# Patient Record
Sex: FEMALE | Race: WHITE | NOT HISPANIC OR LATINO | Employment: FULL TIME | URBAN - METROPOLITAN AREA
[De-identification: names, ages, dates, MRNs, and addresses within clinical notes are randomized per-mention and may not be internally consistent; named-entity substitution may affect disease eponyms.]

---

## 2019-06-29 ENCOUNTER — OFFICE VISIT (OUTPATIENT)
Dept: URGENT CARE | Facility: CLINIC | Age: 39
End: 2019-06-29
Payer: COMMERCIAL

## 2019-06-29 VITALS
WEIGHT: 129 LBS | SYSTOLIC BLOOD PRESSURE: 106 MMHG | TEMPERATURE: 98 F | OXYGEN SATURATION: 99 % | BODY MASS INDEX: 20.25 KG/M2 | RESPIRATION RATE: 17 BRPM | DIASTOLIC BLOOD PRESSURE: 66 MMHG | HEIGHT: 67 IN | HEART RATE: 100 BPM

## 2019-06-29 DIAGNOSIS — H10.31 ACUTE BACTERIAL CONJUNCTIVITIS OF RIGHT EYE: Primary | ICD-10-CM

## 2019-06-29 DIAGNOSIS — L03.211 CELLULITIS OF FACE: ICD-10-CM

## 2019-06-29 PROCEDURE — 99214 OFFICE O/P EST MOD 30 MIN: CPT | Mod: S$GLB,,, | Performed by: PHYSICIAN ASSISTANT

## 2019-06-29 PROCEDURE — 3008F BODY MASS INDEX DOCD: CPT | Mod: CPTII,S$GLB,, | Performed by: PHYSICIAN ASSISTANT

## 2019-06-29 PROCEDURE — 99214 PR OFFICE/OUTPT VISIT, EST, LEVL IV, 30-39 MIN: ICD-10-PCS | Mod: S$GLB,,, | Performed by: PHYSICIAN ASSISTANT

## 2019-06-29 PROCEDURE — 3008F PR BODY MASS INDEX (BMI) DOCUMENTED: ICD-10-PCS | Mod: CPTII,S$GLB,, | Performed by: PHYSICIAN ASSISTANT

## 2019-06-29 RX ORDER — OFLOXACIN 3 MG/ML
1 SOLUTION/ DROPS OPHTHALMIC 4 TIMES DAILY
Qty: 5 ML | Refills: 0 | Status: SHIPPED | OUTPATIENT
Start: 2019-06-29 | End: 2020-08-27 | Stop reason: CLARIF

## 2019-06-29 RX ORDER — CEPHALEXIN 500 MG/1
500 CAPSULE ORAL EVERY 8 HOURS
Qty: 21 CAPSULE | Refills: 0 | Status: SHIPPED | OUTPATIENT
Start: 2019-06-29 | End: 2019-07-06

## 2019-06-29 NOTE — PROGRESS NOTES
"Subjective:       Patient ID: Alton Bach is a 38 y.o. female.    Vitals:  height is 5' 7" (1.702 m) and weight is 58.5 kg (129 lb). Her temperature is 97.6 °F (36.4 °C). Her blood pressure is 106/66 and her pulse is 100. Her respiration is 17 and oxygen saturation is 99%.     Chief Complaint: Eye Problem    Patient complaining of eye irritation and swelling that began yesterday. States that she has been rubbing her eyes constantly and when she wakes up in the morning especially, it is hard to open and close her eye secondary to pain.    Eye Problem    The right eye is affected. This is a new problem. The current episode started yesterday. The problem occurs constantly. The problem has been gradually worsening. The injury mechanism is unknown. The patient is experiencing no pain. There is no known exposure to pink eye. She does not wear contacts. Associated symptoms include an eye discharge and itching. Pertinent negatives include no blurred vision, double vision, eye redness, fever, nausea, photophobia or vomiting. She has tried nothing for the symptoms. The treatment provided no relief.       Constitution: Negative for chills and fever.   HENT: Negative for congestion and sinus pain.    Eyes: Positive for eye discharge, eye itching, eye pain and eyelid swelling. Negative for eye trauma, foreign body in eye, eye redness, photophobia, vision loss, double vision and blurred vision.   Gastrointestinal: Negative for nausea and vomiting.   Genitourinary: Negative for history of kidney stones.   Skin: Negative for rash.   Allergic/Immunologic: Positive for itching. Negative for seasonal allergies.   Neurological: Negative for headaches.       Objective:      Physical Exam   Constitutional: She is oriented to person, place, and time. She appears well-developed and well-nourished.   HENT:   Head: Normocephalic and atraumatic.       Right Ear: External ear normal.   Left Ear: External ear normal.   Nose: Nose normal. "   Mouth/Throat: Oropharynx is clear and moist.   Eyes: Pupils are equal, round, and reactive to light. Conjunctivae, EOM and lids are normal. Right eye exhibits discharge.       Neck: Trachea normal, full passive range of motion without pain and phonation normal. Neck supple.   Musculoskeletal: Normal range of motion.   Neurological: She is alert and oriented to person, place, and time.   Skin: Skin is warm, dry and intact.   Psychiatric: She has a normal mood and affect. Her speech is normal and behavior is normal. Judgment and thought content normal. Cognition and memory are normal.   Nursing note and vitals reviewed.      Assessment:       1. Acute bacterial conjunctivitis of right eye    2. Cellulitis of face        Plan:         Acute bacterial conjunctivitis of right eye  -     ofloxacin (OCUFLOX) 0.3 % ophthalmic solution; Place 1 drop into the right eye 4 (four) times daily.  Dispense: 5 mL; Refill: 0    Cellulitis of face  -     cephALEXin (KEFLEX) 500 MG capsule; Take 1 capsule (500 mg total) by mouth every 8 (eight) hours. for 7 days  Dispense: 21 capsule; Refill: 0      Patient Instructions   General Discharge Instructions   If you were prescribed a narcotic or controlled medication, do not drive or operate heavy equipment or machinery while taking these medications.  If you were prescribed antibiotics, please take them to completion.  You must understand that you've received an Urgent Care treatment only and that you may be released before all your medical problems are known or treated. You, the patient, will arrange for follow up care as instructed.  Follow up with your PCP or specialty clinic as directed in the next 1-2 weeks if not improved or as needed.  You can call [phone] to schedule an appointment with the appropriate provider.  If your condition worsens we recommend that you receive another evaluation at the emergency room immediately or contact your primary medical clinics after hours  call service to discuss your concerns.  Please return here or go to the Emergency Department for any concerns or worsening of condition.      Conjunctivitis, Bacterial    You have an infection in the membranes covering the white part of the eye. This part of the eye is called the conjunctiva. The infection is called conjunctivitis. The most common symptoms of conjunctivitis include a thick, pus-like discharge from the eye, swollen eyelids, redness, eyelids sticking together upon awakening, and a gritty or scratchy feeling in the eye. Your infection was caused by bacteria. It may be treated with medicine. With treatment, the infection takes about 7 to 10 days to resolve.  Home care  · Use prescribed antibiotic eye drops or ointment as directed to treat the infection.  · Apply a warm compress (towel soaked in warm water) to the affected eye 3 to 4 times a day. Do this just before applying medicine to the eye.  · Use a warm, wet cloth to wipe away crusting of the eyelids in the morning. This is caused by mucus drainage during the night. You may also use saline irrigating solution or artificial tears to rinse away mucus in the eye. Do not put a patch over the eye.  · Wash your hands before and after touching the infected eye. This is to prevent spreading the infection to the other eye, and to other people. Do not share your towels or washcloths with others.  · You may use acetaminophen or ibuprofen to control pain, unless another medicine was prescribed. (Note: If you have chronic liver or kidney disease or have ever had a stomach ulcer or gastrointestinal bleeding, talk with your doctor before using these medicines.)  · Do not wear contact lenses until your eyes have healed and all symptoms are gone.  Follow-up care  Follow up with your healthcare provider, or as advised.  When to seek medical advice  Call your healthcare provider right away if any of these occur:  · Worsening vision  · Increasing pain in the  eye  · Increasing swelling or redness of the eyelid  · Redness spreading around the eye  Date Last Reviewed: 6/14/2015  © 4178-1942 [employer]. [address] [place] [address], [place] [address]. All rights reserved. This information is not intended as a substitute for professional medical care. Always follow your healthcare professional's instructions.        Facial Cellulitis  Cellulitis is an infection of the deep layers of skin. A break in the skin, such as a cut or scratch, can let bacteria under the skin. It may also occur from an infected oil gland (pimple) or hair follicle. If the bacteria get to deep layers of the skin, it can be serious. If not treated, cellulitis can get into the bloodstream and lymph nodes. The infection can then spread throughout the body. This causes serious illness.  Cellulitis causes the affected skin to become red, swollen, warm, and sore. The reddened areas have a visible border. You may have a fever, chills, and pain.  Cellulitis is treated with antibiotics taken for 7 to 10 days. Symptoms should get better 1 to 2 days after treatment is started. Make sure to take all the antibiotics for the full number of days until they are gone. Keep taking the medication even if your symptoms go away.  Home care  Follow these tips:  · Take all of the antibiotic medicine exactly as directed until it is gone. Dont miss any doses, especially during the first 7 days. Dont stop taking it when your symptoms get better.  · Use a cool compress (face cloth soaked in cool water) on your face to help reduce swelling and pain.  · You may use acetaminophen or ibuprofen to reduce pain. Dont use these if you have chronic liver or kidney disease, or ever had a stomach ulcer or gastrointestinal bleeding. Talk with your healthcare provider first.  Follow-up care  Follow up with your healthcare provider, or as advised. If your infection does not go away on the first antibiotic, your healthcare provider  will prescribe a different one.  When to seek medical advice  Call your healthcare provider right away if any of these occur:  · Fever higher of 100.4º F (38.0º C) or higher after 2 days on antibiotics  · Red areas that spread  · Swelling or pain that gets worse  · Fluid leaking from the skin (pus)  · An eyelid that swells shut or leaks fluid (pus)  · Headache or neck pain that gets worse  · Unusual drowsiness or confusion  · Convulsions (seizure)  · Change in eyesight     Date Last Reviewed: 9/1/2016  © 5304-2018 [employer]. [address] [place] [address]. All rights reserved. This information is not intended as a substitute for professional medical care. Always follow your healthcare professional's instructions.

## 2019-06-29 NOTE — PATIENT INSTRUCTIONS
General Discharge Instructions   If you were prescribed a narcotic or controlled medication, do not drive or operate heavy equipment or machinery while taking these medications.  If you were prescribed antibiotics, please take them to completion.  You must understand that you've received an Urgent Care treatment only and that you may be released before all your medical problems are known or treated. You, the patient, will arrange for follow up care as instructed.  Follow up with your PCP or specialty clinic as directed in the next 1-2 weeks if not improved or as needed.  You can call [phone] to schedule an appointment with the appropriate provider.  If your condition worsens we recommend that you receive another evaluation at the emergency room immediately or contact your primary medical clinics after hours call service to discuss your concerns.  Please return here or go to the Emergency Department for any concerns or worsening of condition.      Conjunctivitis, Bacterial    You have an infection in the membranes covering the white part of the eye. This part of the eye is called the conjunctiva. The infection is called conjunctivitis. The most common symptoms of conjunctivitis include a thick, pus-like discharge from the eye, swollen eyelids, redness, eyelids sticking together upon awakening, and a gritty or scratchy feeling in the eye. Your infection was caused by bacteria. It may be treated with medicine. With treatment, the infection takes about 7 to 10 days to resolve.  Home care  · Use prescribed antibiotic eye drops or ointment as directed to treat the infection.  · Apply a warm compress (towel soaked in warm water) to the affected eye 3 to 4 times a day. Do this just before applying medicine to the eye.  · Use a warm, wet cloth to wipe away crusting of the eyelids in the morning. This is caused by mucus drainage during the night. You may also use saline irrigating solution or artificial tears to rinse  away mucus in the eye. Do not put a patch over the eye.  · Wash your hands before and after touching the infected eye. This is to prevent spreading the infection to the other eye, and to other people. Do not share your towels or washcloths with others.  · You may use acetaminophen or ibuprofen to control pain, unless another medicine was prescribed. (Note: If you have chronic liver or kidney disease or have ever had a stomach ulcer or gastrointestinal bleeding, talk with your doctor before using these medicines.)  · Do not wear contact lenses until your eyes have healed and all symptoms are gone.  Follow-up care  Follow up with your healthcare provider, or as advised.  When to seek medical advice  Call your healthcare provider right away if any of these occur:  · Worsening vision  · Increasing pain in the eye  · Increasing swelling or redness of the eyelid  · Redness spreading around the eye  Date Last Reviewed: 6/14/2015  © 5790-7555 [employer]. [address] [place] [address]. All rights reserved. This information is not intended as a substitute for professional medical care. Always follow your healthcare professional's instructions.        Facial Cellulitis  Cellulitis is an infection of the deep layers of skin. A break in the skin, such as a cut or scratch, can let bacteria under the skin. It may also occur from an infected oil gland (pimple) or hair follicle. If the bacteria get to deep layers of the skin, it can be serious. If not treated, cellulitis can get into the bloodstream and lymph nodes. The infection can then spread throughout the body. This causes serious illness.  Cellulitis causes the affected skin to become red, swollen, warm, and sore. The reddened areas have a visible border. You may have a fever, chills, and pain.  Cellulitis is treated with antibiotics taken for 7 to 10 days. Symptoms should get better 1 to 2 days after treatment is started. Make sure to take all the  antibiotics for the full number of days until they are gone. Keep taking the medication even if your symptoms go away.  Home care  Follow these tips:  · Take all of the antibiotic medicine exactly as directed until it is gone. Dont miss any doses, especially during the first 7 days. Dont stop taking it when your symptoms get better.  · Use a cool compress (face cloth soaked in cool water) on your face to help reduce swelling and pain.  · You may use acetaminophen or ibuprofen to reduce pain. Dont use these if you have chronic liver or kidney disease, or ever had a stomach ulcer or gastrointestinal bleeding. Talk with your healthcare provider first.  Follow-up care  Follow up with your healthcare provider, or as advised. If your infection does not go away on the first antibiotic, your healthcare provider will prescribe a different one.  When to seek medical advice  Call your healthcare provider right away if any of these occur:  · Fever higher of 100.4º F (38.0º C) or higher after 2 days on antibiotics  · Red areas that spread  · Swelling or pain that gets worse  · Fluid leaking from the skin (pus)  · An eyelid that swells shut or leaks fluid (pus)  · Headache or neck pain that gets worse  · Unusual drowsiness or confusion  · Convulsions (seizure)  · Change in eyesight     Date Last Reviewed: 9/1/2016  © 7422-7590 [employer]. [address] [place] [address], [place] [address]. All rights reserved. This information is not intended as a substitute for professional medical care. Always follow your healthcare professional's instructions.

## 2020-05-14 ENCOUNTER — HOSPITAL ENCOUNTER (EMERGENCY)
Facility: HOSPITAL | Age: 40
Discharge: HOME | End: 2020-05-14
Attending: INTERNAL MEDICINE
Payer: COMMERCIAL

## 2020-05-14 VITALS
HEIGHT: 67 IN | TEMPERATURE: 98 F | DIASTOLIC BLOOD PRESSURE: 69 MMHG | WEIGHT: 135 LBS | SYSTOLIC BLOOD PRESSURE: 142 MMHG | HEART RATE: 74 BPM | RESPIRATION RATE: 18 BRPM | OXYGEN SATURATION: 98 % | BODY MASS INDEX: 21.19 KG/M2

## 2020-05-14 DIAGNOSIS — M79.604 LOWER EXTREMITY PAIN, RIGHT: ICD-10-CM

## 2020-05-14 DIAGNOSIS — S76.211A GROIN STRAIN, RIGHT, INITIAL ENCOUNTER: Primary | ICD-10-CM

## 2020-05-14 PROBLEM — S76.219A GROIN STRAIN: Status: ACTIVE | Noted: 2020-05-14

## 2020-05-14 LAB
B-HCG UR QL: NEGATIVE
CTP QC/QA: YES

## 2020-05-14 PROCEDURE — 99284 EMERGENCY DEPT VISIT MOD MDM: CPT | Mod: 25,ER

## 2020-05-14 PROCEDURE — 81025 URINE PREGNANCY TEST: CPT | Mod: ER | Performed by: INTERNAL MEDICINE

## 2020-05-14 RX ORDER — IBUPROFEN 600 MG/1
600 TABLET ORAL 3 TIMES DAILY
Qty: 30 TABLET | Refills: 0 | Status: SHIPPED | OUTPATIENT
Start: 2020-05-14 | End: 2023-08-04

## 2020-05-15 NOTE — ED PROVIDER NOTES
Encounter Date: 5/14/2020    SCRIBE #1 NOTE: I, Vannessa Lozano, am scribing for, and in the presence of,  Dr. Ambrose. I have scribed the following portions of the note - Other sections scribed: HPI, ROS, PE.       History     Chief Complaint   Patient presents with    Leg Pain     PT C/O UNCOMFORTABLE FEELING TO RIGHT CALF AND KNEE FOR 2 -3 WEEKS WITH FEELING NOW RADIATING TO RIGHT GROIN     Alton Bach is a 39 y.o. female who presents to the ED complaining of pain in her right calf that radiates to the right side of her groin for the last 2 weeks. Pt denies fever, sore throat, shortness of breath, dysuria, behavioral problems, weakness, rash, chest pain, headaches, diarrhea, back pain, nausea and vomiting.     The history is provided by the patient. No  was used.     Review of patient's allergies indicates:   Allergen Reactions    Coconut     [place]      History reviewed. No pertinent past medical history.  Past Surgical History:   Procedure Laterality Date    breast augmentation      CHOLECYSTECTOMY       Family History   Problem Relation Age of Onset    Hypertension Mother     Heart disease Father      Social History     Tobacco Use    Smoking status: Current Every Day Smoker     Packs/day: 0.50    Smokeless tobacco: Never Used   Substance Use Topics    Alcohol use: No    Drug use: No     Review of Systems   Constitutional: Negative for fever.   HENT: Negative for sore throat.    Respiratory: Negative for shortness of breath.    Cardiovascular: Negative for chest pain.   Gastrointestinal: Negative for nausea and vomiting.   Genitourinary: Negative for dysuria.   Musculoskeletal: Positive for arthralgias and myalgias. Negative for back pain.   Skin: Negative for rash.   Neurological: Negative for weakness.   Hematological: Negative for adenopathy.   Psychiatric/Behavioral: Negative for behavioral problems.   All other systems reviewed and are negative.      Physical  Exam     Initial Vitals [05/14/20 2102]   BP Pulse Resp Temp SpO2   (!) 157/103 98 18 98.5 °F (36.9 °C) 98 %      MAP       --         Physical Exam    Nursing note and vitals reviewed.  Constitutional: She appears well-developed and well-nourished.   HENT:   Head: Normocephalic and atraumatic.   Eyes: Conjunctivae are normal.   Neck: Normal range of motion. Neck supple.   Cardiovascular: Normal rate, regular rhythm and normal heart sounds. Exam reveals no gallop and no friction rub.    No murmur heard.  Pulmonary/Chest: Breath sounds normal. No respiratory distress. She has no wheezes. She has no rhonchi. She has no rales.   Abdominal: Soft. There is no tenderness.   Musculoskeletal: Normal range of motion. She exhibits no edema or tenderness.   Right groin/upper thigh pain upon internal and external rotation right foot without tenderness to palpation gross   Neurological: She is alert and oriented to person, place, and time. She has normal strength and normal reflexes. GCS score is 15. GCS eye subscore is 4. GCS verbal subscore is 5. GCS motor subscore is 6.   Skin: Skin is warm and dry. Capillary refill takes less than 2 seconds.   Psychiatric: She has a normal mood and affect. Thought content normal.         ED Course   Procedures  Labs Reviewed   POCT URINE PREGNANCY          Imaging Results          US Lower Extremity Veins Right (Final result)  Result time 05/14/20 22:27:09    Final result by Flor Dahl MD (05/14/20 22:27:09)                 Impression:      No evidence of right deep venous thrombosis.      Electronically signed by: Flor Dahl  Date:    05/14/2020  Time:    22:27             Narrative:    EXAMINATION:  ULTRASOUND LOWER EXTREMITY VEINS RIGHT    CLINICAL HISTORY:  Pain in right leg    TECHNIQUE:  Grayscale imaging of the right lower extremity to evaluate the deep and superficial venous system with color Doppler interrogation and Spectral Doppler wave form analysis was  performed.    COMPARISON:  None.    FINDINGS:  There is normal color Doppler interrogation, compression and distal augmentation of the right common femoral, femoral, greater saphenous, popliteal, posterior tibial, anterior tibial, and peroneal veins.                                 Medical Decision Making:   History:   Old Medical Records: I decided to obtain old medical records.  Initial Assessment:    39 y.o. female who presents to the ED complaining of pain in her right calf that radiates to the right side of her groin for the last 2 weeks.  Clinical Tests:   Lab Tests: Ordered and Reviewed  Radiological Study: Ordered and Reviewed  ED Management:  Ultrasound right lower extremity no evidence of DVT.  Patient received a prescription for ibuprofen.  Instructions for groin strain were given.  She was advised to follow up with her primary care physician within the next 3 days for re-evaluation/return to the emergency department if condition worsens.            Scribe Attestation:   Scribe #1: I performed the above scribed service and the documentation accurately describes the services I performed. I attest to the accuracy of the note.    This document was produced by a scribe under my direction and in my presence. I agree with the content of the note and have made any necessary edits.     Dr. Ambrose    05/15/2020 4:00 AM                        Clinical Impression:     1. Groin strain, right, initial encounter    2. Lower extremity pain, right            Disposition:   Disposition: Discharged  Condition: Stable                        Fazal Ambrose MD  05/15/20 0401       Fazal Ambrose MD  05/15/20 0402

## 2020-07-23 ENCOUNTER — OFFICE VISIT (OUTPATIENT)
Dept: SURGERY | Facility: CLINIC | Age: 40
End: 2020-07-23
Payer: COMMERCIAL

## 2020-07-23 VITALS
HEIGHT: 67 IN | BODY MASS INDEX: 21.18 KG/M2 | DIASTOLIC BLOOD PRESSURE: 80 MMHG | SYSTOLIC BLOOD PRESSURE: 121 MMHG | WEIGHT: 134.94 LBS | HEART RATE: 92 BPM

## 2020-07-23 DIAGNOSIS — Z12.31 SCREENING MAMMOGRAM, ENCOUNTER FOR: Primary | ICD-10-CM

## 2020-07-23 DIAGNOSIS — N63.0 BREAST MASS: ICD-10-CM

## 2020-07-23 PROCEDURE — 99203 PR OFFICE/OUTPT VISIT, NEW, LEVL III, 30-44 MIN: ICD-10-PCS | Mod: S$GLB,,, | Performed by: SURGERY

## 2020-07-23 PROCEDURE — 99999 PR PBB SHADOW E&M-EST. PATIENT-LVL III: ICD-10-PCS | Mod: PBBFAC,,, | Performed by: SURGERY

## 2020-07-23 PROCEDURE — 3008F PR BODY MASS INDEX (BMI) DOCUMENTED: ICD-10-PCS | Mod: CPTII,S$GLB,, | Performed by: SURGERY

## 2020-07-23 PROCEDURE — 99999 PR PBB SHADOW E&M-EST. PATIENT-LVL III: CPT | Mod: PBBFAC,,, | Performed by: SURGERY

## 2020-07-23 PROCEDURE — 99203 OFFICE O/P NEW LOW 30 MIN: CPT | Mod: S$GLB,,, | Performed by: SURGERY

## 2020-07-23 PROCEDURE — 3008F BODY MASS INDEX DOCD: CPT | Mod: CPTII,S$GLB,, | Performed by: SURGERY

## 2020-07-23 RX ORDER — MULTIVITAMIN
1 TABLET ORAL DAILY
COMMUNITY
End: 2022-06-27 | Stop reason: CLARIF

## 2020-07-23 NOTE — PROGRESS NOTES
History and Physical  [facility]  Department of Surgery    CHIEF COMPLAINT: bilateral breast mass    REFERRING:  Aaareferral Self  No address on file  Primary Doctor No      Subjective:      Alton Bach is a 39 y.o. premenopausal female referred for evaluation of a breast mass. Change was noted a while ago.  Patient does routinely do self breast exams.  Patient has noted a change on breast exam.  Patient denies nipple discharge. Patient reports to previous breast biopsy. Patient denies a personal history of breast cancer.    Reports masses in both breasts, however she thinks she felt a new one in the upper right breast. History of biopsy that was negative in the past.  Last mammogram was 5 years ago.  Does have hisotry of implants.    GYN History:  Age of menarche was 12. Age of menopause was n/a. Patient denies hormonal therapy. Patient is . Age of first live birth was 18.  Patient did not breast feed.    FAMILY history:  none    History reviewed. No pertinent past medical history.  Past Surgical History:   Procedure Laterality Date    breast augmentation      BREAST BIOPSY Right     CHOLECYSTECTOMY       Current Outpatient Medications on File Prior to Visit   Medication Sig Dispense Refill    fluticasone (FLONASE) 50 mcg/actuation nasal spray 1-2 sprays by Each Nare route once daily. 1 Bottle 0    ibuprofen (ADVIL,MOTRIN) 600 MG tablet Take 1 tablet (600 mg total) by mouth 3 (three) times daily. 30 tablet 0    loratadine (CLARITIN) 10 mg tablet Take 1 tablet (10 mg total) by mouth once daily. 30 tablet 0    multivitamin (ONE DAILY MULTIVITAMIN) per tablet Take 1 tablet by mouth once daily.      ofloxacin (OCUFLOX) 0.3 % ophthalmic solution Place 1 drop into the right eye 4 (four) times daily. 5 mL 0     No current facility-administered medications on file prior to visit.      Social History     Socioeconomic History    Marital status: Single     Spouse name: Not on file    Number  "of children: Not on file    Years of education: Not on file    Highest education level: Not on file   Occupational History    Not on file   Social Needs    Financial resource strain: Not on file    Food insecurity     Worry: Not on file     Inability: Not on file    Transportation needs     Medical: Not on file     Non-medical: Not on file   Tobacco Use    Smoking status: Current Every Day Smoker     Packs/day: 0.50    Smokeless tobacco: Never Used   Substance and Sexual Activity    Alcohol use: No    Drug use: No    Sexual activity: Not on file   Lifestyle    Physical activity     Days per week: Not on file     Minutes per session: Not on file    Stress: Not on file   Relationships    Social connections     Talks on phone: Not on file     Gets together: Not on file     Attends Sikh service: Not on file     Active member of club or organization: Not on file     Attends meetings of clubs or organizations: Not on file     Relationship status: Not on file   Other Topics Concern    Not on file   Social History Narrative    Not on file     Family History   Problem Relation Age of Onset    Hypertension Mother     Heart disease Father        Review of Systems  Pertinent items are noted in HPI.       Objective:        /80 (BP Location: Left arm, Patient Position: Sitting, BP Method: Medium (Automatic))   Pulse 92   Ht 5' 7" (1.702 m)   Wt 61.2 kg (134 lb 14.7 oz)   LMP 07/13/2020 (Exact Date)   BMI 21.13 kg/m²   General appearance: alert, appears stated age and cooperative  Head: Normocephalic, without obvious abnormality, atraumatic  Neck: no adenopathy and supple, symmetrical, trachea midline  Lungs: normal effort, nonlabored breathing  Breasts: No nipple retraction or dimpling, No nipple discharge or bleeding, No axillary or supraclavicular adenopathy, positive findings: thickenin the right UOQ.   Extremities: extremities normal, atraumatic, no cyanosis or edema  Skin: normal, no edema " and no lesions noted  Lymph nodes: Cervical, supraclavicular, and axillary nodes normal.  Neurologic: Grossly normal    Radiology review: due      Assessment:      Alton Bach is a 39 y.o. premenopausal female with bilateral breast masses, overdue for imaging.     Plan:   We discussed the options for management of this.    We discussed further evaluation with imaging including an ultrasound and mammogram.  Pending results will determine next step.  May need image guided biopsy.

## 2020-07-27 ENCOUNTER — HOSPITAL ENCOUNTER (OUTPATIENT)
Dept: RADIOLOGY | Facility: HOSPITAL | Age: 40
Discharge: HOME | End: 2020-07-27
Attending: SURGERY
Payer: COMMERCIAL

## 2020-07-27 DIAGNOSIS — Z12.31 SCREENING MAMMOGRAM, ENCOUNTER FOR: ICD-10-CM

## 2020-07-27 PROCEDURE — 77063 MAMMO DIGITAL SCREENING BILAT WITH TOMOSYNTHESIS_CAD: ICD-10-PCS | Mod: 26,,, | Performed by: RADIOLOGY

## 2020-07-27 PROCEDURE — 77067 SCR MAMMO BI INCL CAD: CPT | Mod: TC

## 2020-07-27 PROCEDURE — 77067 MAMMO DIGITAL SCREENING BILAT WITH TOMOSYNTHESIS_CAD: ICD-10-PCS | Mod: 26,,, | Performed by: RADIOLOGY

## 2020-07-27 PROCEDURE — 77067 SCR MAMMO BI INCL CAD: CPT | Mod: 26,,, | Performed by: RADIOLOGY

## 2020-07-27 PROCEDURE — 77063 BREAST TOMOSYNTHESIS BI: CPT | Mod: 26,,, | Performed by: RADIOLOGY

## 2020-07-28 ENCOUNTER — TELEPHONE (OUTPATIENT)
Dept: RADIOLOGY | Facility: HOSPITAL | Age: 40
End: 2020-07-28

## 2020-07-28 NOTE — TELEPHONE ENCOUNTER
Spoke with patient and explained mammogram findings.Patient expressed understanding of results. Patient scheduled abnormal mammogram follow up appointment at [facility] [place] on 8/6/2020.

## 2020-08-06 ENCOUNTER — TELEPHONE (OUTPATIENT)
Dept: RADIOLOGY | Facility: HOSPITAL | Age: 40
End: 2020-08-06

## 2020-08-06 ENCOUNTER — HOSPITAL ENCOUNTER (OUTPATIENT)
Dept: RADIOLOGY | Facility: HOSPITAL | Age: 40
Discharge: HOME | End: 2020-08-06
Attending: SURGERY
Payer: COMMERCIAL

## 2020-08-06 DIAGNOSIS — R92.8 ABNORMAL MAMMOGRAM: ICD-10-CM

## 2020-08-06 PROCEDURE — 76642 ULTRASOUND BREAST LIMITED: CPT | Mod: 26,LT,, | Performed by: RADIOLOGY

## 2020-08-06 PROCEDURE — 77066 MAMMO DIGITAL DIAGNOSTIC BILAT WITH TOMOSYNTHESIS_CAD: ICD-10-PCS | Mod: 26,,, | Performed by: RADIOLOGY

## 2020-08-06 PROCEDURE — 77062 BREAST TOMOSYNTHESIS BI: CPT | Mod: 26,,, | Performed by: RADIOLOGY

## 2020-08-06 PROCEDURE — 76642 ULTRASOUND BREAST LIMITED: CPT | Mod: TC,50,PO

## 2020-08-06 PROCEDURE — 77066 DX MAMMO INCL CAD BI: CPT | Mod: TC,PO

## 2020-08-06 PROCEDURE — 76642 ULTRASOUND BREAST LIMITED: CPT | Mod: 26,RT,, | Performed by: RADIOLOGY

## 2020-08-06 PROCEDURE — 76642 US BREAST BILATERAL LIMITED: ICD-10-PCS | Mod: 26,RT,, | Performed by: RADIOLOGY

## 2020-08-06 PROCEDURE — 77066 DX MAMMO INCL CAD BI: CPT | Mod: 26,,, | Performed by: RADIOLOGY

## 2020-08-06 PROCEDURE — 77062 MAMMO DIGITAL DIAGNOSTIC BILAT WITH TOMOSYNTHESIS_CAD: ICD-10-PCS | Mod: 26,,, | Performed by: RADIOLOGY

## 2020-08-06 NOTE — TELEPHONE ENCOUNTER
Spoke with patient. Reviewed breast biopsy procedure and reviewed instructions for breast biopsy. Patient expressed understanding and all questions were answered. Provided patient with my phone number to call for any further concerns or questions.   Patient scheduled breast biopsy at the [facility] on 8/21/2020.

## 2020-08-20 ENCOUNTER — RESEARCH ENCOUNTER (OUTPATIENT)
Dept: RESEARCH | Facility: HOSPITAL | Age: 40
End: 2020-08-20

## 2020-08-21 ENCOUNTER — HOSPITAL ENCOUNTER (OUTPATIENT)
Dept: RADIOLOGY | Facility: HOSPITAL | Age: 40
Discharge: HOME | End: 2020-08-21
Attending: SURGERY
Payer: COMMERCIAL

## 2020-08-21 DIAGNOSIS — N63.0 BREAST MASS: Primary | ICD-10-CM

## 2020-08-21 DIAGNOSIS — R92.8 ABNORMAL MAMMOGRAM: ICD-10-CM

## 2020-08-21 PROCEDURE — 77066 MAMMO DIGITAL DIAGNOSTIC BILAT WITH CAD: ICD-10-PCS | Mod: 26,,, | Performed by: RADIOLOGY

## 2020-08-21 PROCEDURE — 25000003 PHARM REV CODE 250: Performed by: SURGERY

## 2020-08-21 PROCEDURE — 27201068 MAMMO DIGITAL DIAGNOSTIC BILAT WITH CAD

## 2020-08-21 PROCEDURE — 27201068 US BREAST BIOPSY WITH IMAGING 1ST SITE RIGHT

## 2020-08-21 PROCEDURE — 88341 IMHCHEM/IMCYTCHM EA ADD ANTB: CPT | Mod: 59 | Performed by: PATHOLOGY

## 2020-08-21 PROCEDURE — 88360 TUMOR IMMUNOHISTOCHEM/MANUAL: CPT | Performed by: PATHOLOGY

## 2020-08-21 PROCEDURE — 88342 IMHCHEM/IMCYTCHM 1ST ANTB: CPT | Mod: 26,,, | Performed by: PATHOLOGY

## 2020-08-21 PROCEDURE — 19083 BX BREAST 1ST LESION US IMAG: CPT | Mod: TC,LT

## 2020-08-21 PROCEDURE — 19083 BX BREAST 1ST LESION US IMAG: CPT | Mod: LT,,, | Performed by: RADIOLOGY

## 2020-08-21 PROCEDURE — 88305 TISSUE EXAM BY PATHOLOGIST: CPT | Mod: 26,,, | Performed by: PATHOLOGY

## 2020-08-21 PROCEDURE — 77066 DX MAMMO INCL CAD BI: CPT | Mod: 26,,, | Performed by: RADIOLOGY

## 2020-08-21 PROCEDURE — 77066 DX MAMMO INCL CAD BI: CPT | Mod: TC

## 2020-08-21 PROCEDURE — 19083 US BREAST BIOPSY WITH IMAGING 1ST SITE LEFT: ICD-10-PCS | Mod: LT,,, | Performed by: RADIOLOGY

## 2020-08-21 PROCEDURE — 88305 TISSUE EXAM BY PATHOLOGIST: CPT | Performed by: PATHOLOGY

## 2020-08-21 PROCEDURE — 88341 IMHCHEM/IMCYTCHM EA ADD ANTB: CPT | Mod: 26,,, | Performed by: PATHOLOGY

## 2020-08-21 PROCEDURE — 88341 PR IHC OR ICC EACH ADD'L SINGLE ANTIBODY  STAINPR: ICD-10-PCS | Mod: 26,,, | Performed by: PATHOLOGY

## 2020-08-21 PROCEDURE — 19084 BX BREAST ADD LESION US IMAG: CPT | Mod: RT,,, | Performed by: RADIOLOGY

## 2020-08-21 PROCEDURE — 88342 IMHCHEM/IMCYTCHM 1ST ANTB: CPT | Performed by: PATHOLOGY

## 2020-08-21 PROCEDURE — 19084 US BREAST BIOPSY WITH IMAGING EA ADDITIONAL: ICD-10-PCS | Mod: RT,,, | Performed by: RADIOLOGY

## 2020-08-21 PROCEDURE — 88342 CHG IMMUNOCYTOCHEMISTRY: ICD-10-PCS | Mod: 26,,, | Performed by: PATHOLOGY

## 2020-08-21 PROCEDURE — 19083 BX BREAST 1ST LESION US IMAG: CPT | Mod: RT,,, | Performed by: RADIOLOGY

## 2020-08-21 PROCEDURE — 19083 US BREAST BIOPSY WITH IMAGING 1ST SITE RIGHT: ICD-10-PCS | Mod: RT,,, | Performed by: RADIOLOGY

## 2020-08-21 PROCEDURE — 27201068 US BREAST BIOPSY WITH IMAGING EA ADDITIONAL

## 2020-08-21 PROCEDURE — 88305 TISSUE EXAM BY PATHOLOGIST: ICD-10-PCS | Mod: 26,,, | Performed by: PATHOLOGY

## 2020-08-21 RX ORDER — LIDOCAINE HYDROCHLORIDE 10 MG/ML
6 INJECTION INFILTRATION; PERINEURAL ONCE
Status: COMPLETED | OUTPATIENT
Start: 2020-08-21 | End: 2020-08-21

## 2020-08-21 RX ORDER — LIDOCAINE HYDROCHLORIDE AND EPINEPHRINE 20; 10 MG/ML; UG/ML
10 INJECTION, SOLUTION INFILTRATION; PERINEURAL ONCE
Status: COMPLETED | OUTPATIENT
Start: 2020-08-21 | End: 2020-08-21

## 2020-08-21 RX ORDER — LIDOCAINE HYDROCHLORIDE AND EPINEPHRINE 20; 10 MG/ML; UG/ML
20 INJECTION, SOLUTION INFILTRATION; PERINEURAL ONCE
Status: COMPLETED | OUTPATIENT
Start: 2020-08-21 | End: 2020-08-21

## 2020-08-21 RX ORDER — LIDOCAINE HYDROCHLORIDE 10 MG/ML
3 INJECTION INFILTRATION; PERINEURAL ONCE
Status: COMPLETED | OUTPATIENT
Start: 2020-08-21 | End: 2020-08-21

## 2020-08-21 RX ADMIN — LIDOCAINE HYDROCHLORIDE,EPINEPHRINE BITARTRATE 10 ML: 20; .01 INJECTION, SOLUTION INFILTRATION; PERINEURAL at 10:08

## 2020-08-21 RX ADMIN — LIDOCAINE HYDROCHLORIDE 6 ML: 10 INJECTION, SOLUTION INFILTRATION; PERINEURAL at 06:08

## 2020-08-21 RX ADMIN — LIDOCAINE HYDROCHLORIDE 3 ML: 10 INJECTION, SOLUTION EPIDURAL; INFILTRATION; INTRACAUDAL; PERINEURAL at 08:08

## 2020-08-21 RX ADMIN — LIDOCAINE HYDROCHLORIDE,EPINEPHRINE BITARTRATE 20 ML: 20; .01 INJECTION, SOLUTION INFILTRATION; PERINEURAL at 09:08

## 2020-08-21 NOTE — PROGRESS NOTES
Pt was contacted by phone regarding participation in IRB protocol #2018.314, [facility] Breast Biopsy study. Pt was agreeable.      The Informed Consent Form (ICF) was reviewed with pt. The discussion included:   - participation is voluntary  - pt can change her mind about participating  - pt was informed that participation in this study would not preclude her from participating in any other research if offered  - specimens may be used by [facility] researchers or community researchers  - specimens collected include only those discussed with the patient at the time of consent and are indicated on the ICF   - specimens may be used for DNA, RNA or protein studies investigating biomarkers for diagnostic, prognostic or treatment purposes  - breast biopsy will be collected from [facility] after their approval  - all specimens released to researchers will be stripped of identifiers  - no personal medical information will be released to any parties outside of this research study  - there will be no other physical risks outside of those involved in standard of care procedure      Permission was obtained by telephone and was provided adequate time to ask questions regarding the scope and purpose of the study.  The above statements were read by the person obtaining permission to the person granting permission and witnessed by Zhanna Mathew, who also confirmed the patient consent to the study. The witness information was documented on the verbal consent form as well.  This Verbal Informed Consent process was conducted prior to initiation of any study procedures

## 2020-08-25 ENCOUNTER — TELEPHONE (OUTPATIENT)
Dept: SURGERY | Facility: CLINIC | Age: 40
End: 2020-08-25

## 2020-08-25 NOTE — NURSING
Oncology Navigation   Intake  Date of Diagnosis: 08/21/20  Cancer Type: Breast  MD Assigned: Dr. Anahi Huitron  Internal / External Referral: Internal  Initial Nurse Navigator Contact: 08/25/20  Diagnosis to Initial Contact Timeline (days): 4 days  Contact Method: Phone  Date Worked: 08/25/20  First Appointment Available: 08/27/20  Appointment Date: 08/27/20  Schedule to Appointment Timeline (days): 2  First Available Date vs. Scheduled Date (days): 0     Treatment                  Acuity      Follow Up  Follow up in about 3 days (around 8/28/2020) for after initial visit.

## 2020-08-25 NOTE — TELEPHONE ENCOUNTER
Called Patient with results of breast biopsy from 8/21/2020. Explained that the biopsy showed DCIS. Discussed what this means and that the next step is to meet with her breast surgeon. An appt was made for 8/27/2020 with Dr. Huitron. Reviewed location of [facility]. Patient verbalized understanding.

## 2020-08-27 ENCOUNTER — LAB VISIT (OUTPATIENT)
Dept: LAB | Facility: OTHER | Age: 40
End: 2020-08-27
Attending: SURGERY
Payer: COMMERCIAL

## 2020-08-27 ENCOUNTER — OFFICE VISIT (OUTPATIENT)
Dept: SURGERY | Facility: CLINIC | Age: 40
End: 2020-08-27
Attending: SURGERY
Payer: COMMERCIAL

## 2020-08-27 VITALS
DIASTOLIC BLOOD PRESSURE: 84 MMHG | WEIGHT: 134.94 LBS | SYSTOLIC BLOOD PRESSURE: 121 MMHG | BODY MASS INDEX: 21.18 KG/M2 | HEIGHT: 67 IN | HEART RATE: 90 BPM

## 2020-08-27 DIAGNOSIS — Z01.818 PREOP TESTING: ICD-10-CM

## 2020-08-27 DIAGNOSIS — D05.12 DUCTAL CARCINOMA IN SITU (DCIS) OF LEFT BREAST: ICD-10-CM

## 2020-08-27 DIAGNOSIS — D05.12 DUCTAL CARCINOMA IN SITU (DCIS) OF LEFT BREAST: Primary | ICD-10-CM

## 2020-08-27 LAB
ALBUMIN SERPL BCP-MCNC: 4 G/DL (ref 3.5–5.2)
ALP SERPL-CCNC: 55 U/L (ref 55–135)
ALT SERPL W/O P-5'-P-CCNC: 14 U/L (ref 10–44)
ANION GAP SERPL CALC-SCNC: 12 MMOL/L (ref 8–16)
AST SERPL-CCNC: 15 U/L (ref 10–40)
BASOPHILS # BLD AUTO: 0.04 K/UL (ref 0–0.2)
BASOPHILS NFR BLD: 0.5 % (ref 0–1.9)
BILIRUB SERPL-MCNC: 0.4 MG/DL (ref 0.1–1)
BUN SERPL-MCNC: 9 MG/DL (ref 6–20)
CALCIUM SERPL-MCNC: 9.3 MG/DL (ref 8.7–10.5)
CHLORIDE SERPL-SCNC: 104 MMOL/L (ref 95–110)
CO2 SERPL-SCNC: 23 MMOL/L (ref 23–29)
CREAT SERPL-MCNC: 0.7 MG/DL (ref 0.5–1.4)
DIFFERENTIAL METHOD: NORMAL
EOSINOPHIL # BLD AUTO: 0.1 K/UL (ref 0–0.5)
EOSINOPHIL NFR BLD: 0.8 % (ref 0–8)
ERYTHROCYTE [DISTWIDTH] IN BLOOD BY AUTOMATED COUNT: 12.1 % (ref 11.5–14.5)
EST. GFR  (AFRICAN AMERICAN): >60 ML/MIN/1.73 M^2
EST. GFR  (NON AFRICAN AMERICAN): >60 ML/MIN/1.73 M^2
FINAL PATHOLOGIC DIAGNOSIS: NORMAL
GLUCOSE SERPL-MCNC: 83 MG/DL (ref 70–110)
GROSS: NORMAL
HCT VFR BLD AUTO: 43.5 % (ref 37–48.5)
HGB BLD-MCNC: 14.2 G/DL (ref 12–16)
IMM GRANULOCYTES # BLD AUTO: 0.03 K/UL (ref 0–0.04)
IMM GRANULOCYTES NFR BLD AUTO: 0.4 % (ref 0–0.5)
LYMPHOCYTES # BLD AUTO: 2.2 K/UL (ref 1–4.8)
LYMPHOCYTES NFR BLD: 25.7 % (ref 18–48)
Lab: NORMAL
MCH RBC QN AUTO: 30.9 PG (ref 27–31)
MCHC RBC AUTO-ENTMCNC: 32.6 G/DL (ref 32–36)
MCV RBC AUTO: 95 FL (ref 82–98)
MONOCYTES # BLD AUTO: 0.6 K/UL (ref 0.3–1)
MONOCYTES NFR BLD: 6.7 % (ref 4–15)
NEUTROPHILS # BLD AUTO: 5.6 K/UL (ref 1.8–7.7)
NEUTROPHILS NFR BLD: 65.9 % (ref 38–73)
NRBC BLD-RTO: 0 /100 WBC
PLATELET # BLD AUTO: 271 K/UL (ref 150–350)
PMV BLD AUTO: 10.8 FL (ref 9.2–12.9)
POTASSIUM SERPL-SCNC: 4.2 MMOL/L (ref 3.5–5.1)
PROT SERPL-MCNC: 7.2 G/DL (ref 6–8.4)
RBC # BLD AUTO: 4.6 M/UL (ref 4–5.4)
SODIUM SERPL-SCNC: 139 MMOL/L (ref 136–145)
SUPPLEMENTAL DIAGNOSIS: NORMAL
WBC # BLD AUTO: 8.53 K/UL (ref 3.9–12.7)

## 2020-08-27 PROCEDURE — 3008F BODY MASS INDEX DOCD: CPT | Mod: CPTII,S$GLB,, | Performed by: SURGERY

## 2020-08-27 PROCEDURE — 85025 COMPLETE CBC W/AUTO DIFF WBC: CPT

## 2020-08-27 PROCEDURE — 80053 COMPREHEN METABOLIC PANEL: CPT

## 2020-08-27 PROCEDURE — 3008F PR BODY MASS INDEX (BMI) DOCUMENTED: ICD-10-PCS | Mod: CPTII,S$GLB,, | Performed by: SURGERY

## 2020-08-27 PROCEDURE — 36415 COLL VENOUS BLD VENIPUNCTURE: CPT

## 2020-08-27 PROCEDURE — 99215 OFFICE O/P EST HI 40 MIN: CPT | Mod: S$GLB,,, | Performed by: SURGERY

## 2020-08-27 PROCEDURE — 99215 PR OFFICE/OUTPT VISIT, EST, LEVL V, 40-54 MIN: ICD-10-PCS | Mod: S$GLB,,, | Performed by: SURGERY

## 2020-08-27 NOTE — PROGRESS NOTES
History and Physical  [facility]  Department of Surgery    CHIEF COMPLAINT: left DCIS, right complex sclerosing lesion    REFERRING:  No referring provider defined for this encounter.  Primary Doctor No      Subjective:      Altno Bach is a 39 y.o. premenopausal female referred for evaluation of a breast mass. Change was noted a while ago.  Patient does routinely do self breast exams.  Patient has noted a change on breast exam.  Patient denies nipple discharge. Patient reports to previous breast biopsy. Patient denies a personal history of breast cancer.    Reports masses in both breasts, however she thinks she felt a new one in the upper right breast. History of biopsy that was negative in the past.  Last mammogram was 5 years ago.  Does have hisotry of implants.    INTERVAL HISTORY: here to discuss biopsy results.      RIGHT breast:  Complex sclerosing lesion  LEFT breast: solid DCIS, grade 2. Receptors pending.      GYN History:  Age of menarche was 12. Age of menopause was n/a. Patient denies hormonal therapy. Patient is . Age of first live birth was 18.  Patient did not breast feed.    FAMILY history:  none    History reviewed. No pertinent past medical history.  Past Surgical History:   Procedure Laterality Date    breast augmentation      BREAST BIOPSY Right     CHOLECYSTECTOMY       Current Outpatient Medications on File Prior to Visit   Medication Sig Dispense Refill    ibuprofen (ADVIL,MOTRIN) 600 MG tablet Take 1 tablet (600 mg total) by mouth 3 (three) times daily. 30 tablet 0    multivitamin (ONE DAILY MULTIVITAMIN) per tablet Take 1 tablet by mouth once daily.      [DISCONTINUED] fluticasone (FLONASE) 50 mcg/actuation nasal spray 1-2 sprays by Each Nare route once daily. (Patient not taking: Reported on 2020) 1 Bottle 0    [DISCONTINUED] loratadine (CLARITIN) 10 mg tablet Take 1 tablet (10 mg total) by mouth once daily. (Patient not taking: Reported on 2020) 30  "tablet 0    [DISCONTINUED] ofloxacin (OCUFLOX) 0.3 % ophthalmic solution Place 1 drop into the right eye 4 (four) times daily. (Patient not taking: Reported on 8/27/2020) 5 mL 0     No current facility-administered medications on file prior to visit.      Social History     Socioeconomic History    Marital status: Single     Spouse name: Not on file    Number of children: Not on file    Years of education: Not on file    Highest education level: Not on file   Occupational History    Not on file   Social Needs    Financial resource strain: Not on file    Food insecurity     Worry: Not on file     Inability: Not on file    Transportation needs     Medical: Not on file     Non-medical: Not on file   Tobacco Use    Smoking status: Current Every Day Smoker     Packs/day: 0.50    Smokeless tobacco: Never Used   Substance and Sexual Activity    Alcohol use: No    Drug use: No    Sexual activity: Not on file   Lifestyle    Physical activity     Days per week: Not on file     Minutes per session: Not on file    Stress: Not on file   Relationships    Social connections     Talks on phone: Not on file     Gets together: Not on file     Attends Rastafarian service: Not on file     Active member of club or organization: Not on file     Attends meetings of clubs or organizations: Not on file     Relationship status: Not on file   Other Topics Concern    Not on file   Social History Narrative    Not on file     Family History   Problem Relation Age of Onset    Hypertension Mother     Heart disease Father        Review of Systems  Pertinent items are noted in HPI.       Objective:        /84 (BP Location: Right arm, Patient Position: Sitting, BP Method: Medium (Automatic))   Pulse 90   Ht 5' 7" (1.702 m)   Wt 61.2 kg (134 lb 14.7 oz)   BMI 21.13 kg/m²   General appearance: alert, appears stated age and cooperative  Head: Normocephalic, without obvious abnormality, atraumatic  Neck: no adenopathy and " supple, symmetrical, trachea midline  Lungs: normal effort, nonlabored breathing  Breasts: No nipple retraction or dimpling, No nipple discharge or bleeding, No axillary or supraclavicular adenopathy, positive findings: thickenin the right UOQ.   Extremities: extremities normal, atraumatic, no cyanosis or edema  Skin: normal, no edema and no lesions noted  Lymph nodes: Cervical, supraclavicular, and axillary nodes normal.  Neurologic: Grossly normal    IMAGING: reviewed    Assessment:      Alton Bach is a 39 y.o. premenopausal female with bilateral breast masses, overdue for imaging.       Plan:    Options for management were discussed with the patient and her family. We reviewed the existing data noting the equivalency of breast conserving surgery with radiation therapy and mastectomy. We also reviewed the guidelines of the National Comprehensive Cancer Network for DCIS.  We discussed the need for lumpectomy margins to be negative for carcinoma and the necessity for postoperative radiation therapy after breast conservation in most cases. In the setting of mastectomy, delayed or immediate reconstruction options are available and were discussed.  We discussed the chance of upstaging to an invasive carcinoma at the time of surgery, potentially requiring more surgery (such as SLNB) if this occurs.    The need for SLNB depends on tumor biology as well as size and location of the DCIS.  If in the upper outer quadrant, it is difficult to map to the axillary nodes so SLNB is usually performed. The possibility of a failed or false negative sentinel lymph node biopsy and the potential need for complete lymphadenectomy for a failed or positive sentinel lymph node biopsy were fully discussed.    In the setting of lumpectomy, radiation therapy would be recommended majority of the time.  The duration and treatment side effects were discussed with the patient.  This will coordinated with the radiation oncologist pending  final pathology.    We also discussed the role of systemic therapy in the treatment of DCIS with endocrine therapy if the DCIS is ER and/or VT positive.  We discussed that this is based on tumor biology and rosamaria status and will be determined based on final pathology.  We discussed that if the DCIS is hormone positive, endocrine therapy may be recommended and its use can reduce the risk of recurrence. Side effects of treatment were briefly discussed. We also discussed that chemotherapy is not recommended in the setting of DCIS.     Patient was educated on DCIS, receptors, wire localization lumpectomy, mastectomy, sentinel lymph node mapping and biopsy, axillary lymph node dissection, reconstruction, breast prosthesis with post-mastectomy bra and radiation therapy. Patient was given patient information binder including [facility] breast cancer treatment brochure.  All her questions were answered.    Total time spent with the patient: 60 minutes.  45 minutes of face to face consultation and 15 minutes of chart review and coordination of care.      MRI  Genetics  Lump with implant removal/lift vs bilateral mastectomy with implant recon. Will need RIGHT excisional biopsy for CSL as well if proceed with LEFT lumpectomy for DCIS.  Discussed smoking cessation. Unable to do nipple sparing if still smoking.    Has textured implants so should likely be removed regardless.

## 2020-08-31 ENCOUNTER — TELEPHONE (OUTPATIENT)
Dept: PLASTIC SURGERY | Facility: CLINIC | Age: 40
End: 2020-08-31

## 2020-08-31 NOTE — TELEPHONE ENCOUNTER
I attempted to contact pt to schedule consult.  Pt was not available at the time of the call.  I left a detailed VM asking pt to call PLS office at her convenience.

## 2020-09-02 ENCOUNTER — HOSPITAL ENCOUNTER (OUTPATIENT)
Dept: RADIOLOGY | Facility: HOSPITAL | Age: 40
Discharge: HOME | End: 2020-09-02
Attending: SURGERY
Payer: COMMERCIAL

## 2020-09-02 DIAGNOSIS — D05.12 DUCTAL CARCINOMA IN SITU (DCIS) OF LEFT BREAST: ICD-10-CM

## 2020-09-02 PROCEDURE — A9577 INJ MULTIHANCE: HCPCS | Performed by: SURGERY

## 2020-09-02 PROCEDURE — 77049 MRI BREAST C-+ W/CAD BI: CPT | Mod: 26,,, | Performed by: RADIOLOGY

## 2020-09-02 PROCEDURE — 77049 MRI BREAST W/WO CONTRAST, W/CAD, BILATERAL: ICD-10-PCS | Mod: 26,,, | Performed by: RADIOLOGY

## 2020-09-02 PROCEDURE — 77049 MRI BREAST C-+ W/CAD BI: CPT | Mod: TC

## 2020-09-02 PROCEDURE — 25500020 PHARM REV CODE 255: Performed by: SURGERY

## 2020-09-02 RX ADMIN — GADOBENATE DIMEGLUMINE 13 ML: 529 INJECTION, SOLUTION INTRAVENOUS at 08:09

## 2020-09-09 ENCOUNTER — OFFICE VISIT (OUTPATIENT)
Dept: PLASTIC SURGERY | Facility: CLINIC | Age: 40
End: 2020-09-09
Payer: COMMERCIAL

## 2020-09-09 VITALS
HEART RATE: 82 BPM | BODY MASS INDEX: 21.18 KG/M2 | SYSTOLIC BLOOD PRESSURE: 151 MMHG | DIASTOLIC BLOOD PRESSURE: 63 MMHG | WEIGHT: 134.94 LBS | HEIGHT: 67 IN

## 2020-09-09 DIAGNOSIS — D05.92 CARCINOMA IN SITU OF LEFT BREAST, UNSPECIFIED TYPE: Primary | ICD-10-CM

## 2020-09-09 DIAGNOSIS — D05.12 DUCTAL CARCINOMA IN SITU (DCIS) OF LEFT BREAST: ICD-10-CM

## 2020-09-09 PROCEDURE — 3008F PR BODY MASS INDEX (BMI) DOCUMENTED: ICD-10-PCS | Mod: CPTII,S$GLB,, | Performed by: SURGERY

## 2020-09-09 PROCEDURE — 99999 PR PBB SHADOW E&M-EST. PATIENT-LVL III: ICD-10-PCS | Mod: PBBFAC,,, | Performed by: SURGERY

## 2020-09-09 PROCEDURE — 99203 OFFICE O/P NEW LOW 30 MIN: CPT | Mod: S$GLB,,, | Performed by: SURGERY

## 2020-09-09 PROCEDURE — 99999 PR PBB SHADOW E&M-EST. PATIENT-LVL III: CPT | Mod: PBBFAC,,, | Performed by: SURGERY

## 2020-09-09 PROCEDURE — 99203 PR OFFICE/OUTPT VISIT, NEW, LEVL III, 30-44 MIN: ICD-10-PCS | Mod: S$GLB,,, | Performed by: SURGERY

## 2020-09-09 PROCEDURE — 3008F BODY MASS INDEX DOCD: CPT | Mod: CPTII,S$GLB,, | Performed by: SURGERY

## 2020-09-09 NOTE — PROGRESS NOTES
Subjective:      Alton Bach is a 39 y.o. year old female who presents to the Plastic Surgery Clinic on 09/09/2020 for consultation regarding breast reconstruction following mastectomy. 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 Patient saw Dr. Huitron, and her options for surgical management were discussed in detail at that visit. She states that she has elected to proceed with bilateral mastectomy with explant of her previous implants, and is here today to discuss reconstructive options. She states that she had textured implants placed in 2006 with Dr. Uriarte. Denies fever, chills, nausea, vomiting, or other systemic signs of infection.    Vitals:    09/09/20 1531   BP: (!) 151/63   Pulse: 82        Review of patient's allergies indicates:   Allergen Reactions    Coconut     Strawberry        Current Outpatient Medications on File Prior to Visit   Medication Sig Dispense Refill    ibuprofen (ADVIL,MOTRIN) 600 MG tablet Take 1 tablet (600 mg total) by mouth 3 (three) times daily. 30 tablet 0    multivitamin (ONE DAILY MULTIVITAMIN) per tablet Take 1 tablet by mouth once daily.       No current facility-administered medications on file prior to visit.        Patient Active Problem List   Diagnosis    Groin strain       Past Surgical History:   Procedure Laterality Date    breast augmentation      BREAST BIOPSY Right     CHOLECYSTECTOMY         Social History     Socioeconomic History    Marital status: Single     Spouse name: Not on file    Number of children: Not on file    Years of education: Not on file    Highest education level: Not on file   Occupational History    Not on file   Social Needs    Financial resource strain: Not on file    Food insecurity     Worry: Not on file     Inability: Not on file    Transportation needs     Medical: Not on file     Non-medical: Not on file   Tobacco Use     Smoking status: Current Every Day Smoker     Packs/day: 0.50    Smokeless tobacco: Never Used   Substance and Sexual Activity    Alcohol use: No    Drug use: No    Sexual activity: Not on file   Lifestyle    Physical activity     Days per week: Not on file     Minutes per session: Not on file    Stress: Not on file   Relationships    Social connections     Talks on phone: Not on file     Gets together: Not on file     Attends Latter-day service: Not on file     Active member of club or organization: Not on file     Attends meetings of clubs or organizations: Not on file     Relationship status: Not on file   Other Topics Concern    Not on file   Social History Narrative    Not on file           Review of Systems: negative except as otherwise stated    Objective:     Physical Exam:  Vitals:    09/09/20 1531   BP: (!) 151/63   Pulse: 82       WD WN NAD  VSS  Normal resp effort  Bilateral implants in place        Assessment:       Alton Bach is a 39 year old female with a history of recently diagnosed DCIS of the left breast presenting today to discuss reconstructive options after planned bilateral mastectomy    Plan:   - Patient was seen and evaluated by myself and Dr. Haresh Porter    - Per patient, Dr. Huitron is planning for bilateral nipple sparing mastectomy. Discussed with patient options for reconstruction including delayed reconstruction, tissue expanders, and direct implant. Patient expressed understanding and will be scheduled for surgery  - Risks, benefits and alternatives to surgery were discussed. Will submit for insurance authorization.  - Office staff to coordinate surgery date once insurance authorization obtained      All questions were answered. The patient was advised to call the clinic with any questions or concerns prior to their next visit.

## 2020-09-09 NOTE — LETTER
[facility]  [address]  [facility] [address]  Phone: [phone]  Fax: [phone] September 16, 2020      Anahi Huitron MD  [address]  [facility] [place] [address]    Patient: Alton Bach   MR Number: [id number]   YOB: 1980   Date of Visit: 9/9/2020     Dear Dr. Anahi Huitron:    Thank you for referring Alton Bach to me for evaluation. Below you will find relevant portions of my assessment and plan of care.    Ms. Bach is a 39-year-old female with a history of recently diagnosed DCIS of the left breast presenting today to discuss reconstructive options after planned bilateral mastectomy.    Per the patient, Dr. Huitron is planning for bilateral nipple sparing mastectomy. Discussed with patient options for reconstruction including delayed reconstruction, tissue expanders, and direct implant. Patient expressed understanding and will be scheduled for surgery. Risks, benefits and alternatives to surgery were discussed. We will submit for insurance authorization. Office staff to coordinate surgery date once insurance authorization is obtained.    If you have questions, please do not hesitate to call me. I look forward to following Alton Bach along with you.    Sincerely,    Haresh Porter MD  Section of Plastic Surgery  Department of Surgery  [facility]     CRB/hcr

## 2020-09-14 LAB — INTEGRATED BRAC ANALYSIS: NORMAL

## 2020-09-24 RX ORDER — SODIUM CHLORIDE 9 MG/ML
INJECTION, SOLUTION INTRAVENOUS CONTINUOUS
Status: CANCELLED | OUTPATIENT
Start: 2020-09-24

## 2020-10-09 ENCOUNTER — ANESTHESIA EVENT (OUTPATIENT)
Dept: SURGERY | Facility: HOSPITAL | Age: 40
End: 2020-10-09
Payer: COMMERCIAL

## 2020-10-09 ENCOUNTER — TELEPHONE (OUTPATIENT)
Dept: SURGERY | Facility: CLINIC | Age: 40
End: 2020-10-09

## 2020-10-09 ENCOUNTER — LAB VISIT (OUTPATIENT)
Dept: SURGERY | Facility: CLINIC | Age: 40
End: 2020-10-09
Payer: COMMERCIAL

## 2020-10-09 DIAGNOSIS — Z01.818 PREOP TESTING: ICD-10-CM

## 2020-10-09 LAB — SARS-COV-2 RNA RESP QL NAA+PROBE: NOT DETECTED

## 2020-10-09 PROCEDURE — U0003 INFECTIOUS AGENT DETECTION BY NUCLEIC ACID (DNA OR RNA); SEVERE ACUTE RESPIRATORY SYNDROME CORONAVIRUS 2 (SARS-COV-2) (CORONAVIRUS DISEASE [COVID-19]), AMPLIFIED PROBE TECHNIQUE, MAKING USE OF HIGH THROUGHPUT TECHNOLOGIES AS DESCRIBED BY CMS-2020-01-R: HCPCS

## 2020-10-09 NOTE — PRE-PROCEDURE INSTRUCTIONS
PREOP INSTRUCTIONS:No solid food ,milk or milk products for 8 hours prior to procedure.Clear liquids are allowed up to 2 hours before procedure.Clear liquids are:water,apple juice,gatorade & powerade.Patient instructed to follow the surgeon's instructions if they differ from these.Shower instructions as well as directions to the Surgery Center were given.Patient encouraged to wear loose fitting,comfortable clothing.Medication instructions for pm prior to and am of procedure reviewed.Instructed patient to avoid taking vitamins,supplements,aspirin and ibuprofen the morning of surgery. Patient stated an understanding.Patient informed of the current visitor policy and advised patient that one visitor may accompany each patient into the hospital and wait (socially distanced) until a member of the medical team provides an update at the conclusion of the procedure.When they enter the hospital both patient and visitor will have their temperature checked.All visitors are asked to arrive with a mask and to keep their mask on throughout the visit.Each inpatient is allowed 1 visitor at a time per day between the hours of 8am and 6pm.This visitor must remain in the patient's room and not gather in common areas such as waiting rooms or cafeterias.The visitor must be 18 years or older and arrive with a mask and keep the mask on throughout the visit.    Covid test 10/9/2020-Result pending    Patient denies any side effects or issues with anesthesia or sedation.    Patient does not know arrival time.Explained that this information comes from the surgeon's office and if they haven't heard from them by  3 pm to call the office.Patient stated an understanding.

## 2020-10-09 NOTE — TELEPHONE ENCOUNTER
Spoke to patient to give surgery arrival time of 0500 on Monday.  Covid test results still pending.    Gave patient the following results from her genetic test.  Your [employer] Genetic Test  results are NEGATIVE for any genetic mutations.  Although they are NEGATIVE for any genetic mutations that would increase your risk for cancer, you can still call the [employer] genetic counselor for further information. The number is provided in the packet with the results that I have put in the mail for you.

## 2020-10-11 NOTE — ANESTHESIA PREPROCEDURE EVALUATION
[facility]  Anesthesia Pre-Operative Evaluation         Patient Name: Alton Bach  YOB: 1980  MRN: [id number]    SUBJECTIVE:     Pre-operative evaluation for Procedure(s) (LRB):  MASTECTOMY-Bilateral Consent morning of surgery; Frozen Section, Noeprobe (Bilateral)  BIOPSY, LYMPH NODE, SENTINEL-Bilateral (Bilateral)  REPLACEMENT, IMPLANT, BREAST-Bilateral (Bilateral)     10/11/2020    Alton Bach is a 39 y.o. female w/ a significant PMHx of tobacco abuse and ductal carcinoma in-situ of left breast who presents for the above procedure.      LDA: None documented.    Prev airway: None documented.     Previous surgeries include cholecystectomy, breast biopsy, and breast augmentation.  No records of these procedures in our epic system.      Drips: None documented.    Patient Active Problem List   Diagnosis    Groin strain       Review of patient's allergies indicates:   Allergen Reactions    Coconut Swelling and Rash    Strawberry Swelling and Rash       Current Inpatient Medications:      No current facility-administered medications on file prior to encounter.      Current Outpatient Medications on File Prior to Encounter   Medication Sig Dispense Refill    multivitamin (ONE DAILY MULTIVITAMIN) per tablet Take 1 tablet by mouth once daily.      ibuprofen (ADVIL,MOTRIN) 600 MG tablet Take 1 tablet (600 mg total) by mouth 3 (three) times daily. 30 tablet 0       Past Surgical History:   Procedure Laterality Date    breast augmentation      BREAST BIOPSY Right     CHOLECYSTECTOMY         Social History     Socioeconomic History    Marital status: Single     Spouse name: Not on file    Number of children: Not on file    Years of education: Not on file    Highest education level: Not on file   Occupational History    Not on file   Social Needs    Financial resource strain: Not on file    Food insecurity     Worry: Not on file     Inability: Not on file     Transportation needs     Medical: Not on file     Non-medical: Not on file   Tobacco Use    Smoking status: Current Every Day Smoker     Packs/day: 0.50    Smokeless tobacco: Never Used   Substance and Sexual Activity    Alcohol use: No    Drug use: No    Sexual activity: Not on file   Lifestyle    Physical activity     Days per week: Not on file     Minutes per session: Not on file    Stress: Not on file   Relationships    Social connections     Talks on phone: Not on file     Gets together: Not on file     Attends Hoahaoism service: Not on file     Active member of club or organization: Not on file     Attends meetings of clubs or organizations: Not on file     Relationship status: Not on file   Other Topics Concern    Not on file   Social History Narrative    Not on file       OBJECTIVE:     Vital Signs Range (Last 24H):         CBC:   No results for input(s): WBC, RBC, HGB, HCT, PLT, MCV, MCH, MCHC in the last 72 hours.    CMP: No results for input(s): NA, K, CL, CO2, BUN, CREATININE, GLU, MG, PHOS, CALCIUM, ALBUMIN, PROT, ALKPHOS, ALT, AST, BILITOT in the last 72 hours.    INR:  No results for input(s): PT, INR, PROTIME, APTT in the last 72 hours.    Diagnostic Studies: No relevant studies.    EKG: No recent studies available.    2D ECHO:  No results found for this or any previous visit.      ASSESSMENT/PLAN:                                                                                                                    10/11/2020  Alton Bach is a 39 y.o., female.    Anesthesia Evaluation    I have reviewed the Patient Summary Reports.    I have reviewed the Nursing Notes. I have reviewed the NPO Status.   I have reviewed the Medications.     Review of Systems  Anesthesia Hx:  No problems with previous Anesthesia  History of prior surgery of interest to airway management or planning: Denies Family Hx of Anesthesia complications.   Denies Personal Hx of Anesthesia complications.    Social:  Smoker    Hematology/Oncology:  Hematology Normal      Current/Recent Cancer. Breast left   EENT/Dental:EENT/Dental Normal   Cardiovascular:  Cardiovascular Normal     Pulmonary:  Pulmonary Normal    Renal/:  Renal/ Normal     Hepatic/GI:  Hepatic/GI Normal    Musculoskeletal:  Musculoskeletal Normal    Neurological:  Neurology Normal    Endocrine:  Endocrine Normal    Dermatological:  Skin Normal    Psych:  Psychiatric Normal           Physical Exam  General:  Well nourished    Airway/Jaw/Neck:  Airway Findings: Mouth Opening: Small, but > 3cm Tongue: Normal  General Airway Assessment: Adult  Mallampati: II  TM Distance: 4 - 6 cm  Jaw/Neck Findings:  Neck ROM: Normal ROM      Dental:  Dental Findings: In tact   Chest/Lungs:  Chest/Lungs Findings: Clear to auscultation, Normal Respiratory Rate     Heart/Vascular:  Heart Findings: Rate: Normal  Rhythm: Regular Rhythm  Sounds: Normal     Abdomen:  Abdomen Findings: Normal    Musculoskeletal:  Musculoskeletal Findings: Normal   Skin:  Skin Findings: Normal    Mental Status:  Mental Status Findings:  Cooperative, Alert and Oriented         Anesthesia Plan  Type of Anesthesia, risks & benefits discussed:  Anesthesia Type:  general, regional  Patient's Preference: paravertebral blocks b/l  Intra-op Monitoring Plan: standard ASA monitors  Intra-op Monitoring Plan Comments:   Post Op Pain Control Plan: multimodal analgesia, IV/PO Opioids PRN and per primary service following discharge from PACU  Post Op Pain Control Plan Comments:   Induction:   IV  Beta Blocker:  Patient is not currently on a Beta-Blocker (No further documentation required).       Informed Consent: Patient understands risks and agrees with Anesthesia plan.  Questions answered. Anesthesia consent signed with patient.  ASA Score: 3     Day of Surgery Review of History & Physical: I have interviewed and examined the patient. I have reviewed the patient's H&P dated:  There are no significant  changes.  H&P update referred to the surgeon.         Ready For Surgery From Anesthesia Perspective.

## 2020-10-12 ENCOUNTER — ANESTHESIA (OUTPATIENT)
Dept: SURGERY | Facility: HOSPITAL | Age: 40
End: 2020-10-12
Payer: COMMERCIAL

## 2020-10-12 ENCOUNTER — HOSPITAL ENCOUNTER (OUTPATIENT)
Dept: RADIOLOGY | Facility: HOSPITAL | Age: 40
Discharge: HOME | End: 2020-10-12
Attending: SURGERY | Admitting: SURGERY
Payer: COMMERCIAL

## 2020-10-12 ENCOUNTER — HOSPITAL ENCOUNTER (OUTPATIENT)
Facility: HOSPITAL | Age: 40
Discharge: HOME | End: 2020-10-12
Attending: SURGERY | Admitting: SURGERY
Payer: COMMERCIAL

## 2020-10-12 VITALS
SYSTOLIC BLOOD PRESSURE: 131 MMHG | DIASTOLIC BLOOD PRESSURE: 75 MMHG | HEIGHT: 67 IN | OXYGEN SATURATION: 100 % | TEMPERATURE: 98 F | BODY MASS INDEX: 21.19 KG/M2 | RESPIRATION RATE: 18 BRPM | WEIGHT: 135 LBS | HEART RATE: 69 BPM

## 2020-10-12 DIAGNOSIS — D05.12 DUCTAL CARCINOMA IN SITU (DCIS) OF LEFT BREAST: ICD-10-CM

## 2020-10-12 DIAGNOSIS — D05.12 DUCTAL CARCINOMA IN SITU (DCIS) OF LEFT BREAST: Primary | ICD-10-CM

## 2020-10-12 LAB
B-HCG UR QL: NEGATIVE
CTP QC/QA: YES

## 2020-10-12 PROCEDURE — 71000015 HC POSTOP RECOV 1ST HR: Performed by: SURGERY

## 2020-10-12 PROCEDURE — D9220A PRA ANESTHESIA: ICD-10-PCS | Mod: ,,, | Performed by: ANESTHESIOLOGY

## 2020-10-12 PROCEDURE — 37000008 HC ANESTHESIA 1ST 15 MINUTES: Performed by: SURGERY

## 2020-10-12 PROCEDURE — D9220A PRA ANESTHESIA: Mod: ,,, | Performed by: ANESTHESIOLOGY

## 2020-10-12 PROCEDURE — 88342 CHG IMMUNOCYTOCHEMISTRY: ICD-10-PCS | Mod: 26,,, | Performed by: PATHOLOGY

## 2020-10-12 PROCEDURE — 25000003 PHARM REV CODE 250: Performed by: SURGERY

## 2020-10-12 PROCEDURE — 19340 INSJ BREAST IMPLT SM D MAST: CPT | Mod: 50,,, | Performed by: SURGERY

## 2020-10-12 PROCEDURE — 88333 PR  INTRAOPERATIVE CYTO PATH CONSULT, INITIAL SITE: ICD-10-PCS | Mod: 26,,, | Performed by: PATHOLOGY

## 2020-10-12 PROCEDURE — 88307 PR  SURG PATH,LEVEL V: ICD-10-PCS | Mod: 26,,, | Performed by: PATHOLOGY

## 2020-10-12 PROCEDURE — 88300 SURGICAL PATH GROSS: CPT | Mod: 26,59,, | Performed by: PATHOLOGY

## 2020-10-12 PROCEDURE — 19303 MAST SIMPLE COMPLETE: CPT | Mod: 50,,, | Performed by: SURGERY

## 2020-10-12 PROCEDURE — 88333 PATH CONSLTJ SURG CYTO XM 1: CPT | Performed by: PATHOLOGY

## 2020-10-12 PROCEDURE — 88307 TISSUE EXAM BY PATHOLOGIST: CPT | Mod: 59 | Performed by: PATHOLOGY

## 2020-10-12 PROCEDURE — 37000009 HC ANESTHESIA EA ADD 15 MINS: Performed by: SURGERY

## 2020-10-12 PROCEDURE — 36000707: Performed by: SURGERY

## 2020-10-12 PROCEDURE — 88300 PR  SURG PATH,GROSS,LEVEL I: ICD-10-PCS | Mod: 26,59,, | Performed by: PATHOLOGY

## 2020-10-12 PROCEDURE — 64461 PVB THORACIC SINGLE INJ SITE: CPT | Performed by: STUDENT IN AN ORGANIZED HEALTH CARE EDUCATION/TRAINING PROGRAM

## 2020-10-12 PROCEDURE — 19340 PR INSERT BREAST PROS IMMED AFTER EXCIS: ICD-10-PCS | Mod: 50,,, | Performed by: SURGERY

## 2020-10-12 PROCEDURE — 38525 PR BIOPSY/REM LYMPH NODES, AXILLARY: ICD-10-PCS | Mod: 50,51,, | Performed by: SURGERY

## 2020-10-12 PROCEDURE — 63600175 PHARM REV CODE 636 W HCPCS: Performed by: STUDENT IN AN ORGANIZED HEALTH CARE EDUCATION/TRAINING PROGRAM

## 2020-10-12 PROCEDURE — 25000003 PHARM REV CODE 250: Performed by: STUDENT IN AN ORGANIZED HEALTH CARE EDUCATION/TRAINING PROGRAM

## 2020-10-12 PROCEDURE — 38792 PR IDENTIFY SENTINEL 2DE: ICD-10-PCS | Mod: 50,,, | Performed by: SURGERY

## 2020-10-12 PROCEDURE — 38792 RA TRACER ID OF SENTINL NODE: CPT | Mod: 50,,, | Performed by: SURGERY

## 2020-10-12 PROCEDURE — 71000039 HC RECOVERY, EACH ADD'L HOUR: Performed by: SURGERY

## 2020-10-12 PROCEDURE — 71000016 HC POSTOP RECOV ADDL HR: Performed by: SURGERY

## 2020-10-12 PROCEDURE — 64461 PVB THORACIC SINGLE INJ SITE: CPT | Mod: 59,LT,, | Performed by: STUDENT IN AN ORGANIZED HEALTH CARE EDUCATION/TRAINING PROGRAM

## 2020-10-12 PROCEDURE — 88341 IMHCHEM/IMCYTCHM EA ADD ANTB: CPT | Mod: 26,,, | Performed by: PATHOLOGY

## 2020-10-12 PROCEDURE — 64461 PVB THORACIC SINGLE INJ SITE: CPT | Mod: 59,RT,, | Performed by: STUDENT IN AN ORGANIZED HEALTH CARE EDUCATION/TRAINING PROGRAM

## 2020-10-12 PROCEDURE — 88341 IMHCHEM/IMCYTCHM EA ADD ANTB: CPT | Performed by: PATHOLOGY

## 2020-10-12 PROCEDURE — 19303 PR MASTECTOMY, SIMPLE, COMPLETE: ICD-10-PCS | Mod: 50,,, | Performed by: SURGERY

## 2020-10-12 PROCEDURE — 88333 PATH CONSLTJ SURG CYTO XM 1: CPT | Mod: 26,,, | Performed by: PATHOLOGY

## 2020-10-12 PROCEDURE — A9520 TC99 TILMANOCEPT DIAG 0.5MCI: HCPCS

## 2020-10-12 PROCEDURE — 15777 ACELLULAR DERM MATRIX IMPLT: CPT | Mod: 50,,, | Performed by: SURGERY

## 2020-10-12 PROCEDURE — 81025 URINE PREGNANCY TEST: CPT | Performed by: SURGERY

## 2020-10-12 PROCEDURE — 63600175 PHARM REV CODE 636 W HCPCS: Performed by: SURGERY

## 2020-10-12 PROCEDURE — 64461 PARAVERTEBRAL SINGLE INJECTION BLOCK(S): ICD-10-PCS | Mod: 59,RT,, | Performed by: STUDENT IN AN ORGANIZED HEALTH CARE EDUCATION/TRAINING PROGRAM

## 2020-10-12 PROCEDURE — 88307 TISSUE EXAM BY PATHOLOGIST: CPT | Mod: 26,,, | Performed by: PATHOLOGY

## 2020-10-12 PROCEDURE — C1789 PROSTHESIS, BREAST, IMP: HCPCS | Performed by: SURGERY

## 2020-10-12 PROCEDURE — 88341 PR IHC OR ICC EACH ADD'L SINGLE ANTIBODY  STAINPR: ICD-10-PCS | Mod: 26,,, | Performed by: PATHOLOGY

## 2020-10-12 PROCEDURE — 38525 BIOPSY/REMOVAL LYMPH NODES: CPT | Mod: 50,51,, | Performed by: SURGERY

## 2020-10-12 PROCEDURE — 88342 IMHCHEM/IMCYTCHM 1ST ANTB: CPT | Mod: 26,,, | Performed by: PATHOLOGY

## 2020-10-12 PROCEDURE — 88300 SURGICAL PATH GROSS: CPT | Mod: 59 | Performed by: PATHOLOGY

## 2020-10-12 PROCEDURE — C1729 CATH, DRAINAGE: HCPCS | Performed by: SURGERY

## 2020-10-12 PROCEDURE — 36000706: Performed by: SURGERY

## 2020-10-12 PROCEDURE — 15777 PR ACELLULAR DERM MATRIX IMPLT: ICD-10-PCS | Mod: 50,,, | Performed by: SURGERY

## 2020-10-12 PROCEDURE — 71000033 HC RECOVERY, INTIAL HOUR: Performed by: SURGERY

## 2020-10-12 PROCEDURE — 94761 N-INVAS EAR/PLS OXIMETRY MLT: CPT

## 2020-10-12 PROCEDURE — 88342 IMHCHEM/IMCYTCHM 1ST ANTB: CPT | Mod: 59 | Performed by: PATHOLOGY

## 2020-10-12 DEVICE — IMPLANTABLE DEVICE: Type: IMPLANTABLE DEVICE | Site: BREAST | Status: FUNCTIONAL

## 2020-10-12 RX ORDER — ROCURONIUM BROMIDE 10 MG/ML
INJECTION, SOLUTION INTRAVENOUS
Status: DISCONTINUED | OUTPATIENT
Start: 2020-10-12 | End: 2020-10-12

## 2020-10-12 RX ORDER — FENTANYL CITRATE 50 UG/ML
25 INJECTION, SOLUTION INTRAMUSCULAR; INTRAVENOUS EVERY 5 MIN PRN
Status: DISCONTINUED | OUTPATIENT
Start: 2020-10-12 | End: 2020-10-12 | Stop reason: HOSPADM

## 2020-10-12 RX ORDER — PHENYLEPHRINE HYDROCHLORIDE 10 MG/ML
INJECTION INTRAVENOUS
Status: DISCONTINUED | OUTPATIENT
Start: 2020-10-12 | End: 2020-10-12

## 2020-10-12 RX ORDER — CEFAZOLIN SODIUM 1 G/3ML
2 INJECTION, POWDER, FOR SOLUTION INTRAMUSCULAR; INTRAVENOUS
Status: DISCONTINUED | OUTPATIENT
Start: 2020-10-12 | End: 2020-10-12 | Stop reason: HOSPADM

## 2020-10-12 RX ORDER — PROPOFOL 10 MG/ML
VIAL (ML) INTRAVENOUS
Status: DISCONTINUED | OUTPATIENT
Start: 2020-10-12 | End: 2020-10-12

## 2020-10-12 RX ORDER — CIPROFLOXACIN 2 MG/ML
INJECTION, SOLUTION INTRAVENOUS
Status: COMPLETED | OUTPATIENT
Start: 2020-10-12 | End: 2020-10-12

## 2020-10-12 RX ORDER — LIDOCAINE HYDROCHLORIDE 20 MG/ML
INJECTION, SOLUTION EPIDURAL; INFILTRATION; INTRACAUDAL; PERINEURAL
Status: DISCONTINUED | OUTPATIENT
Start: 2020-10-12 | End: 2020-10-12

## 2020-10-12 RX ORDER — OXYCODONE AND ACETAMINOPHEN 5; 325 MG/1; MG/1
1 TABLET ORAL EVERY 6 HOURS PRN
Qty: 43 TABLET | Refills: 0 | Status: SHIPPED | OUTPATIENT
Start: 2020-10-12 | End: 2021-12-02

## 2020-10-12 RX ORDER — SODIUM CHLORIDE 9 MG/ML
INJECTION, SOLUTION INTRAVENOUS CONTINUOUS
Status: DISCONTINUED | OUTPATIENT
Start: 2020-10-12 | End: 2020-10-12 | Stop reason: HOSPADM

## 2020-10-12 RX ORDER — CLINDAMYCIN HYDROCHLORIDE 300 MG/1
300 CAPSULE ORAL EVERY 8 HOURS
Qty: 15 CAPSULE | Refills: 0 | Status: SHIPPED | OUTPATIENT
Start: 2020-10-12 | End: 2020-10-17

## 2020-10-12 RX ORDER — CEFAZOLIN SODIUM 1 G/3ML
INJECTION, POWDER, FOR SOLUTION INTRAMUSCULAR; INTRAVENOUS
Status: DISCONTINUED | OUTPATIENT
Start: 2020-10-12 | End: 2020-10-12 | Stop reason: HOSPADM

## 2020-10-12 RX ORDER — ONDANSETRON 2 MG/ML
INJECTION INTRAMUSCULAR; INTRAVENOUS
Status: DISCONTINUED | OUTPATIENT
Start: 2020-10-12 | End: 2020-10-12

## 2020-10-12 RX ORDER — OXYCODONE AND ACETAMINOPHEN 5; 325 MG/1; MG/1
1 TABLET ORAL ONCE
Status: COMPLETED | OUTPATIENT
Start: 2020-10-12 | End: 2020-10-12

## 2020-10-12 RX ORDER — FAMOTIDINE 10 MG/ML
INJECTION INTRAVENOUS
Status: DISCONTINUED | OUTPATIENT
Start: 2020-10-12 | End: 2020-10-12

## 2020-10-12 RX ORDER — SODIUM CHLORIDE 0.9 % (FLUSH) 0.9 %
10 SYRINGE (ML) INJECTION
Status: DISCONTINUED | OUTPATIENT
Start: 2020-10-12 | End: 2020-10-12 | Stop reason: HOSPADM

## 2020-10-12 RX ORDER — HYDROMORPHONE HYDROCHLORIDE 1 MG/ML
0.2 INJECTION, SOLUTION INTRAMUSCULAR; INTRAVENOUS; SUBCUTANEOUS EVERY 5 MIN PRN
Status: DISCONTINUED | OUTPATIENT
Start: 2020-10-12 | End: 2020-10-12 | Stop reason: HOSPADM

## 2020-10-12 RX ORDER — KETAMINE HCL IN 0.9 % NACL 50 MG/5 ML
SYRINGE (ML) INTRAVENOUS
Status: DISCONTINUED | OUTPATIENT
Start: 2020-10-12 | End: 2020-10-12

## 2020-10-12 RX ORDER — CEFAZOLIN SODIUM 1 G/3ML
INJECTION, POWDER, FOR SOLUTION INTRAMUSCULAR; INTRAVENOUS
Status: DISCONTINUED | OUTPATIENT
Start: 2020-10-12 | End: 2020-10-12

## 2020-10-12 RX ORDER — BUPIVACAINE HYDROCHLORIDE AND EPINEPHRINE 5; 5 MG/ML; UG/ML
INJECTION, SOLUTION EPIDURAL; INTRACAUDAL; PERINEURAL
Status: DISCONTINUED | OUTPATIENT
Start: 2020-10-12 | End: 2020-10-12

## 2020-10-12 RX ORDER — SUCCINYLCHOLINE CHLORIDE 20 MG/ML
INJECTION INTRAMUSCULAR; INTRAVENOUS
Status: DISCONTINUED | OUTPATIENT
Start: 2020-10-12 | End: 2020-10-12

## 2020-10-12 RX ORDER — HALOPERIDOL 5 MG/ML
0.5 INJECTION INTRAMUSCULAR
Status: DISCONTINUED | OUTPATIENT
Start: 2020-10-12 | End: 2020-10-12 | Stop reason: HOSPADM

## 2020-10-12 RX ORDER — DEXMEDETOMIDINE HYDROCHLORIDE 100 UG/ML
INJECTION, SOLUTION INTRAVENOUS
Status: DISCONTINUED | OUTPATIENT
Start: 2020-10-12 | End: 2020-10-12

## 2020-10-12 RX ORDER — SODIUM CHLORIDE 9 MG/ML
INJECTION, SOLUTION INTRAVENOUS CONTINUOUS PRN
Status: DISCONTINUED | OUTPATIENT
Start: 2020-10-12 | End: 2020-10-12

## 2020-10-12 RX ORDER — DEXAMETHASONE SODIUM PHOSPHATE 4 MG/ML
INJECTION, SOLUTION INTRA-ARTICULAR; INTRALESIONAL; INTRAMUSCULAR; INTRAVENOUS; SOFT TISSUE
Status: DISCONTINUED | OUTPATIENT
Start: 2020-10-12 | End: 2020-10-12

## 2020-10-12 RX ORDER — MIDAZOLAM HYDROCHLORIDE 1 MG/ML
0.5 INJECTION INTRAMUSCULAR; INTRAVENOUS
Status: DISCONTINUED | OUTPATIENT
Start: 2020-10-12 | End: 2020-10-12 | Stop reason: HOSPADM

## 2020-10-12 RX ORDER — ACETAMINOPHEN 500 MG
1000 TABLET ORAL
Status: COMPLETED | OUTPATIENT
Start: 2020-10-12 | End: 2020-10-12

## 2020-10-12 RX ORDER — LIDOCAINE HYDROCHLORIDE 10 MG/ML
INJECTION, SOLUTION EPIDURAL; INFILTRATION; INTRACAUDAL; PERINEURAL
Status: DISCONTINUED
Start: 2020-10-12 | End: 2020-10-12 | Stop reason: HOSPADM

## 2020-10-12 RX ADMIN — Medication 10 MG: at 10:10

## 2020-10-12 RX ADMIN — SODIUM CHLORIDE, SODIUM GLUCONATE, SODIUM ACETATE, POTASSIUM CHLORIDE, MAGNESIUM CHLORIDE, SODIUM PHOSPHATE, DIBASIC, AND POTASSIUM PHOSPHATE: .53; .5; .37; .037; .03; .012; .00082 INJECTION, SOLUTION INTRAVENOUS at 08:10

## 2020-10-12 RX ADMIN — BUPIVACAINE HYDROCHLORIDE AND EPINEPHRINE BITARTRATE 15 ML: 5; .005 INJECTION, SOLUTION EPIDURAL; INTRACAUDAL; PERINEURAL at 07:10

## 2020-10-12 RX ADMIN — HYDROMORPHONE HYDROCHLORIDE 0.2 MG: 1 INJECTION, SOLUTION INTRAMUSCULAR; INTRAVENOUS; SUBCUTANEOUS at 02:10

## 2020-10-12 RX ADMIN — SUCCINYLCHOLINE CHLORIDE 140 MG: 20 INJECTION, SOLUTION INTRAMUSCULAR; INTRAVENOUS; PARENTERAL at 08:10

## 2020-10-12 RX ADMIN — CEFAZOLIN 2 G: 330 INJECTION, POWDER, FOR SOLUTION INTRAMUSCULAR; INTRAVENOUS at 08:10

## 2020-10-12 RX ADMIN — Medication 10 MG: at 11:10

## 2020-10-12 RX ADMIN — LIDOCAINE HYDROCHLORIDE 60 MG: 20 INJECTION, SOLUTION EPIDURAL; INFILTRATION; INTRACAUDAL; PERINEURAL at 08:10

## 2020-10-12 RX ADMIN — PROPOFOL 150 MG: 10 INJECTION, EMULSION INTRAVENOUS at 08:10

## 2020-10-12 RX ADMIN — PROPOFOL 20 MG: 10 INJECTION, EMULSION INTRAVENOUS at 10:10

## 2020-10-12 RX ADMIN — OXYCODONE HYDROCHLORIDE AND ACETAMINOPHEN 1 TABLET: 5; 325 TABLET ORAL at 01:10

## 2020-10-12 RX ADMIN — ROCURONIUM BROMIDE 5 MG: 10 INJECTION, SOLUTION INTRAVENOUS at 08:10

## 2020-10-12 RX ADMIN — PHENYLEPHRINE HYDROCHLORIDE 50 MCG: 10 INJECTION INTRAVENOUS at 09:10

## 2020-10-12 RX ADMIN — DEXMEDETOMIDINE HYDROCHLORIDE 4 MCG: 100 INJECTION, SOLUTION INTRAVENOUS at 12:10

## 2020-10-12 RX ADMIN — Medication 20 MG: at 08:10

## 2020-10-12 RX ADMIN — FAMOTIDINE 20 MG: 10 INJECTION INTRAVENOUS at 08:10

## 2020-10-12 RX ADMIN — ONDANSETRON 4 MG: 2 INJECTION, SOLUTION INTRAMUSCULAR; INTRAVENOUS at 08:10

## 2020-10-12 RX ADMIN — SODIUM CHLORIDE: 0.9 INJECTION, SOLUTION INTRAVENOUS at 08:10

## 2020-10-12 RX ADMIN — PROPOFOL 30 MG: 10 INJECTION, EMULSION INTRAVENOUS at 10:10

## 2020-10-12 RX ADMIN — HYDROMORPHONE HYDROCHLORIDE 0.2 MG: 1 INJECTION, SOLUTION INTRAMUSCULAR; INTRAVENOUS; SUBCUTANEOUS at 01:10

## 2020-10-12 RX ADMIN — SODIUM CHLORIDE 0.15 MCG/KG/MIN: 9 INJECTION, SOLUTION INTRAVENOUS at 09:10

## 2020-10-12 RX ADMIN — SODIUM CHLORIDE, SODIUM GLUCONATE, SODIUM ACETATE, POTASSIUM CHLORIDE, MAGNESIUM CHLORIDE, SODIUM PHOSPHATE, DIBASIC, AND POTASSIUM PHOSPHATE: .53; .5; .37; .037; .03; .012; .00082 INJECTION, SOLUTION INTRAVENOUS at 09:10

## 2020-10-12 RX ADMIN — ACETAMINOPHEN 1000 MG: 500 TABLET ORAL at 06:10

## 2020-10-12 RX ADMIN — Medication 10 MG: at 09:10

## 2020-10-12 RX ADMIN — ONDANSETRON 4 MG: 2 INJECTION, SOLUTION INTRAMUSCULAR; INTRAVENOUS at 07:10

## 2020-10-12 RX ADMIN — DEXAMETHASONE SODIUM PHOSPHATE 4 MG: 4 INJECTION, SOLUTION INTRAMUSCULAR; INTRAVENOUS at 08:10

## 2020-10-12 NOTE — ANESTHESIA PROCEDURE NOTES
Paravertebral Single Injection Block(s)    Patient location during procedure: pre-op   Block not for primary anesthetic.  Reason for block: at surgeon's request and post-op pain management   Post-op Pain Location: Bilateral breast  Start time: 10/12/2020 7:45 AM  Timeout: 10/12/2020 7:44 AM   End time: 10/12/2020 7:55 AM    Staffing  Authorizing Provider: Neptali Wilks MD  Performing Provider: Ruddy Isabel MD    Preanesthetic Checklist  Completed: patient identified, site marked, surgical consent, pre-op evaluation, timeout performed, IV checked, risks and benefits discussed and monitors and equipment checked  Peripheral Block  Patient position: sitting  Prep: ChloraPrep  Patient monitoring: heart rate, cardiac monitor, continuous pulse ox, continuous capnometry and frequent blood pressure checks  Block type: paravertebral - thoracic  Laterality: bilateral  Injection technique: single shot  Location: T3-4  Needle  Needle type: Tuohy   Needle gauge: 17 G  Needle length: 3.5 in  Needle localization: anatomical landmarks  Needle insertion depth: 3.5 cm     Assessment  Injection assessment: negative aspiration and negative parasthesia  Paresthesia pain: none  Heart rate change: no  Slow fractionated injection: yes  Additional Notes  T3 os at 2.5 cm.  VSS.  [facility] RN monitoring vitals throughout procedure.  Patient tolerated procedure well.  15 mL 0.5% bupivacaine with additives injected on each side, for total of 30 mL.

## 2020-10-12 NOTE — PLAN OF CARE
Discharge instructions reviewed with pt, S/o at this time, denied questions. PIVs removed with catheters intact. Pt drinking coca-cola

## 2020-10-12 NOTE — H&P
Interval H&P  Plastic Surgery    The patient has been examined and the H&P has been reviewed:    I concur with the findings and no changes have occurred since H&P was written.    Surgery risks, benefits and alternative options discussed and understood by patient/family.    Zoraida Hernandez MD  PGY-1, General Surgery  [facility]      Subjective:      Alton Bach is a 39 y.o. year old female who presents to the Plastic Surgery Clinic on 09/09/2020 for consultation regarding breast reconstruction following mastectomy. 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 Patient saw Dr. Huitron, and her options for surgical management were discussed in detail at that visit. She states that she has elected to proceed with bilateral mastectomy with explant of her previous implants, and is here today to discuss reconstructive options. She states that she had textured implants placed in 2006 with Dr. Uriarte. Denies fever, chills, nausea, vomiting, or other systemic signs of infection.         Vitals:     09/09/20 1531   BP: (!) 151/63   Pulse: 82              Review of patient's allergies indicates:   Allergen Reactions    Coconut      Strawberry                  Current Outpatient Medications on File Prior to Visit   Medication Sig Dispense Refill    ibuprofen (ADVIL,MOTRIN) 600 MG tablet Take 1 tablet (600 mg total) by mouth 3 (three) times daily. 30 tablet 0    multivitamin (ONE DAILY MULTIVITAMIN) per tablet Take 1 tablet by mouth once daily.          No current facility-administered medications on file prior to visit.              Patient Active Problem List   Diagnosis    Groin strain               Past Surgical History:   Procedure Laterality Date    breast augmentation        BREAST BIOPSY Right      CHOLECYSTECTOMY             Social History               Socioeconomic History    Marital status: Single        Spouse name: Not on file    Number of children: Not on file    Years of education: Not on file    Highest education level: Not on file   Occupational History    Not on file   Social Needs    Financial resource strain: Not on file    Food insecurity       Worry: Not on file       Inability: Not on file    Transportation needs       Medical: Not on file       Non-medical: Not on file   Tobacco Use    Smoking status: Current Every Day Smoker       Packs/day: 0.50    Smokeless tobacco: Never Used   Substance and Sexual Activity    Alcohol use: No    Drug use: No    Sexual activity: Not on file   Lifestyle    Physical activity       Days per week: Not on file       Minutes per session: Not on file    Stress: Not on file   Relationships    Social connections       Talks on phone: Not on file       Gets together: Not on file       Attends Latter day service: Not on file       Active member of club or organization: Not on file       Attends meetings of clubs or organizations: Not on file       Relationship status: Not on file   Other Topics Concern    Not on file   Social History Narrative    Not on file                 Review of Systems: negative except as otherwise stated     Objective:      Physical Exam:      Vitals:     09/09/20 1531   BP: (!) 151/63   Pulse: 82         WD WN NAD  VSS  Normal resp effort  Bilateral implants in place           Assessment:       Alton Bach is a 39 year old female with a history of recently diagnosed DCIS of the left breast presenting today to discuss reconstructive options after planned bilateral mastectomy     Plan:   - Patient was seen and evaluated by myself and Dr. Haresh Porter    - Per patient, Dr. Huitron is planning for bilateral nipple sparing mastectomy. Discussed with patient options for reconstruction including delayed reconstruction, tissue expanders, and direct implant. Patient expressed understanding and will be scheduled for surgery  -  Risks, benefits and alternatives to surgery were discussed. Will submit for insurance authorization.  - Office staff to coordinate surgery date once insurance authorization obtained        All questions were answered. The patient was advised to call the clinic with any questions or concerns prior to their next visit.

## 2020-10-12 NOTE — ANESTHESIA POSTPROCEDURE EVALUATION
Anesthesia Post Evaluation    Patient: Alton Bach    Procedure(s) Performed: Procedure(s) (LRB):  MASTECTOMY-Bilateral  (Bilateral)  BIOPSY, LYMPH NODE, SENTINEL-Bilateral (Bilateral)  REPLACEMENT, IMPLANT, BREAST-Bilateral (Bilateral)    Final Anesthesia Type: general    Patient location during evaluation: PACU  Patient participation: Yes- Able to Participate  Level of consciousness: awake and alert  Post-procedure vital signs: reviewed and stable  Pain management: adequate  Airway patency: patent    PONV status at discharge: No PONV  Anesthetic complications: no      Cardiovascular status: blood pressure returned to baseline  Respiratory status: unassisted, spontaneous ventilation and room air  Hydration status: euvolemic  Follow-up not needed.          Vitals Value Taken Time   /60 10/12/20 1402   Temp 36.4 °C (97.5 °F) 10/12/20 1345   Pulse 81 10/12/20 1410   Resp 16 10/12/20 1410   SpO2 100 % 10/12/20 1410   Vitals shown include unvalidated device data.      No case tracking events are documented in the log.      Pain/Mehrdad Score: Pain Rating Prior to Med Admin: 7 (10/12/2020  2:00 PM)  Pain Rating Post Med Admin: 0 (10/12/2020  8:05 AM)  Mehrdad Score: 9 (10/12/2020  2:00 PM)

## 2020-10-12 NOTE — TRANSFER OF CARE
"Anesthesia Transfer of Care Note    Patient: Alton Bach    Procedure(s) Performed: Procedure(s) (LRB):  MASTECTOMY-Bilateral  (Bilateral)  BIOPSY, LYMPH NODE, SENTINEL-Bilateral (Bilateral)  REPLACEMENT, IMPLANT, BREAST-Bilateral (Bilateral)    Patient location: PACU    Anesthesia Type: general    Transport from OR: Transported from OR on 6-10 L/min O2 by face mask with adequate spontaneous ventilation    Post pain: adequate analgesia    Post assessment: no apparent anesthetic complications    Post vital signs: stable    Level of consciousness: awake and alert    Nausea/Vomiting: no nausea/vomiting    Complications: none    Transfer of care protocol was followed      Last vitals:   Visit Vitals  /67   Pulse 86   Temp 36.6 °C (97.9 °F) (Temporal)   Resp 16   Ht 5' 7" (1.702 m)   Wt 61.2 kg (135 lb)   LMP 09/28/2020   SpO2 100%   Breastfeeding No   BMI 21.14 kg/m²     "

## 2020-10-12 NOTE — DISCHARGE INSTRUCTIONS
Mastectomy: After Surgery     Remove the plug and measure the fluid as directed.     The length of your hospital stay depends on the type of surgery you have. Youll be given instructions to follow during recovery. Some women feel fine within a month. Others need more time. Take as much time as you need to adjust to the changes in your life and body.  Right after surgery  You will wake up in the recovery room, where you will be closely watched. You may have an IV for medicines and fluids. Once fully awake, you will be taken to your room, where you can have visitors. Expect to be up and walking soon after surgery. You will be given pain medicine if you need it. It's important to get out of bed and move around, even though it may be hard to do. Activity can help get your digestive tract moving, help prevent blood clots, and help get air moving normally through your lungs. Use the pain medicine to be comfortable so you can get up.  Your first look     Once the drain is empty, squeeze the bulb with one hand while using the other hand to replace the plug.     A few days after surgery, your dressing will be removed. Looking at your scar for the first time can be hard. You may feel most at ease taking this step at home. You may want to be alone, or you may want a friend to support you. Either way is OK. At first, try looking down at your body rather than in the mirror. There will probably be some bruising, redness, stiffness, tightness, and/or swelling that will get better as you heal. It may take a few months to know what your chest will really look and feel like after surgery.  At home  You are likely to go home a few days after surgery. Before leaving, you will receive instructions on home care. You may have:  · One or two soft plastic drains. These draw off fluid from around the incision. Be sure to empty your drain at least every 8 hours, or as directed. Remove the plug and empty the contents into the container  provided. Measure the amount of fluid as directed. Write it down to show your healthcare provider. The drainage will decrease as you heal. The drains will be removed at the healthcare provider's office, usually within two weeks of surgery.  · A dressing over your incision. Care for the dressing as directed. Take sponge baths for now to avoid getting it wet.  · Stitches at the incision site. They may dissolve on their own. Or they may be removed at your follow-up visit. Ask your healthcare provider what to expect.   · Pain medicines to help relieve any discomfort. Medicines should be taken as directed.  · An activity/exercise plan. Make sure you know what you can and cannot do. You will be given exercises to help keep normal movement in your shoulder, build your arm and chest muscle strength, and help decrease swelling.  · A follow-up appointment. Find out when you will need to see the healthcare provider next and what to expect as the next part of your treatment.  Know what problems to watch for and when you need to call your healthcare providers. Also be sure you know how to get help after office hours, and on weekends and holidays, too.   Date Last Reviewed: 10/31/2015  © 1035-7702 [employer]. [address] [place] [address]. All rights reserved. This information is not intended as a substitute for professional medical care. Always follow your healthcare professional's instructions.        Discharge Instructions: Caring for Your Wicho-Perez Drainage Tube  Your doctor discharges you with a Wicho-Perez drainage tube. Doctors commonly leave this drain within the abdominal cavity after surgery. It helps drain and collect blood and body fluid after surgery. This can prevent swelling and reduces the risk for infection. The tube is held in place by a few stitches. It is covered with a bandage. Your doctor will remove the drain when he or she determines you no longer need it.  Home  care  · Dont sleep on the same side as the tube.  · Secure the tube and bag inside your clothing with a safety pin. This helps keep the tube from being pulled out.  · Empty your drain at least twice a day. Empty it more often if the drain is full. Wash  and dry your hands before emptying the drain.  ¨ Lift the opening on the drain.  ¨ Drain the fluid into a measuring cup.  ¨ Record the amount of fluid each time you empty the drain. Include the date and time it was emptied. Share this information with your doctor on your next visit.  ¨ Squeeze the bulb with your hands until you hear air coming out of the bulb if your doctor has instructed you to do so (sometimes the bulb is used as a reservoir without suction). Check with your doctor about specific drain instructions.  ¨ Close the opening.  · Change the dressing around the tube every day.  ¨ Wash your hands.  ¨ Remove the old bandage.  ¨ Wash your hands again.  ¨ Wet a cotton swab and clean the skin around the incision and tube site. Use normal saline solution (salt and water). Or, you can use warm, soapy water.  ¨ Put a new bandage on the incision and tube site. Make the bandage large enough to cover the whole incision area.  ¨ Tape the bandage in place.  · Keep the bandage and tube site dry when you shower. Ask your healthcare provider about the best way to do this.  · Stripping the tube helps keep blood clots from blocking the tube. Ask your nurse how often you should strip the tube. Stripping may not be needed, depending on where and why your doctor placed the tube. It may even be dangerous in some cases.   ¨ Hold the tubing where it leaves the skin, with one hand. This keeps it from pulling on the skin.  ¨ Pinch the tubing with the thumb and first finger of your other hand.  ¨ Slowly and firmly pull your thumb and first finger down the tubing. You may find it helpful to hold an alcohol swab between your fingers and the tube to lubricate the tubing.  ¨ If the  pulling hurts or feels like its coming out of the skin, stop. Begin again more gently.  Follow-up care  Make a follow-up appointment as directed by our staff.     When to seek medical care  Call your healthcare provider right away if you have any of the following:  · New or increased pain around the tube  · Redness, swelling, or warmth around the incision or tube  · Drainage that is foul-smelling  · Vomiting  · Fever of 100.4°F (38°C)  · Fluid leaking around the tube  · Incision seems not to be healing  · Stitches become loose  · Tube falls out or breaks  · Drainage that changes from light pink to dark red  · Blood clots in the drainage bulb  · A sudden increase or decrease in the amount of drainage (over 30 mL)   Date Last Reviewed: 2/1/2017  © 6001-0351 [employer]. [address] [place] [address], [place] [address]. All rights reserved. This information is not intended as a substitute for professional medical care. Always follow your healthcare professional's instructions.

## 2020-10-12 NOTE — ANESTHESIA PROCEDURE NOTES
Intubation  Performed by: Neal Ventura MD  Authorized by: Park Shane MD     Intubation:     Induction:  Rapid sequence induction (pt with severe nausea currently)    Intubated:  Postinduction    Mask Ventilation:  N/a    Attempts:  1    Attempted By:  Resident anesthesiologist    Method of Intubation:  Direct    Blade:  Jennings 2    Laryngeal View Grade: Grade I - full view of chords      Difficult Airway Encountered?: No      Complications:  None    Airway Device:  Oral endotracheal tube    Airway Device Size:  7.0    Style/Cuff Inflation:  Cuffed (inflated to minimal occlusive pressure)    Tube secured:  21    Secured at:  The lips    Placement Verified By:  Capnometry and Revisualization with laryngoscopy    Complicating Factors:  Small mouth    Findings Post-Intubation:  BS equal bilateral and atraumatic/condition of teeth unchanged

## 2020-10-14 ENCOUNTER — PATIENT MESSAGE (OUTPATIENT)
Dept: SURGERY | Facility: CLINIC | Age: 40
End: 2020-10-14

## 2020-10-14 ENCOUNTER — NURSE TRIAGE (OUTPATIENT)
Dept: ADMINISTRATIVE | Facility: CLINIC | Age: 40
End: 2020-10-14

## 2020-10-14 ENCOUNTER — PATIENT MESSAGE (OUTPATIENT)
Dept: ADMINISTRATIVE | Facility: OTHER | Age: 40
End: 2020-10-14

## 2020-10-14 NOTE — OP NOTE
Operative Note     10/12/2020    PRE-OP DIAGNOSIS: Ductal carcinoma in situ (DCIS) of left breast [D05.12]      POST-OP DIAGNOSIS: Post-Op Diagnosis Codes:     * Ductal carcinoma in situ (DCIS) of left breast [D05.12]    Procedure(s):  MASTECTOMY-Bilateral   BIOPSY, LYMPH NODE, SENTINEL-Bilateral  REPLACEMENT, IMPLANT, BREAST-Bilateral     SURGEON: Surgeon(s) and Role:  Panel 1:     * Anahi Huitron MD - Primary  Panel 2:     * Haresh Porter MD - Primary     * Zoraida Hernandez MD - Resident - Assisting     * Alvarado Palacios MD - Fellow    ANESTHESIA: General     OPERATIVE FINDINGS: 2 SLN right  Counts 955, 257. Both negative.  2 SLN left counts 317, 901. Both negative.    INDICATION FOR PROCEDURE: This patient presents with a history of DCIS of the left breast    PROCEDURE IN DETAIL:  Alton Bach is a 39 y.o. female brought to the operating room for definitive surgery of DCIS of the left breast.  The patient has elected to undergo bilateral simple mastectomy with sentinel lymph node biopsy for rosamaria assessment. The patient was informed of the possible risks and complications of the procedure, including but not limited to anesthetic risks, bleeding, infection, and need for additional surgery.  The patient concurred with the proposed plan, and has given informed consent.  The site of surgery was properly noted/marked in the preoperative holding area.    Prior to arriving in the operating room, the patient's bilateral breast was injected with technetium to facilitate sentinel lymph node identification. The patient was then brought to the operating room and placed in the supine position with both upper extremities extended.  general anesthesia was administered. Perioperative antibiotics were administered consisting of Ancef and a time out was performed confirming the patient, site, and procedure.  The bilateral chest and axilla was then prepped and draped in the usual sterile  fashion.    We first turned our attention to the right prophylactic breast where an elliptical incision was extended from the axillary incision to incorporate the nipple areolar complex.  The incision was made with a 10-blade and deepened through the subcutaneous tissues with Bovie electrocautery.  Skin flaps were raised to the clavicle superiorly, to the lateral border of the sternum medially, to the inframammary fold inferiorly, and to the anterior border of the latissimus dorsi muscle laterally. The breast tissue was sharply excised off the chest wall taking care to incorporate the pectoralis fascia while leaving the serratus fascia behind.  The resulting mastectomy specimen was marked using a short stitch superiorly and a long stitch laterally.  The breast was sent to pathology for permanent evaluation.  We then  turned our attention to the right axilla. The gamma probe was used to identify an area of increased radioactivity within the lower axilla. The clavipectoral sheath was sharply incised to reveal the level I axillary lymph nodes. The probe was used to identify a single node with increased radioactivity.  This node was brought into the operative field and carefully dissected free of the surrounding lymphovascular structures.  The highest ex vivo count of the node was 955.  The node was then sent to pathology for frozen section evaluation, labeled as sentinel node #1.  A total of 2 axillary sentinel nodes and 0 axillary non-sentinel nodes were identified, excised and submitted to pathology.  Bed counts were obtained to confirm that the 10% rule had not been violated.   The wound was irrigated with normal saline, and all bleeding points were secured with Bovie electrocautery.    The operative field was irrigated with normal saline and all bleeding points were secured with Bovie electrocautery.  The incision was closed by the plastic surgery service.      We then turned our attention to the left breast where  an elliptical incision was fashioned to incorporate the nipple areolar complex.  The incision was made with a 10-blade and extended through the subcutaneous tissues with Bovie electrocautery.  Skin flaps were raised to the clavicle superiorly.  We then  turned our attention to the left axilla. The gamma probe was used to identify an area of increased radioactivity within the lower axilla. The clavipectoral sheath was sharply incised to reveal the level I axillary lymph nodes. The probe was used to identify a single node with increased radioactivity.  This node was brought into the operative field and carefully dissected free of the surrounding lymphovascular structures.  The highest ex vivo count of the node was 901.  The node was then sent to pathology for frozen section evaluation, labeled as sentinel node #1.  A total of 2 axillary sentinel nodes and 0 axillary non-sentinel nodes were identified, excised and submitted to pathology.  Bed counts were obtained to confirm that the 10% rule had not been violated.   The wound was irrigated with normal saline, and all bleeding points were secured with Bovie electrocautery.  We then proceeded to raise the remainder of the flaps to the lateral border of the sternum medially, to the inframammary fold inferiorly, and to the anterior border of the latissimus dorsi muscle laterally. The breast tissue was sharply excised off the chest wall taking care to incorporate the pectoralis fascia while leaving the serratus fascia behind.  The resulting mastectomy specimen was marked using a short stitch superiorly and long stitch laterally.  The breast was sent to pathology for permanent evaluation.      Frozen section rosamaria evaluation revealed no evidence of metastatic disease.  Therefore, the operative field was irrigated with normal saline and all bleeding points were secured with Bovie electrocautery.  The incision was closed by the plastic surgery service.        ESTIMATED BLOOD  LOSS: Minimal    COMPLICATIONS: none    DISPOSITION: intraop transfer to plastics    ATTESTATION:   I performed the procedure.

## 2020-10-14 NOTE — TELEPHONE ENCOUNTER
Pt had breast surgery on 10/12/20. Pt sent home on Clindamycin and Percocet. Pt states she started getting itchy today and noticed welts to her chest, back of neck, back and both arms. Last took antibiotic at 0900 this morning. Pt denies coughing, hoarseness, facial swelling, oral swelling or difficulty breathing. Pt advised to take antihistamine and stop Clindamycin. Pt advised to reach out to surgeon in AM.    Reason for Disposition   [1] Widespread hives, itching or facial swelling AND [2] onset > 2 hours after exposure to high-risk allergen (e.g., sting, nuts, 1st dose of antibiotic)    Additional Information   Negative: [1] Life-threatening reaction (anaphylaxis) in the past to similar substance (e.g., food, insect bite/sting, chemical, etc.) AND [2] < 2 hours since exposure   Negative: Difficulty breathing or wheezing now   Negative: [1] Swollen tongue AND [2] rapid onset   Negative: [1] Hoarseness or cough now AND [2] rapid onset   Negative: Difficult to awaken or acting confused (e.g., disoriented, slurred speech)   Negative: Shock suspected (e.g., cold/pale/clammy skin, too weak to stand, low BP, rapid pulse)   Negative: Sounds like a life-threatening emergency to the triager   Negative: Swollen tongue   Negative: [1] Widespread hives, itching or facial swelling AND [2] onset < 2 hours of exposure to high-risk allergen (e.g., sting, nuts, 1st dose of antibiotic)   Negative: Patient sounds very sick or weak to the triager   Negative: [1] MODERATE-SEVERE hives persist (i.e., hives interfere with normal activities or work) AND [2] taking antihistamine (e.g., Benadryl, Claritin) > 24 hours   Negative: [1] Hives have become worse AND [2] taking oral steroids (e.g., prednisone) > 24 hours   Negative: Joint swelling   Negative: [1] Abdominal pain AND [2] pain present > 12 hours   Negative: Fever    [facility]

## 2020-10-14 NOTE — OP NOTE
Date of surgery on 12/29   Preoperative diagnosis breast cancer  Postoperative diagnosis is same  Procedure performed: immediate bilateral breast reconstruction using implants  Placement of AlloDerm a left breast  Placement of AlloDerm right breast  Surgeon German  Anesthesia general  Complications none  Drains times for  Blood loss minimal    After completion of mastectomy entered the room.  This patient had previous implants and the general surgeon left the capsules intact.  These implants were submuscular.  The pectoralis major muscle was then divided.  The pocket was entered.  Previous implants were removed.  Next, the chest wall was then re-prepped and draped, gloves were changed.  Pockets were irrigated with Betadine followed by triple antibiotic solution.  Next, 2 smooth silicone implants were opened on the back table soaked in triple antibiotic solution.  They were placed in the pockets.  We were unable to close the pockets directly due to tension.  Thus 2 pieces of AlloDerm were then used as patches and sutured to each edge of the muscle using running 2-0 Vicryl sutures.  Next, drains were placed skin was then closed using interrupted 3-0 Monocryl followed by running 4-0 Monocryl subcuticular suture.  There were no complications similar procedure was performed on the opposite side.

## 2020-10-15 ENCOUNTER — PATIENT MESSAGE (OUTPATIENT)
Dept: SURGERY | Facility: CLINIC | Age: 40
End: 2020-10-15

## 2020-10-15 ENCOUNTER — TELEPHONE (OUTPATIENT)
Dept: PLASTIC SURGERY | Facility: CLINIC | Age: 40
End: 2020-10-15

## 2020-10-15 RX ORDER — SULFAMETHOXAZOLE AND TRIMETHOPRIM 800; 160 MG/1; MG/1
1 TABLET ORAL 2 TIMES DAILY
Qty: 14 TABLET | Refills: 0 | Status: SHIPPED | OUTPATIENT
Start: 2020-10-15 | End: 2020-10-22

## 2020-10-17 ENCOUNTER — PATIENT MESSAGE (OUTPATIENT)
Dept: PLASTIC SURGERY | Facility: CLINIC | Age: 40
End: 2020-10-17

## 2020-10-19 ENCOUNTER — PATIENT MESSAGE (OUTPATIENT)
Dept: PLASTIC SURGERY | Facility: CLINIC | Age: 40
End: 2020-10-19

## 2020-10-19 RX ORDER — METHYLPREDNISOLONE 4 MG/1
TABLET ORAL
Qty: 1 PACKAGE | Refills: 0 | Status: SHIPPED | OUTPATIENT
Start: 2020-10-19 | End: 2020-11-09

## 2020-10-19 NOTE — PROGRESS NOTES
Medrol dosepak sent to pharmacy secondary to systemic rash likely from Clindamycin that hasn't resolved with medication d/c Friday and oral Benadryl/Pepcid

## 2020-10-21 ENCOUNTER — OFFICE VISIT (OUTPATIENT)
Dept: PLASTIC SURGERY | Facility: CLINIC | Age: 40
End: 2020-10-21
Payer: COMMERCIAL

## 2020-10-21 VITALS
DIASTOLIC BLOOD PRESSURE: 77 MMHG | SYSTOLIC BLOOD PRESSURE: 139 MMHG | WEIGHT: 135 LBS | HEART RATE: 84 BPM | BODY MASS INDEX: 21.14 KG/M2

## 2020-10-21 DIAGNOSIS — Z85.3 PERSONAL HISTORY OF BREAST CANCER: Primary | ICD-10-CM

## 2020-10-21 PROCEDURE — 99024 POSTOP FOLLOW-UP VISIT: CPT | Mod: S$GLB,,, | Performed by: SURGERY

## 2020-10-21 PROCEDURE — 99999 PR PBB SHADOW E&M-EST. PATIENT-LVL IV: ICD-10-PCS | Mod: PBBFAC,,, | Performed by: SURGERY

## 2020-10-21 PROCEDURE — 99999 PR PBB SHADOW E&M-EST. PATIENT-LVL IV: CPT | Mod: PBBFAC,,, | Performed by: SURGERY

## 2020-10-21 PROCEDURE — 99024 PR POST-OP FOLLOW-UP VISIT: ICD-10-PCS | Mod: S$GLB,,, | Performed by: SURGERY

## 2020-10-21 NOTE — PROGRESS NOTES
Subjective:      Alton Bach is a 40 y.o. year old female who presents to the Plastic Surgery Clinic on 10/21/2020 s/p bilateral mastectomy with Direct to implant reconstruction and prevena wound vac placement. Patient denies fever, chills, nausea, vomiting, or other systemic signs of infection. She reports taking her postop surgical bra off and wrapping herself in tight coband and ace bandage around her prevena.    Vitals:    10/21/20 1434   BP: 139/77   Pulse: 84        Review of patient's allergies indicates:   Allergen Reactions    Coconut Swelling and Rash    Dye Swelling and Blisters     Hair Dye    Strawberry Swelling and Rash       Current Outpatient Medications on File Prior to Visit   Medication Sig Dispense Refill    ibuprofen (ADVIL,MOTRIN) 600 MG tablet Take 1 tablet (600 mg total) by mouth 3 (three) times daily. 30 tablet 0    methylPREDNISolone (MEDROL DOSEPACK) 4 mg tablet use as directed 1 Package 0    multivitamin (ONE DAILY MULTIVITAMIN) per tablet Take 1 tablet by mouth once daily.      oxyCODONE-acetaminophen (PERCOCET) 5-325 mg per tablet Take 1 tablet by mouth every 6 (six) hours as needed for Pain. 43 tablet 0    sulfamethoxazole-trimethoprim 800-160mg (BACTRIM DS) 800-160 mg Tab Take 1 tablet by mouth 2 (two) times daily. for 7 days 14 tablet 0     No current facility-administered medications on file prior to visit.        Patient Active Problem List   Diagnosis    Groin strain    Ductal carcinoma in situ (DCIS) of left breast       Past Surgical History:   Procedure Laterality Date    breast augmentation      BREAST BIOPSY Right     CHOLECYSTECTOMY      MASTECTOMY Bilateral 10/12/2020    Procedure: MASTECTOMY-Bilateral ;  Surgeon: Anahi Huitron MD;  Location: [facility] OR [address] [place] [address];  Service: General;  Laterality: Bilateral;    REPLACEMENT OF IMPLANT OF BREAST Bilateral 10/12/2020    Procedure: REPLACEMENT, IMPLANT, BREAST-Bilateral;  Surgeon: Haresh Porter MD;   Location: [facility] OR [address] [place] [address];  Service: Plastics;  Laterality: Bilateral;    SENTINEL LYMPH NODE BIOPSY Bilateral 10/12/2020    Procedure: BIOPSY, LYMPH NODE, SENTINEL-Bilateral;  Surgeon: Anahi Huitron MD;  Location: [facility] OR [address] [place] [address];  Service: General;  Laterality: Bilateral;       Social History     Socioeconomic History    Marital status: Single     Spouse name: Not on file    Number of children: Not on file    Years of education: Not on file    Highest education level: Not on file   Occupational History    Not on file   Social Needs    Financial resource strain: Not on file    Food insecurity     Worry: Not on file     Inability: Not on file    Transportation needs     Medical: Not on file     Non-medical: Not on file   Tobacco Use    Smoking status: Current Every Day Smoker     Packs/day: 0.50    Smokeless tobacco: Never Used   Substance and Sexual Activity    Alcohol use: No    Drug use: No    Sexual activity: Not on file   Lifestyle    Physical activity     Days per week: Not on file     Minutes per session: Not on file    Stress: Not on file   Relationships    Social connections     Talks on phone: Not on file     Gets together: Not on file     Attends Denominational service: Not on file     Active member of club or organization: Not on file     Attends meetings of clubs or organizations: Not on file     Relationship status: Not on file   Other Topics Concern    Not on file   Social History Narrative    Not on file           Review of Systems: negative except as otherwise stated    Objective:     Physical Exam:  Vitals:    10/21/20 1434   BP: 139/77   Pulse: 84       WD WN NAD  VSS  Normal resp effort  S/p bilateral breast implants, 4 RUBENS drains present. Left breast with two areas of nonhealing/pressure wound. Right breast healing well.        Assessment:       Alton Bach is a 39 year old female with s/p bilateral mastectomy with direct to implant breast reconstruction    Plan:   - Patient  presented with tight coband and ace bandage wrap around her chest  - Upon removal of wrap, and prevena, she has marginal skin perfusion to left breast skin  - Two RUBENS remained, one from each surgical site  - Apply bacitracin and nonstick dressing 4-5 times daily  - Monitor skin perfusion to left breast, high risk of skin loss. Will attempt salvage with bacitracin ointment      All questions were answered. The patient was advised to call the clinic with any questions or concerns prior to their next visit.

## 2020-10-22 ENCOUNTER — TELEPHONE (OUTPATIENT)
Dept: PLASTIC SURGERY | Facility: CLINIC | Age: 40
End: 2020-10-22

## 2020-10-22 DIAGNOSIS — Z01.818 PRE-OP TESTING: Primary | ICD-10-CM

## 2020-10-22 LAB
FINAL PATHOLOGIC DIAGNOSIS: NORMAL
FROZEN SECTION DIAGNOSIS: NORMAL
GROSS: NORMAL
Lab: NORMAL

## 2020-10-22 NOTE — DISCHARGE SUMMARY
[facility]  Critical Care - Surgery  Discharge Summary      Patient Name: Alton Bach  MRN: [id number]  Admission Date: 10/12/2020  Hospital Length of Stay: 0 days  Discharge Date and Time: 10/12/2020  5:02 PM  Attending Physician: Beatriz att. providers found   Discharging Provider: Zoraida Hernandez MD  Primary Care Provider: Primary Doctor Beatriz    HPI:  Alton Bach is a 39 y.o. year old female who presents to the Plastic Surgery Clinic on 09/09/2020 for consultation regarding breast reconstruction following mastectomy. 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 Patient saw Dr. Huitron, and her options for surgical management were discussed in detail at that visit. She states that she has elected to proceed with bilateral mastectomy with explant of her previous implants, and is here today to discuss reconstructive options. She states that she had textured implants placed in 2006 with Dr. Uriarte. Denies fever, chills, nausea, vomiting, or other systemic signs of infection.       Procedure(s) (LRB):  MASTECTOMY-Bilateral  (Bilateral)  BIOPSY, LYMPH NODE, SENTINEL-Bilateral (Bilateral)  REPLACEMENT, IMPLANT, BREAST-Bilateral (Bilateral)    Indwelling Lines/Drains at Time of Discharge:   Lines/Drains/Airways     Drain                 Closed/Suction Drain 10/12/20 1125 Lateral;Right;Distal Breast Bulb 15 Fr. 10 days         Closed/Suction Drain 10/12/20 1130 Right;Lateral;Proximal Breast Bulb 15 Fr. 10 days         Closed/Suction Drain 10/12/20 1220 Lateral;Left;Distal Breast Bulb 15 Fr. 10 days         Closed/Suction Drain 10/12/20 1225 Lateral;Left;Proximal Breast Bulb 15 Fr. 10 days                Hospital Course:  Mrs. Isreal presented to [facility] 10/12 for planned bilateral mastectomy with reconstruction. She had two implants placed with provena wound vac placement over surgical wounds. She did  well immediately post-operatively and was discharged home the same day with wound care instructions and close follow up scheduled.         Significant Labs:  None    Significant Imaging:  I have reviewed all pertinent imaging results/findings within the past 24 hours.    Pending Diagnostic Studies:     None        Final Active Diagnoses:    Diagnosis Date Noted POA    PRINCIPAL PROBLEM:  Ductal carcinoma in situ (DCIS) of left breast [D05.12] 10/12/2020 Yes      Problems Resolved During this Admission:       Discharged Condition: good    Disposition: Home or Self Care    Follow Up:    Patient Instructions:      Diet general     Sponge bath only until clinic visit     Lifting restrictions   Order Comments: Limit weight to arms for 4 weeks     Teach RUBENS drain care and provide sheet to record output     Wear Surgical Bra and/or Girdle at all times (may remove for short times to shower)   Order Comments: Wear Surgical Bra and/or Girdle at all times (may remove for short times to shower)     Call MD for:  temperature >100.4     Call MD for:  persistent nausea and vomiting     Call MD for:  persistent dizziness or light-headedness     Call MD for:  redness, tenderness, or signs of infection (pain, swelling, redness, odor or green/yellow discharge around incision site)     Call MD for:  severe uncontrolled pain     Call MD for:  difficulty breathing, headache or visual disturbances     Call MD for:  extreme fatigue     Call MD for:  hives     Medications:  Reconciled Home Medications:      Medication List      START taking these medications    oxyCODONE-acetaminophen 5-325 mg per tablet  Commonly known as: PERCOCET  Take 1 tablet by mouth every 6 (six) hours as needed for Pain.        CONTINUE taking these medications    ibuprofen 600 MG tablet  Commonly known as: ADVIL,MOTRIN  Take 1 tablet (600 mg total) by mouth 3 (three) times daily.     ONE DAILY MULTIVITAMIN per tablet  Generic drug: multivitamin  Take 1 tablet by  mouth once daily.        ASK your doctor about these medications    clindamycin 300 MG capsule  Commonly known as: CLEOCIN  Take 1 capsule (300 mg total) by mouth every 8 (eight) hours. for 5 days  Ask about: Should I take this medication?            Zoraida Hernandez MD  Critical Care - Surgery  [facility]

## 2020-10-22 NOTE — HPI
Alton Bach is a 39 y.o. year old female who presents to the Plastic Surgery Clinic on 09/09/2020 for consultation regarding breast reconstruction following mastectomy. 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 Patient saw Dr. Huitron, and her options for surgical management were discussed in detail at that visit. She states that she has elected to proceed with bilateral mastectomy with explant of her previous implants, and is here today to discuss reconstructive options. She states that she had textured implants placed in 2006 with Dr. Uriarte. Denies fever, chills, nausea, vomiting, or other systemic signs of infection.

## 2020-10-22 NOTE — HOSPITAL COURSE
Mrs. Bach presented to [facility] 10/12 for planned bilateral mastectomy with reconstruction. She had two implants placed with provena wound vac placement over surgical wounds. She did well immediately post-operatively and was discharged home the same day with wound care instructions and close follow up scheduled.

## 2020-10-23 ENCOUNTER — PATIENT MESSAGE (OUTPATIENT)
Dept: SURGERY | Facility: CLINIC | Age: 40
End: 2020-10-23

## 2020-10-25 ENCOUNTER — PATIENT MESSAGE (OUTPATIENT)
Dept: ADMINISTRATIVE | Facility: OTHER | Age: 40
End: 2020-10-25

## 2020-10-26 ENCOUNTER — OFFICE VISIT (OUTPATIENT)
Dept: PLASTIC SURGERY | Facility: CLINIC | Age: 40
End: 2020-10-26
Payer: COMMERCIAL

## 2020-10-26 VITALS — TEMPERATURE: 97 F | WEIGHT: 134.94 LBS | HEIGHT: 67 IN | BODY MASS INDEX: 21.18 KG/M2

## 2020-10-26 DIAGNOSIS — Z09 SURGERY FOLLOW-UP EXAMINATION: Primary | ICD-10-CM

## 2020-10-26 PROBLEM — C50.919 BREAST CANCER: Status: ACTIVE | Noted: 2020-10-26

## 2020-10-26 PROCEDURE — 99024 PR POST-OP FOLLOW-UP VISIT: ICD-10-PCS | Mod: S$GLB,,, | Performed by: SURGERY

## 2020-10-26 PROCEDURE — 99024 POSTOP FOLLOW-UP VISIT: CPT | Mod: S$GLB,,, | Performed by: SURGERY

## 2020-10-26 PROCEDURE — 99999 PR PBB SHADOW E&M-EST. PATIENT-LVL III: ICD-10-PCS | Mod: PBBFAC,,, | Performed by: SURGERY

## 2020-10-26 PROCEDURE — 99999 PR PBB SHADOW E&M-EST. PATIENT-LVL III: CPT | Mod: PBBFAC,,, | Performed by: SURGERY

## 2020-10-27 NOTE — PROGRESS NOTES
Patient presents to Plastic surgery Clinic after having an immediate bilateral breast reconstruction using implants.  Unfortunately, the patient D Mary Alice body and Coban as well as an Ace wrap entrapped both the incisional wound VAC tubes across her breast which resulted in some skin necrosis.  She is much improved today although she has 2 5 mm areas just have to watch very closely the patient is continuing to smoke which makes matters worse.  She has been counseled multiple times regarding this.  She will return to clinic on Wednesday.

## 2020-10-28 ENCOUNTER — OFFICE VISIT (OUTPATIENT)
Dept: PLASTIC SURGERY | Facility: CLINIC | Age: 40
End: 2020-10-28
Payer: COMMERCIAL

## 2020-10-28 VITALS
SYSTOLIC BLOOD PRESSURE: 140 MMHG | WEIGHT: 134 LBS | BODY MASS INDEX: 20.99 KG/M2 | DIASTOLIC BLOOD PRESSURE: 80 MMHG | HEART RATE: 66 BPM

## 2020-10-28 DIAGNOSIS — Z09 SURGERY FOLLOW-UP EXAMINATION: Primary | ICD-10-CM

## 2020-10-28 PROCEDURE — 99024 PR POST-OP FOLLOW-UP VISIT: ICD-10-PCS | Mod: S$GLB,,, | Performed by: SURGERY

## 2020-10-28 PROCEDURE — 99999 PR PBB SHADOW E&M-EST. PATIENT-LVL III: ICD-10-PCS | Mod: PBBFAC,,, | Performed by: SURGERY

## 2020-10-28 PROCEDURE — 99024 POSTOP FOLLOW-UP VISIT: CPT | Mod: S$GLB,,, | Performed by: SURGERY

## 2020-10-28 PROCEDURE — 99999 PR PBB SHADOW E&M-EST. PATIENT-LVL III: CPT | Mod: PBBFAC,,, | Performed by: SURGERY

## 2020-10-28 NOTE — PROGRESS NOTES
Patient presents to Plastic surgery Clinic after undergoing bilateral breast reconstruction direct implant.  The patient has been followed for 2 small areas on 1 breast which are approximately a half a cm by half a cm which are questionable.  The small area on the left appears to be healing.  The slightly larger area in the center along the vertical incision is slightly improved.  We will see her again Monday morning and determine whether she needs to have her direct implant change to a tissue expander.

## 2020-11-02 ENCOUNTER — OFFICE VISIT (OUTPATIENT)
Dept: PLASTIC SURGERY | Facility: CLINIC | Age: 40
End: 2020-11-02
Payer: COMMERCIAL

## 2020-11-02 ENCOUNTER — OFFICE VISIT (OUTPATIENT)
Dept: SURGERY | Facility: CLINIC | Age: 40
End: 2020-11-02
Payer: COMMERCIAL

## 2020-11-02 VITALS
BODY MASS INDEX: 21.03 KG/M2 | WEIGHT: 134 LBS | DIASTOLIC BLOOD PRESSURE: 70 MMHG | TEMPERATURE: 98 F | SYSTOLIC BLOOD PRESSURE: 121 MMHG | HEIGHT: 67 IN | HEART RATE: 98 BPM

## 2020-11-02 DIAGNOSIS — Z09 SURGERY FOLLOW-UP EXAMINATION: Primary | ICD-10-CM

## 2020-11-02 DIAGNOSIS — Z90.13 STATUS POST MASTECTOMY, BILATERAL: Primary | ICD-10-CM

## 2020-11-02 PROCEDURE — 99024 PR POST-OP FOLLOW-UP VISIT: ICD-10-PCS | Mod: S$GLB,,, | Performed by: SURGERY

## 2020-11-02 PROCEDURE — 99024 POSTOP FOLLOW-UP VISIT: CPT | Mod: S$GLB,,, | Performed by: SURGERY

## 2020-11-02 PROCEDURE — 99999 PR PBB SHADOW E&M-EST. PATIENT-LVL III: CPT | Mod: PBBFAC,,, | Performed by: SURGERY

## 2020-11-02 PROCEDURE — 99999 PR PBB SHADOW E&M-EST. PATIENT-LVL I: CPT | Mod: PBBFAC,,, | Performed by: SURGERY

## 2020-11-02 PROCEDURE — 99999 PR PBB SHADOW E&M-EST. PATIENT-LVL III: ICD-10-PCS | Mod: PBBFAC,,, | Performed by: SURGERY

## 2020-11-02 PROCEDURE — 99999 PR PBB SHADOW E&M-EST. PATIENT-LVL I: ICD-10-PCS | Mod: PBBFAC,,, | Performed by: SURGERY

## 2020-11-02 NOTE — PROGRESS NOTES
[facility]  11/2/20    DIAGNOSIS:    This is a 40 y.o. female with a stage pTis N0 M0 intermediate grade ER + OR + Ductal Carcinoma In Situ of the left breast.    TREATMENT SUMMARY:  The patient is status post bilateral total mastectomy and sentinel node biopsy with  on 10/12/2020 with implant reconstruction.   Final pathology showed a 2.1 cm focus of Ductal Carcinoma In Situ in the Left Breast. No evidence of malignancy in the Right breast. 0/4 sentinel lymph nodes with evidence of carcinoma. Full pathology report below.    1. RIGHT AXILLARY SENTINEL LYMPH NODE #1, EXCISION:  One lymph node negative for malignancy (0/1) by routine staining and epithelial immunostain panel  (AE1/AE3, WSK, CAM 5.2).  2. RIGHT AXILLARY SENTINEL LYMPH NODE #2, EXCISION:  One lymph node negative for malignancy (0/1) by routine staining and epithelial immunostain panel  (AE1/AE3, WSK, CAM 5.2).  3. LEFT AXILLARY SENTINEL LYMPH NODE #1, EXCISION:  One lymph node negative for malignancy (0/1) by routine staining and epithelial immunostain panel  (AE1/AE3, WSK, CAM 5.2).  4. LEFT AXILLARY SENTINEL LYMPH NODE #2, EXCISION:   One lymph node negative for malignancy (0/1) by routine staining and epithelial immunostain panel  (AE1/AE3, WSK, CAM 5.2).  5. RIGHT BREAST, MASTECTOMY:  Benign breast tissue with myoepithelial rich complex sclerosing lesion.  Separate hyalinized fibroadenomas (2) and complex fibroadenoma (1).  Background breast tissue with fibrocystic changes.  Unremarkable skin and nipple.  Negative for atypia or malignancy.  Comment: The most dominant lesion present which was previously biopsied is an ill-defined mass  consisting of sclerosing myoepithelial and ductal elements radiating around a central scar. Sclerotic  elements reveal intact myoepithelial cells by P63 staining. This supports the absence of invasive  Carcinoma.  6. LEFT BREAST, MASTECTOMY:  Ductal carcinoma in situ (DCIS), intermediate nuclear grade, solid  type with sclerotic features and focal  comedonecrosis (2.1 cm focus).  Surgical margins are negative for DCIS.  Separate complex fibroadenoma with sclerosing adenosis (1) and hyalinized fibroadenomas (2).  Microcalcifications are identified in association with DCIS and fibroadenomas.  Skin and nipple negative for in situ carcinoma.  No invasive carcinoma is identified.  Comment: A 2.1 cm focus of DCIS which was previously biopsied is identified and shows abundant  entrapped ductal elements in sclerotic stroma. These sclerotic elements exhibit intact P63 staining indicating  peripheral myoepithelial cells and ruling out invasive carcinoma. The separate masses identified represent  multiple complex and hyalinized fibroadenomas. The associated slcerosing adenosis within the complex  fibroadenoma also exhibits intact myoepithelial cells by P63 staining, but lacks the cytomorphologic features  of sclerotic DCIS.  7. LEFT IMPLANT, REMOVAL:  One breast implant received for gross exam only.  8. RIGHT IMPLANT, REMOVAL:  One breast implant received for gross exam only.    INTERVAL HISTORY  Alton Bach comes in for a post-op check.  She denies fever, chills, chest pain or shortness of breath.  Her pain is well controlled.      PHYSICAL EXAMINATION:   General:  This is a well appearing female with appropriate speech, affect and gait.     Breast:  Incisions at right breast appear clean, dry, and intact. Incision at left breast has mild central necrosis; per patient she is being taken back for a revision with tissue expander placement on Monday.     IMPRESSION:   The patient has had an uneventful postoperative course.    PLAN:   1. return in 6 months for a follow up office visit and breast exam.  2. Mammograms no longer needed.  3. No adjuvant therapy needed.  4. Follow up with plastic surgery for necrosis of tissue at left breast.

## 2020-11-03 NOTE — PROGRESS NOTES
Patient presents Plastic surgery Clinic after having a bilateral direct implant breast reconstruction.  She had some problems wrap and Coban around her breast and putting a surrounded and smoking all this contributed to some skin necrosis.  We have been monitoring this very closely.  She has 1 small area that is still questionable.  I am not sure that this is going to make it.  We will give her wound this week if this does not resolve we will go ahead and remove the implant and place a tissue expander.

## 2020-11-04 ENCOUNTER — HOSPITAL ENCOUNTER (OUTPATIENT)
Facility: HOSPITAL | Age: 40
Discharge: HOME | End: 2020-11-04
Attending: SURGERY | Admitting: SURGERY
Payer: COMMERCIAL

## 2020-11-04 ENCOUNTER — ANESTHESIA EVENT (OUTPATIENT)
Dept: SURGERY | Facility: HOSPITAL | Age: 40
End: 2020-11-04
Payer: COMMERCIAL

## 2020-11-04 ENCOUNTER — ANESTHESIA (OUTPATIENT)
Dept: SURGERY | Facility: HOSPITAL | Age: 40
End: 2020-11-04
Payer: COMMERCIAL

## 2020-11-04 ENCOUNTER — OFFICE VISIT (OUTPATIENT)
Dept: PLASTIC SURGERY | Facility: CLINIC | Age: 40
End: 2020-11-04
Payer: COMMERCIAL

## 2020-11-04 VITALS
TEMPERATURE: 98 F | SYSTOLIC BLOOD PRESSURE: 153 MMHG | OXYGEN SATURATION: 98 % | DIASTOLIC BLOOD PRESSURE: 84 MMHG | HEIGHT: 67 IN | DIASTOLIC BLOOD PRESSURE: 57 MMHG | HEART RATE: 82 BPM | RESPIRATION RATE: 28 BRPM | WEIGHT: 135 LBS | BODY MASS INDEX: 21.19 KG/M2 | HEART RATE: 92 BPM | SYSTOLIC BLOOD PRESSURE: 120 MMHG

## 2020-11-04 DIAGNOSIS — C50.919 BREAST CANCER: Primary | ICD-10-CM

## 2020-11-04 DIAGNOSIS — Z85.3 PERSONAL HISTORY OF BREAST CANCER: Primary | ICD-10-CM

## 2020-11-04 DIAGNOSIS — Z09 SURGERY FOLLOW-UP EXAMINATION: ICD-10-CM

## 2020-11-04 LAB
B-HCG UR QL: NEGATIVE
CTP QC/QA: YES
GRAM STN SPEC: NORMAL
GRAM STN SPEC: NORMAL
SARS-COV-2 RDRP RESP QL NAA+PROBE: NEGATIVE

## 2020-11-04 PROCEDURE — 19357 TISS XPNDR PLMT BRST RCNSTJ: CPT | Mod: 78,59,LT, | Performed by: SURGERY

## 2020-11-04 PROCEDURE — 87070 CULTURE OTHR SPECIMN AEROBIC: CPT

## 2020-11-04 PROCEDURE — 99999 PR PBB SHADOW E&M-EST. PATIENT-LVL III: ICD-10-PCS | Mod: PBBFAC,,, | Performed by: PHYSICIAN ASSISTANT

## 2020-11-04 PROCEDURE — 19357 PR BREAST RECONSTRUC W TISS EXPANDR: ICD-10-PCS | Mod: 78,59,LT, | Performed by: SURGERY

## 2020-11-04 PROCEDURE — 87116 MYCOBACTERIA CULTURE: CPT

## 2020-11-04 PROCEDURE — 87206 SMEAR FLUORESCENT/ACID STAI: CPT

## 2020-11-04 PROCEDURE — 19328 RMVL INTACT BREAST IMPLANT: CPT | Mod: 78,LT,, | Performed by: SURGERY

## 2020-11-04 PROCEDURE — 25000003 PHARM REV CODE 250: Performed by: SURGERY

## 2020-11-04 PROCEDURE — 37000009 HC ANESTHESIA EA ADD 15 MINS: Performed by: SURGERY

## 2020-11-04 PROCEDURE — 63600175 PHARM REV CODE 636 W HCPCS: Performed by: STUDENT IN AN ORGANIZED HEALTH CARE EDUCATION/TRAINING PROGRAM

## 2020-11-04 PROCEDURE — 63600175 PHARM REV CODE 636 W HCPCS: Performed by: NURSE ANESTHETIST, CERTIFIED REGISTERED

## 2020-11-04 PROCEDURE — D9220A PRA ANESTHESIA: ICD-10-PCS | Mod: CRNA,,, | Performed by: STUDENT IN AN ORGANIZED HEALTH CARE EDUCATION/TRAINING PROGRAM

## 2020-11-04 PROCEDURE — 87075 CULTR BACTERIA EXCEPT BLOOD: CPT

## 2020-11-04 PROCEDURE — C1729 CATH, DRAINAGE: HCPCS | Performed by: SURGERY

## 2020-11-04 PROCEDURE — 19370 PR SURGERY OF BREAST CAPSULE: ICD-10-PCS | Mod: 78,51,LT, | Performed by: SURGERY

## 2020-11-04 PROCEDURE — 36000706: Performed by: SURGERY

## 2020-11-04 PROCEDURE — D9220A PRA ANESTHESIA: Mod: ANES,,, | Performed by: ANESTHESIOLOGY

## 2020-11-04 PROCEDURE — U0002 COVID-19 LAB TEST NON-CDC: HCPCS

## 2020-11-04 PROCEDURE — 81025 URINE PREGNANCY TEST: CPT | Performed by: STUDENT IN AN ORGANIZED HEALTH CARE EDUCATION/TRAINING PROGRAM

## 2020-11-04 PROCEDURE — 87205 SMEAR GRAM STAIN: CPT

## 2020-11-04 PROCEDURE — 71000044 HC DOSC ROUTINE RECOVERY FIRST HOUR: Performed by: SURGERY

## 2020-11-04 PROCEDURE — 19328 PR REMOVAL OF BREAST IMPLANT: ICD-10-PCS | Mod: 78,LT,, | Performed by: SURGERY

## 2020-11-04 PROCEDURE — 36000707: Performed by: SURGERY

## 2020-11-04 PROCEDURE — 99999 PR PBB SHADOW E&M-EST. PATIENT-LVL III: CPT | Mod: PBBFAC,,, | Performed by: PHYSICIAN ASSISTANT

## 2020-11-04 PROCEDURE — 99024 PR POST-OP FOLLOW-UP VISIT: ICD-10-PCS | Mod: S$GLB,,, | Performed by: PHYSICIAN ASSISTANT

## 2020-11-04 PROCEDURE — 99024 POSTOP FOLLOW-UP VISIT: CPT | Mod: S$GLB,,, | Performed by: PHYSICIAN ASSISTANT

## 2020-11-04 PROCEDURE — 63600175 PHARM REV CODE 636 W HCPCS: Performed by: SURGERY

## 2020-11-04 PROCEDURE — 25000003 PHARM REV CODE 250: Performed by: STUDENT IN AN ORGANIZED HEALTH CARE EDUCATION/TRAINING PROGRAM

## 2020-11-04 PROCEDURE — D9220A PRA ANESTHESIA: Mod: CRNA,,, | Performed by: STUDENT IN AN ORGANIZED HEALTH CARE EDUCATION/TRAINING PROGRAM

## 2020-11-04 PROCEDURE — 87102 FUNGUS ISOLATION CULTURE: CPT

## 2020-11-04 PROCEDURE — 37000008 HC ANESTHESIA 1ST 15 MINUTES: Performed by: SURGERY

## 2020-11-04 PROCEDURE — 25000003 PHARM REV CODE 250: Performed by: NURSE ANESTHETIST, CERTIFIED REGISTERED

## 2020-11-04 PROCEDURE — 71000015 HC POSTOP RECOV 1ST HR: Performed by: SURGERY

## 2020-11-04 PROCEDURE — D9220A PRA ANESTHESIA: ICD-10-PCS | Mod: ANES,,, | Performed by: ANESTHESIOLOGY

## 2020-11-04 PROCEDURE — 19370 REVJ PERI-IMPLT CAPSULE BRST: CPT | Mod: 78,51,LT, | Performed by: SURGERY

## 2020-11-04 PROCEDURE — C1789 PROSTHESIS, BREAST, IMP: HCPCS | Performed by: SURGERY

## 2020-11-04 DEVICE — EXPANDER NATRELLE FH EP 450CC: Type: IMPLANTABLE DEVICE | Site: BREAST | Status: FUNCTIONAL

## 2020-11-04 RX ORDER — SULFAMETHOXAZOLE AND TRIMETHOPRIM 800; 160 MG/1; MG/1
1 TABLET ORAL 2 TIMES DAILY
Qty: 14 TABLET | Refills: 0 | Status: SHIPPED | OUTPATIENT
Start: 2020-11-04 | End: 2020-11-05

## 2020-11-04 RX ORDER — CIPROFLOXACIN 2 MG/ML
INJECTION, SOLUTION INTRAVENOUS
Status: COMPLETED | OUTPATIENT
Start: 2020-11-04 | End: 2020-11-04

## 2020-11-04 RX ORDER — LIDOCAINE HYDROCHLORIDE 10 MG/ML
1 INJECTION, SOLUTION EPIDURAL; INFILTRATION; INTRACAUDAL; PERINEURAL ONCE
Status: DISCONTINUED | OUTPATIENT
Start: 2020-11-04 | End: 2020-11-04 | Stop reason: HOSPADM

## 2020-11-04 RX ORDER — HYDROCODONE BITARTRATE AND ACETAMINOPHEN 5; 325 MG/1; MG/1
1 TABLET ORAL EVERY 4 HOURS PRN
Qty: 16 TABLET | Refills: 0 | Status: SHIPPED | OUTPATIENT
Start: 2020-11-04 | End: 2021-12-02

## 2020-11-04 RX ORDER — LIDOCAINE HYDROCHLORIDE 20 MG/ML
INJECTION INTRAVENOUS
Status: DISCONTINUED | OUTPATIENT
Start: 2020-11-04 | End: 2020-11-04

## 2020-11-04 RX ORDER — ONDANSETRON 2 MG/ML
INJECTION INTRAMUSCULAR; INTRAVENOUS
Status: DISCONTINUED | OUTPATIENT
Start: 2020-11-04 | End: 2020-11-04

## 2020-11-04 RX ORDER — PROPOFOL 10 MG/ML
VIAL (ML) INTRAVENOUS
Status: DISCONTINUED | OUTPATIENT
Start: 2020-11-04 | End: 2020-11-04

## 2020-11-04 RX ORDER — SODIUM CHLORIDE 0.9 % (FLUSH) 0.9 %
3 SYRINGE (ML) INJECTION
Status: DISCONTINUED | OUTPATIENT
Start: 2020-11-04 | End: 2020-11-04 | Stop reason: HOSPADM

## 2020-11-04 RX ORDER — SODIUM CHLORIDE 9 MG/ML
INJECTION, SOLUTION INTRAVENOUS CONTINUOUS PRN
Status: DISCONTINUED | OUTPATIENT
Start: 2020-11-04 | End: 2020-11-04

## 2020-11-04 RX ORDER — MUPIROCIN 20 MG/G
OINTMENT TOPICAL 2 TIMES DAILY
Status: DISCONTINUED | OUTPATIENT
Start: 2020-11-04 | End: 2020-11-04 | Stop reason: HOSPADM

## 2020-11-04 RX ORDER — DEXAMETHASONE SODIUM PHOSPHATE 4 MG/ML
INJECTION, SOLUTION INTRA-ARTICULAR; INTRALESIONAL; INTRAMUSCULAR; INTRAVENOUS; SOFT TISSUE
Status: DISCONTINUED | OUTPATIENT
Start: 2020-11-04 | End: 2020-11-04

## 2020-11-04 RX ORDER — CEFAZOLIN SODIUM 500 MG/2.2ML
INJECTION, POWDER, FOR SOLUTION INTRAMUSCULAR; INTRAVENOUS
Status: DISCONTINUED | OUTPATIENT
Start: 2020-11-04 | End: 2020-11-04 | Stop reason: HOSPADM

## 2020-11-04 RX ORDER — SODIUM CHLORIDE 0.9 % (FLUSH) 0.9 %
10 SYRINGE (ML) INJECTION
Status: DISCONTINUED | OUTPATIENT
Start: 2020-11-04 | End: 2020-11-04 | Stop reason: HOSPADM

## 2020-11-04 RX ORDER — CEFAZOLIN SODIUM 1 G/3ML
2 INJECTION, POWDER, FOR SOLUTION INTRAMUSCULAR; INTRAVENOUS
Status: COMPLETED | OUTPATIENT
Start: 2020-11-04 | End: 2020-11-04

## 2020-11-04 RX ORDER — HYDROCODONE BITARTRATE AND ACETAMINOPHEN 5; 325 MG/1; MG/1
1 TABLET ORAL EVERY 4 HOURS PRN
Status: DISCONTINUED | OUTPATIENT
Start: 2020-11-04 | End: 2020-11-04 | Stop reason: HOSPADM

## 2020-11-04 RX ORDER — ONDANSETRON 2 MG/ML
4 INJECTION INTRAMUSCULAR; INTRAVENOUS EVERY 12 HOURS PRN
Status: DISCONTINUED | OUTPATIENT
Start: 2020-11-04 | End: 2020-11-04 | Stop reason: HOSPADM

## 2020-11-04 RX ORDER — MIDAZOLAM HYDROCHLORIDE 1 MG/ML
INJECTION, SOLUTION INTRAMUSCULAR; INTRAVENOUS
Status: DISCONTINUED | OUTPATIENT
Start: 2020-11-04 | End: 2020-11-04

## 2020-11-04 RX ORDER — HYDROMORPHONE HYDROCHLORIDE 1 MG/ML
0.2 INJECTION, SOLUTION INTRAMUSCULAR; INTRAVENOUS; SUBCUTANEOUS EVERY 5 MIN PRN
Status: DISCONTINUED | OUTPATIENT
Start: 2020-11-04 | End: 2020-11-04 | Stop reason: HOSPADM

## 2020-11-04 RX ORDER — MUPIROCIN 20 MG/G
OINTMENT TOPICAL
Status: DISCONTINUED | OUTPATIENT
Start: 2020-11-04 | End: 2020-11-04 | Stop reason: HOSPADM

## 2020-11-04 RX ORDER — FENTANYL CITRATE 50 UG/ML
INJECTION, SOLUTION INTRAMUSCULAR; INTRAVENOUS
Status: DISCONTINUED | OUTPATIENT
Start: 2020-11-04 | End: 2020-11-04

## 2020-11-04 RX ORDER — NEOSTIGMINE METHYLSULFATE 0.5 MG/ML
INJECTION, SOLUTION INTRAVENOUS
Status: DISCONTINUED | OUTPATIENT
Start: 2020-11-04 | End: 2020-11-04

## 2020-11-04 RX ORDER — ROCURONIUM BROMIDE 10 MG/ML
INJECTION, SOLUTION INTRAVENOUS
Status: DISCONTINUED | OUTPATIENT
Start: 2020-11-04 | End: 2020-11-04

## 2020-11-04 RX ORDER — PHENYLEPHRINE HYDROCHLORIDE 10 MG/ML
INJECTION INTRAVENOUS
Status: DISCONTINUED | OUTPATIENT
Start: 2020-11-04 | End: 2020-11-04

## 2020-11-04 RX ORDER — HEPARIN SODIUM 5000 [USP'U]/ML
5000 INJECTION, SOLUTION INTRAVENOUS; SUBCUTANEOUS ONCE
Status: COMPLETED | OUTPATIENT
Start: 2020-11-04 | End: 2020-11-04

## 2020-11-04 RX ADMIN — PHENYLEPHRINE HYDROCHLORIDE 100 MCG: 10 INJECTION INTRAVENOUS at 05:11

## 2020-11-04 RX ADMIN — DEXAMETHASONE SODIUM PHOSPHATE 4 MG: 4 INJECTION, SOLUTION INTRAMUSCULAR; INTRAVENOUS at 05:11

## 2020-11-04 RX ADMIN — SODIUM CHLORIDE, SODIUM GLUCONATE, SODIUM ACETATE, POTASSIUM CHLORIDE, MAGNESIUM CHLORIDE, SODIUM PHOSPHATE, DIBASIC, AND POTASSIUM PHOSPHATE: .53; .5; .37; .037; .03; .012; .00082 INJECTION, SOLUTION INTRAVENOUS at 05:11

## 2020-11-04 RX ADMIN — LIDOCAINE HYDROCHLORIDE 100 MG: 20 INJECTION, SOLUTION INTRAVENOUS at 04:11

## 2020-11-04 RX ADMIN — MIDAZOLAM HYDROCHLORIDE 2 MG: 1 INJECTION, SOLUTION INTRAMUSCULAR; INTRAVENOUS at 04:11

## 2020-11-04 RX ADMIN — FENTANYL CITRATE 25 MCG: 50 INJECTION, SOLUTION INTRAMUSCULAR; INTRAVENOUS at 05:11

## 2020-11-04 RX ADMIN — NEOSTIGMINE METHYLSULFATE 4 MG: 0.5 INJECTION INTRAVENOUS at 06:11

## 2020-11-04 RX ADMIN — SODIUM CHLORIDE: 0.9 INJECTION, SOLUTION INTRAVENOUS at 04:11

## 2020-11-04 RX ADMIN — ONDANSETRON 4 MG: 2 INJECTION, SOLUTION INTRAMUSCULAR; INTRAVENOUS at 06:11

## 2020-11-04 RX ADMIN — CEFAZOLIN 2 G: 330 INJECTION, POWDER, FOR SOLUTION INTRAMUSCULAR; INTRAVENOUS at 04:11

## 2020-11-04 RX ADMIN — FENTANYL CITRATE 100 MCG: 50 INJECTION, SOLUTION INTRAMUSCULAR; INTRAVENOUS at 04:11

## 2020-11-04 RX ADMIN — ROCURONIUM BROMIDE 50 MG: 10 INJECTION, SOLUTION INTRAVENOUS at 04:11

## 2020-11-04 RX ADMIN — PROPOFOL 150 MG: 10 INJECTION, EMULSION INTRAVENOUS at 04:11

## 2020-11-04 RX ADMIN — HEPARIN SODIUM 5000 UNITS: 5000 INJECTION INTRAVENOUS; SUBCUTANEOUS at 04:11

## 2020-11-04 RX ADMIN — MUPIROCIN: 20 OINTMENT TOPICAL at 04:11

## 2020-11-04 NOTE — H&P
[facility]  History & Physical  Plastic Surgery    SUBJECTIVE:     Chief Complaint/Reason for Admission: S/p breast reconstruction     History of Present Illness:  Alton Bach is a 40 y.o. year old female who presents to the Plastic Surgery Clinic on 11/04/2020 for follow up visit status post B breast reconstruction with direct silicone implant placement on 10/12/20. We have been following the patient closely as she has some skin necrosis of the L breast and does report to be actively smoking. The area has demarcated and was partially debrided today - decision was made to go to OR today to exchange L breast implant to a tissue expander. Denies fever, chills, nausea, vomiting, or other systemic signs of infection.    No medications prior to admission.       Review of patient's allergies indicates:   Allergen Reactions    Coconut Swelling and Rash    Dye Swelling and Blisters     Hair Dye    Strawberry Swelling and Rash       History reviewed. No pertinent past medical history.  Past Surgical History:   Procedure Laterality Date    breast augmentation      BREAST BIOPSY Right     CHOLECYSTECTOMY      MASTECTOMY Bilateral 10/12/2020    Procedure: MASTECTOMY-Bilateral ;  Surgeon: Anahi Huitron MD;  Location: [address];  Service: General;  Laterality: Bilateral;    REPLACEMENT OF IMPLANT OF BREAST Bilateral 10/12/2020    Procedure: REPLACEMENT, IMPLANT, BREAST-Bilateral;  Surgeon: Haresh Porter MD;  Location: [address];  Service: Plastics;  Laterality: Bilateral;    SENTINEL LYMPH NODE BIOPSY Bilateral 10/12/2020    Procedure: BIOPSY, LYMPH NODE, SENTINEL-Bilateral;  Surgeon: Anahi Huitron MD;  Location: [address];  Service: General;  Laterality: Bilateral;     Family History   Problem Relation Age of Onset    Hypertension Mother     Heart disease Father      Social History     Tobacco Use    Smoking status: Current Every Day Smoker     Packs/day:  0.50    Smokeless tobacco: Never Used   Substance Use Topics    Alcohol use: No    Drug use: No        Review of Systems:  nonviable skin of L breast    OBJECTIVE:     Vital Signs (Most Recent):       Physical Exam:  WD WN NAD  VSS  Normal resp effort  L breast - incision CDI, no erythema or drainage, nonviable skin, surgically absent nipple  R breast - incision CDI, no erythema or drainage, surgically absent nipple    Laboratory:  CBC: No results for input(s): WBC, RBC, HGB, HCT, PLT, MCV, MCH, MCHC in the last 168 hours.  CMP: No results for input(s): GLU, CALCIUM, ALBUMIN, PROT, NA, K, CO2, CL, BUN, CREATININE, ALKPHOS, ALT, AST, BILITOT in the last 168 hours.    Diagnostic Results:  none    ASSESSMENT/PLAN:     40 y.o. female status post B breast reconstruction     - Patient seen and evaluated by Dr. Haresh Porter    - Nonviable skin of L breast, will need to be debrided under general anesthesia and implant removed and replaced with tissue expander as there will not be enough skin to close and make the patient the size she wishes to be. Patient understands the current plan and agrees to proceed with surgery today.  - Case request placed, patient NPO since 8pm 11/3.   - Return to clinic in 1 week

## 2020-11-04 NOTE — PROGRESS NOTES
Subjective:      Alton Bach is a 40 y.o. year old female who presents to the Plastic Surgery Clinic on 11/04/2020 for follow up visit status post B breast reconstruction with direct silicone implant placement on 10/12/20. We have been following the patient closely as she has some skin necrosis of the L breast and does report to be actively smoking. The area has demarcated and was partially debrided today - decision was made to go to OR today to exchange L breast implant to a tissue expander. Denies fever, chills, nausea, vomiting, or other systemic signs of infection.    Vitals:    11/04/20 0833   BP: (!) 153/84   Pulse: 82        Review of patient's allergies indicates:   Allergen Reactions    Coconut Swelling and Rash    Dye Swelling and Blisters     Hair Dye    Strawberry Swelling and Rash       Current Outpatient Medications on File Prior to Visit   Medication Sig Dispense Refill    ibuprofen (ADVIL,MOTRIN) 600 MG tablet Take 1 tablet (600 mg total) by mouth 3 (three) times daily. 30 tablet 0    methylPREDNISolone (MEDROL DOSEPACK) 4 mg tablet use as directed 1 Package 0    multivitamin (ONE DAILY MULTIVITAMIN) per tablet Take 1 tablet by mouth once daily.      oxyCODONE-acetaminophen (PERCOCET) 5-325 mg per tablet Take 1 tablet by mouth every 6 (six) hours as needed for Pain. 43 tablet 0     Current Facility-Administered Medications on File Prior to Visit   Medication Dose Route Frequency Provider Last Rate Last Dose    heparin (porcine) injection 5,000 Units  5,000 Units Subcutaneous Once Karen Garcia MD        sodium chloride 0.9% flush 10 mL  10 mL Intravenous PRN Karen Garcia MD           Patient Active Problem List   Diagnosis    Groin strain    Ductal carcinoma in situ (DCIS) of left breast    Breast cancer       Past Surgical History:   Procedure Laterality Date    breast augmentation      BREAST BIOPSY Right     CHOLECYSTECTOMY      MASTECTOMY Bilateral 10/12/2020     Procedure: MASTECTOMY-Bilateral ;  Surgeon: Anahi Huitron MD;  Location: [address];  Service: General;  Laterality: Bilateral;    REPLACEMENT OF IMPLANT OF BREAST Bilateral 10/12/2020    Procedure: REPLACEMENT, IMPLANT, BREAST-Bilateral;  Surgeon: Haresh Porter MD;  Location: [address];  Service: Plastics;  Laterality: Bilateral;    SENTINEL LYMPH NODE BIOPSY Bilateral 10/12/2020    Procedure: BIOPSY, LYMPH NODE, SENTINEL-Bilateral;  Surgeon: Anahi Huitron MD;  Location: [address];  Service: General;  Laterality: Bilateral;       Social History     Socioeconomic History    Marital status: Single     Spouse name: Not on file    Number of children: Not on file    Years of education: Not on file    Highest education level: Not on file   Occupational History    Not on file   Social Needs    Financial resource strain: Not on file    Food insecurity     Worry: Not on file     Inability: Not on file    Transportation needs     Medical: Not on file     Non-medical: Not on file   Tobacco Use    Smoking status: Current Every Day Smoker     Packs/day: 0.50    Smokeless tobacco: Never Used   Substance and Sexual Activity    Alcohol use: No    Drug use: No    Sexual activity: Not on file   Lifestyle    Physical activity     Days per week: Not on file     Minutes per session: Not on file    Stress: Not on file   Relationships    Social connections     Talks on phone: Not on file     Gets together: Not on file     Attends Scientologist service: Not on file     Active member of club or organization: Not on file     Attends meetings of clubs or organizations: Not on file     Relationship status: Not on file   Other Topics Concern    Not on file   Social History Narrative    Not on file     Date of surgery on 12/29   Preoperative diagnosis breast cancer  Postoperative diagnosis is same  Procedure performed: immediate bilateral breast reconstruction using implants  Placement of AlloDerm a  left breast  Placement of AlloDerm right breast  Surgeon German  Anesthesia general  Complications none  Drains times for  Blood loss minimal      Review of Systems: nonviable skin of L breast    Objective:     Physical Exam:  Vitals:    11/04/20 0833   BP: (!) 153/84   Pulse: 82       WD WN NAD  VSS  Normal resp effort  L breast - incision CDI, no erythema or drainage, nonviable skin, surgically absent nipple  R breast - incision CDI, no erythema or drainage, surgically absent nipple        Assessment:       1. Personal history of breast cancer        Plan:   40 y.o. female status post B breast reconstruction   - Patient seen and evaluated by myself and Dr. Haresh Porter    - Nonviable skin of L breast, will need to be debrided under general anesthesia and implant removed and replaced with tissue expander as there will not be enough skin to close and make the patient the size she wishes to be. Patient understands the current plan and agrees to proceed with surgery today.  - Case request placed, patient NPO since 8pm.   - Return to clinic in 1 week.       All questions were answered. The patient was advised to call the clinic with any questions or concerns prior to their next visit.           Luz Elena Caldwell PA-C  Plastic and Reconstruction Surgery Department  [phone] office

## 2020-11-04 NOTE — ANESTHESIA PREPROCEDURE EVALUATION
11/04/2020  Alton Bach is a 40 y.o., female.    Alton Bach is a 40 y.o. year old female who presents to the Plastic Surgery Clinic on 11/04/2020 for follow up visit status post B breast reconstruction with direct silicone implant placement on 10/12/20. We have been following the patient closely as she has some skin necrosis of the L breast and does report to be actively smoking. The area has demarcated and was partially debrided today - decision was made to go to OR today to exchange L breast implant to a tissue expander.      Procedure: REPLACEMENT, IMPLANT, BREAST - left (Left )   Anesthesia type: General   Diagnosis: Personal history of breast cancer [Z85.3]         Pre-operative evaluation for Procedure(s) (LRB):  REPLACEMENT, IMPLANT, BREAST - left (Left)    No diagnosis found.    Review of patient's allergies indicates:   Allergen Reactions    Coconut Swelling and Rash    Dye Swelling and Blisters     Hair Dye    Strawberry Swelling and Rash       Current Facility-Administered Medications on File Prior to Encounter   Medication Dose Route Frequency Provider Last Rate Last Dose    heparin (porcine) injection 5,000 Units  5,000 Units Subcutaneous Once Karen Garcia MD        sodium chloride 0.9% flush 10 mL  10 mL Intravenous PRN Karen Garcia MD         Current Outpatient Medications on File Prior to Encounter   Medication Sig Dispense Refill    ibuprofen (ADVIL,MOTRIN) 600 MG tablet Take 1 tablet (600 mg total) by mouth 3 (three) times daily. 30 tablet 0    methylPREDNISolone (MEDROL DOSEPACK) 4 mg tablet use as directed 1 Package 0    multivitamin (ONE DAILY MULTIVITAMIN) per tablet Take 1 tablet by mouth once daily.      oxyCODONE-acetaminophen (PERCOCET) 5-325 mg per tablet Take 1 tablet by mouth every 6 (six) hours as needed for Pain. 43 tablet 0       Social History      Tobacco Use   Smoking Status Current Every Day Smoker    Packs/day: 0.50   Smokeless Tobacco Never Used       Social History     Substance and Sexual Activity   Alcohol Use No       Patient Active Problem List   Diagnosis    Groin strain    Ductal carcinoma in situ (DCIS) of left breast    Breast cancer       Past Surgical History:   Procedure Laterality Date    breast augmentation      BREAST BIOPSY Right     CHOLECYSTECTOMY      MASTECTOMY Bilateral 10/12/2020    Procedure: MASTECTOMY-Bilateral ;  Surgeon: Anahi Huitron MD;  Location: [address];  Service: General;  Laterality: Bilateral;    REPLACEMENT OF IMPLANT OF BREAST Bilateral 10/12/2020    Procedure: REPLACEMENT, IMPLANT, BREAST-Bilateral;  Surgeon: Haresh Porter MD;  Location: [address];  Service: Plastics;  Laterality: Bilateral;    SENTINEL LYMPH NODE BIOPSY Bilateral 10/12/2020    Procedure: BIOPSY, LYMPH NODE, SENTINEL-Bilateral;  Surgeon: Anahi Huitron MD;  Location: [address];  Service: General;  Laterality: Bilateral;           No results for input(s): HCT in the last 72 hours.  No results for input(s): PLT in the last 72 hours.  No results for input(s): K in the last 72 hours.  No results for input(s): CREATININE in the last 72 hours.  No results for input(s): GLU in the last 72 hours.  No results for input(s): PT in the last 72 hours.    Performed by: Neal Ventura MD  Authorized by: Park Shane MD      Intubation:     Induction:  Rapid sequence induction (pt with severe nausea currently)    Intubated:  Postinduction    Mask Ventilation:  N/a    Attempts:  1    Attempted By:  Resident anesthesiologist    Method of Intubation:  Direct    Blade:  Jennings 2    Laryngeal View Grade: Grade I - full view of chords      Difficult Airway Encountered?: No      Complications:  None    Airway Device:  Oral endotracheal tube    Airway Device Size:  7.0    Style/Cuff Inflation:  Cuffed (inflated to minimal  occlusive pressure)    Tube secured:  21    Secured at:  The lips    Placement Verified By:  Capnometry and Revisualization with laryngoscopy    Complicating Factors:  Small mouth    Findings Post-Intubation:  BS equal bilateral and atraumatic/condition of teeth unchanged              '  Anesthesia Evaluation          Review of Systems  Anesthesia Hx:  No problems with previous Anesthesia   Hematology/Oncology:  Hematology Normal      Current/Recent Cancer. Breast Oncology Comments: bilat mast with lnd's. No edema     Cardiovascular:   Denies Hypertension.  Denies MI.    Denies Angina. 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  Denies Asthma.  Denies Shortness of breath.    Renal/:   Denies Chronic Renal Disease.     Hepatic/GI:   Denies Liver Disease.    Neurological:   Denies TIA. Denies CVA. Denies Seizures.    Endocrine:   Denies Diabetes.        Physical Exam  General:  Well nourished    Airway/Jaw/Neck:  Airway Findings: Mouth Opening: Normal Tongue: Normal  General Airway Assessment: Adult, Average  Mallampati: III  TM Distance: Normal, at least 6 cm  Jaw/Neck Findings:  Neck ROM: Normal ROM            Mental Status:  Mental Status Findings:  Cooperative, Alert and Oriented         Anesthesia Plan  Type of Anesthesia, risks & benefits discussed:  Anesthesia Type:  general  Patient's Preference:   Intra-op Monitoring Plan:   Intra-op Monitoring Plan Comments:   Post Op Pain Control Plan:   Post Op Pain Control Plan Comments: As per surgeon's plan  Induction:   IV  Beta Blocker:  Patient is not currently on a Beta-Blocker (No further documentation required).       Informed Consent: Patient understands risks and agrees with Anesthesia plan.  Questions answered. Anesthesia consent signed with patient.  ASA Score: 2     Day of Surgery Review of History & Physical:    H&P update referred to the surgeon.         Ready For Surgery From  Anesthesia Perspective.

## 2020-11-04 NOTE — ANESTHESIA PROCEDURE NOTES
Intubation  Performed by: Rosemary Morris CRNA  Authorized by: Rosemary Morris CRNA     Intubation:     Induction:  Intravenous    Intubated:  Postinduction    Mask Ventilation:  Easy with oral airway    Attempts:  1    Attempted By:  CRNA    Method of Intubation:  Direct    Blade:  Jennings 2    Laryngeal View Grade: Grade IIA - cords partially seen      Difficult Airway Encountered?: No      Complications:  None    Airway Device:  Oral endotracheal tube    Airway Device Size:  7.5    Style/Cuff Inflation:  Cuffed (inflated to minimal occlusive pressure)    Tube secured:  21    Secured at:  The lips    Placement Verified By:  Capnometry    Complicating Factors:  Anterior larynx    Findings Post-Intubation:  BS equal bilateral

## 2020-11-05 ENCOUNTER — PATIENT MESSAGE (OUTPATIENT)
Dept: PLASTIC SURGERY | Facility: CLINIC | Age: 40
End: 2020-11-05

## 2020-11-05 NOTE — BRIEF OP NOTE
[facility]  Brief Operative Note    Surgery Date: 11/4/2020     Surgeon(s) and Role:     * Haresh Porter MD - Primary     * Karen Garcia MD - Resident - Assisting     * Alvarado Palacios MD - Fellow        Pre-op Diagnosis:  Personal history of breast cancer [Z85.3]    Post-op Diagnosis:  Post-Op Diagnosis Codes:     * Personal history of breast cancer [Z85.3]    Procedure(s) (LRB):  REPLACEMENT, IMPLANT, BREAST - left (Left)    Anesthesia: General    Description of the findings of the procedure(s): See op note    Estimated Blood Loss: * No values recorded between 11/4/2020  5:10 PM and 11/4/2020  6:35 PM *         Specimens:   Specimen (12h ago, onward)    None            Discharge Note    OUTCOME: Patient tolerated treatment/procedure well without complication and is now ready for discharge.    DISPOSITION: Home or Self Care    FINAL DIAGNOSIS:  <principal problem not specified>    FOLLOWUP: In clinic    DISCHARGE INSTRUCTIONS:  No discharge procedures on file.

## 2020-11-05 NOTE — DISCHARGE SUMMARY
[facility]  Discharge Summary  Plastic Surgery      Admit Date: 11/4/2020    Discharge Date and Time:  11/04/2020 6:45 PM    Attending Physician: Haresh Porter, *     Discharge Provider: Karen Garcia    Reason for Admission: Implant removal    Procedures Performed: Procedure(s) (LRB):  Left Breast Implant Exchange (Left)    Hospital Course the patient arrived to pre-op area for proper pre-operative management.  Upon completion of pre-operative preparation, the patient was taken back to the operative theatre.  A left implant removal and tissue expander placement was performed without complication and the patient was transported to the post anesthesia care unit in stable condition. After appropriate recovery from the anaesthetic agents used during the surgery the patient was deemed safe for DC, they were discharged home.          Consults: none    Significant Diagnostic Studies: none    Final Diagnoses:    Principal Problem: Breast cancer   Secondary Diagnoses:   Active Hospital Problems    Diagnosis  POA    *Breast cancer [C50.919]  Yes      Resolved Hospital Problems   No resolved problems to display.       Discharged Condition: stable    Disposition: Home or Self Care    Follow Up/Patient Instructions:     Medications:  Reconciled Home Medications:      Medication List      START taking these medications    HYDROcodone-acetaminophen 5-325 mg per tablet  Commonly known as: NORCO  Take 1 tablet by mouth every 4 (four) hours as needed for Pain.     sulfamethoxazole-trimethoprim 800-160mg 800-160 mg Tab  Commonly known as: BACTRIM DS  Take 1 tablet by mouth 2 (two) times daily. for 7 days        CONTINUE taking these medications    ibuprofen 600 MG tablet  Commonly known as: ADVIL,MOTRIN  Take 1 tablet (600 mg total) by mouth 3 (three) times daily.     methylPREDNISolone 4 mg tablet  Commonly known as: MEDROL DOSEPACK  use as directed     ONE DAILY MULTIVITAMIN per tablet  Generic drug:  multivitamin  Take 1 tablet by mouth once daily.     oxyCODONE-acetaminophen 5-325 mg per tablet  Commonly known as: PERCOCET  Take 1 tablet by mouth every 6 (six) hours as needed for Pain.          Discharge Procedure Orders   Diet general     Wound care routine (specify)   Order Comments: Wound care routine: Wound care routine: Shower in 48 hours  Drain care, record daily and bring logs to clinic  Wear bra at all times except for shower. Do not use any other type of compressive dressing over your incision.   Call clinic with concerns/questions

## 2020-11-05 NOTE — DISCHARGE INSTRUCTIONS
Care After Breast Implants  You had a procedure called breast augmentation (enlargement). It is also known as augmentation mammoplasty. This surgery enhances the size and shape of a womans breasts. Women choose breast augmentation to enlarge breast size, to correct a reduction in breast size after pregnancy, to balance a difference in breast size, or to reconstruct the breast following breast surgery or mastectomy. Heres what you need to do after this procedure.  Activity  · Dont raise your arms above breast level until your health care provider says its OK. This prevents the implants from shifting.  · Dont lift, push, or pull anything heavier than 10 pounds for at least 5 to 7 days.  · Sleep on your back. Use pillows to keep the upper part of your body elevated.  · Dont drive until your health care provider says its OK.  Other home care  · Keep an ice pack on your chest to relieve discomfort and to avoid extra swelling. Put the ice pack on for 20 minutes; then leave it off for 20 minutes. Repeat as often as necessary.  · Wear the special bra or bandage you were given before discharge as directed by your health care provider. Expect to wear the bra or wrap 24 hours a day for about 3 to 4 weeks. You may remove it when you shower, starting 3 days after your surgery.  · Shower as necessary, starting 3 days after surgery. Gently wash your incision site. Pat the incision dry. Dont apply lotions, oils, or creams.  · Do not submerge your incision in a tub bath until it is completely closed. Doing so may introduce bacteria and cause an infection.  · You will have a dressing over your incisions. Be sure to ask your health care provider how to care for your dressing. Your stitches may dissolve on their own. Or, they may be removed at a follow-up appointment. If you have small white adhesive strips at your incision sites, do not remove them. They will come off on their own.  · Make an appointment to have your  stitches or staples removed in 7 to 10 days.  · Remember, the swelling in your breasts may take 3 to 5 weeks to disappear.  · Take your medication exactly as directed.  Follow-up  Make a follow-up appointment as directed by our staff.  When to call your health care provider  Call your  health care provider right away if you have any of the following:  · Trouble breathing  · Sudden shortness of breath or gradual shortness of breath that gets worse  · Sudden chest pain  · Fever of 100.4°F (38°C) or higher, or chills  · Bleeding or drainage through the special bra or bandage  · Pain that is not relieved by medication; increasing pain, with or without activity  · More soreness, swelling, or bruising on 1 breast than the other  · Redness, or breasts that feel warm to the touch  · Any rapid swelling in 1 area or breast   Date Last Reviewed: 4/20/2015  © 4138-6319 [employer]. [address] [place] [address]. All rights reserved. This information is not intended as a substitute for professional medical care. Always follow your healthcare professional's instructions.      Wound care routine: Shower in 48 hours  Drain care, record daily and bring logs to clinic  Wear bra at all times except for shower. Do not use any other type of compressive dressing over your incision.   Call clinic with concerns/questions

## 2020-11-05 NOTE — ANESTHESIA POSTPROCEDURE EVALUATION
Anesthesia Post Evaluation    Patient: Alton Bach    Procedure(s) Performed: Procedure(s) (LRB):  Left Breast Implant Exchange (Left)    Final Anesthesia Type: general    Patient location during evaluation: PACU  Patient participation: Yes- Able to Participate  Level of consciousness: awake and alert and oriented  Post-procedure vital signs: reviewed and stable  Pain management: adequate  Airway patency: patent  CIRILO mitigation strategies: Intraoperative administration of CPAP, nasopharyngeal airway, or oral appliance during sedation, Multimodal analgesia, Extubation while patient is awake, Verification of full reversal of neuromuscular block and Extubation and recovery carried out in lateral, semiupright, or other nonsupine position  PONV status at discharge: No PONV  Anesthetic complications: no      Cardiovascular status: hemodynamically stable  Respiratory status: unassisted  Hydration status: euvolemic  Follow-up not needed.          Vitals Value Taken Time   /57 11/04/20 1947   Temp 36.6 °C (97.9 °F) 11/04/20 1844   Pulse 92 11/04/20 2000   Resp 28 11/04/20 2000   SpO2 98 % 11/04/20 2000         No case tracking events are documented in the log.      Pain/Mehrdad Score: Mehrdad Score: 10 (11/4/2020  8:00 PM)

## 2020-11-05 NOTE — TRANSFER OF CARE
"Anesthesia Transfer of Care Note    Patient: Alton Bach    Procedure(s) Performed: Procedure(s) (LRB):  Left Breast Implant Exchange (Left)    Patient location: [facility]    Anesthesia Type: general    Transport from OR: Transported from OR on 6-10 L/min O2 by face mask with adequate spontaneous ventilation    Post pain: adequate analgesia    Post assessment: no apparent anesthetic complications and tolerated procedure well    Post vital signs: stable    Level of consciousness: sedated and responds to stimulation    Nausea/Vomiting: no nausea/vomiting    Complications: none    Transfer of care protocol was followed      Last vitals:   Visit Vitals  /78 (BP Location: Left arm, Patient Position: Lying)   Pulse 101   Temp 36.7 °C (98.1 °F) (Oral)   Resp 16   Ht 5' 7" (1.702 m)   Wt 61.2 kg (135 lb)   LMP 10/19/2020   SpO2 98%   Breastfeeding No   BMI 21.14 kg/m²     "

## 2020-11-05 NOTE — OP NOTE
Date of surgery 11/04/2020  Preoperative diagnosis breast cancer  2.partial mastectomy flap necrosis secondary to patient noncompliance  Procedure  1)Irrigation and debridement of mastectomy flap skin skin and subcutaneous tissue only measuring approximately 2 cm x 6 cm  2)left implant exchange.    3 Extensive superior capsulotomy left breast  4) removal alloderm  Surgeon Babycos  Anesthesia general  Complications none  Drains x1  Blood loss minimal.      This patient had bilateral breast reconstruction with direct implant.  Patient remove her outer dressing in placed Coban as well as an Ace wrap around her breasts trapping her incisional wound VAC tubing causing necrosis of the skin.  Joe did local wound care for a week but it was obvious that 1 portion would not survive.    Patient was taken to the operative room placed in supine position after adequate general endotracheal anesthesia was prepped and draped in a sterile fashion.  Previous mastectomy incision was opened.  The AllanDerm was removed.  Next, the implant was removed.  A 450 cc tissue expander was opened on the back table soaked in triple antibiotic solution all air was removed.  It was unable to fit in the previous pocket therefore a open capsulotomy needed to be done superiorly extensively.  Next, the tissue expander was then placed in the subpectoral pocket.  No air was placed.  Muscle was closed using running 2-0 Vicryl skin using 3-0 Monocryl running 4-0 Monocryl subcuticular suture a 15 Martin drain had been placed.  There were no complications.

## 2020-11-05 NOTE — PLAN OF CARE
Patient tolerated procedure and anesthesia with no complaints of pain or nausea. Discharge material given and explained to patient and family. Both verbalized understanding. Patient awaiting ride via wheelchair in stable condition with no complaints.

## 2020-11-09 LAB
BACTERIA SPEC AEROBE CULT: NO GROWTH
BACTERIA SPEC ANAEROBE CULT: NORMAL

## 2020-11-11 ENCOUNTER — OFFICE VISIT (OUTPATIENT)
Dept: PLASTIC SURGERY | Facility: CLINIC | Age: 40
End: 2020-11-11
Payer: COMMERCIAL

## 2020-11-11 VITALS
SYSTOLIC BLOOD PRESSURE: 138 MMHG | BODY MASS INDEX: 21.18 KG/M2 | HEART RATE: 89 BPM | HEIGHT: 67 IN | DIASTOLIC BLOOD PRESSURE: 93 MMHG | WEIGHT: 134.94 LBS

## 2020-11-11 DIAGNOSIS — Z09 SURGERY FOLLOW-UP EXAMINATION: Primary | ICD-10-CM

## 2020-11-11 PROCEDURE — 99999 PR PBB SHADOW E&M-EST. PATIENT-LVL III: CPT | Mod: PBBFAC,,, | Performed by: PHYSICIAN ASSISTANT

## 2020-11-11 PROCEDURE — 99999 PR PBB SHADOW E&M-EST. PATIENT-LVL III: ICD-10-PCS | Mod: PBBFAC,,, | Performed by: PHYSICIAN ASSISTANT

## 2020-11-11 PROCEDURE — 99024 POSTOP FOLLOW-UP VISIT: CPT | Mod: S$GLB,,, | Performed by: PHYSICIAN ASSISTANT

## 2020-11-11 PROCEDURE — 99024 PR POST-OP FOLLOW-UP VISIT: ICD-10-PCS | Mod: S$GLB,,, | Performed by: PHYSICIAN ASSISTANT

## 2020-11-17 ENCOUNTER — OFFICE VISIT (OUTPATIENT)
Dept: PLASTIC SURGERY | Facility: CLINIC | Age: 40
End: 2020-11-17
Payer: COMMERCIAL

## 2020-11-17 VITALS
DIASTOLIC BLOOD PRESSURE: 65 MMHG | HEIGHT: 67 IN | WEIGHT: 134.94 LBS | SYSTOLIC BLOOD PRESSURE: 133 MMHG | BODY MASS INDEX: 21.18 KG/M2 | HEART RATE: 91 BPM

## 2020-11-17 DIAGNOSIS — Z09 SURGERY FOLLOW-UP EXAMINATION: Primary | ICD-10-CM

## 2020-11-17 PROCEDURE — 3008F BODY MASS INDEX DOCD: CPT | Mod: CPTII,S$GLB,, | Performed by: PHYSICIAN ASSISTANT

## 2020-11-17 PROCEDURE — 99024 POSTOP FOLLOW-UP VISIT: CPT | Mod: S$GLB,,, | Performed by: PHYSICIAN ASSISTANT

## 2020-11-17 PROCEDURE — 99024 PR POST-OP FOLLOW-UP VISIT: ICD-10-PCS | Mod: S$GLB,,, | Performed by: PHYSICIAN ASSISTANT

## 2020-11-17 PROCEDURE — 3008F PR BODY MASS INDEX (BMI) DOCUMENTED: ICD-10-PCS | Mod: CPTII,S$GLB,, | Performed by: PHYSICIAN ASSISTANT

## 2020-11-17 PROCEDURE — 1126F AMNT PAIN NOTED NONE PRSNT: CPT | Mod: S$GLB,,, | Performed by: PHYSICIAN ASSISTANT

## 2020-11-17 PROCEDURE — 99999 PR PBB SHADOW E&M-EST. PATIENT-LVL III: ICD-10-PCS | Mod: PBBFAC,,, | Performed by: PHYSICIAN ASSISTANT

## 2020-11-17 PROCEDURE — 99999 PR PBB SHADOW E&M-EST. PATIENT-LVL III: CPT | Mod: PBBFAC,,, | Performed by: PHYSICIAN ASSISTANT

## 2020-11-17 PROCEDURE — 1126F PR PAIN SEVERITY QUANTIFIED, NO PAIN PRESENT: ICD-10-PCS | Mod: S$GLB,,, | Performed by: PHYSICIAN ASSISTANT

## 2020-11-17 NOTE — PROGRESS NOTES
Subjective:      Alton Bach is a 40 y.o. year old female who presents to the Plastic Surgery Clinic on 11/17/2020 for follow up visit status post B breast reconstruction with direct silicone implant placement on 10/12/20 with subsequent return to OR for removal of nonviable skin of left breast and exchange of implant to tissue expander. She has been applying bacitracin ointment to two areas of concern on L breast. Denies fever, chills, nausea, vomiting, or other systemic signs of infection.    Vitals:    11/17/20 0948   BP: 133/65   Pulse: 91        Review of patient's allergies indicates:   Allergen Reactions    Coconut Swelling and Rash    Dye Swelling and Blisters     Hair Dye    Strawberry Swelling and Rash       Current Outpatient Medications on File Prior to Visit   Medication Sig Dispense Refill    HYDROcodone-acetaminophen (NORCO) 5-325 mg per tablet Take 1 tablet by mouth every 4 (four) hours as needed for Pain. 16 tablet 0    ibuprofen (ADVIL,MOTRIN) 600 MG tablet Take 1 tablet (600 mg total) by mouth 3 (three) times daily. 30 tablet 0    multivitamin (ONE DAILY MULTIVITAMIN) per tablet Take 1 tablet by mouth once daily.      oxyCODONE-acetaminophen (PERCOCET) 5-325 mg per tablet Take 1 tablet by mouth every 6 (six) hours as needed for Pain. 43 tablet 0     Current Facility-Administered Medications on File Prior to Visit   Medication Dose Route Frequency Provider Last Rate Last Dose    heparin (porcine) injection 5,000 Units  5,000 Units Subcutaneous Once Karen Garcia MD        sodium chloride 0.9% flush 10 mL  10 mL Intravenous PRN Karen Garcia MD           Patient Active Problem List   Diagnosis    Groin strain    Ductal carcinoma in situ (DCIS) of left breast    Breast cancer       Past Surgical History:   Procedure Laterality Date    breast augmentation      BREAST BIOPSY Right     CHOLECYSTECTOMY      MASTECTOMY Bilateral 10/12/2020    Procedure: MASTECTOMY-Bilateral ;   Surgeon: Anahi Huitron MD;  Location: [address];  Service: General;  Laterality: Bilateral;    REPLACEMENT OF IMPLANT OF BREAST Bilateral 10/12/2020    Procedure: REPLACEMENT, IMPLANT, BREAST-Bilateral;  Surgeon: Haresh Porter MD;  Location: [facility] OR [address] [place] [address];  Service: Plastics;  Laterality: Bilateral;    REPLACEMENT OF IMPLANT OF BREAST Left 11/4/2020    Procedure: Left Breast Implant Exchange;  Surgeon: Haresh Porter MD;  Location: [facility] OR [address] [place] [address];  Service: Plastics;  Laterality: Left;    SENTINEL LYMPH NODE BIOPSY Bilateral 10/12/2020    Procedure: BIOPSY, LYMPH NODE, SENTINEL-Bilateral;  Surgeon: Anahi Huitron MD;  Location: [facility] OR [address] [place] [address];  Service: General;  Laterality: Bilateral;       Social History     Socioeconomic History    Marital status: Single     Spouse name: Not on file    Number of children: Not on file    Years of education: Not on file    Highest education level: Not on file   Occupational History    Not on file   Social Needs    Financial resource strain: Not on file    Food insecurity     Worry: Not on file     Inability: Not on file    Transportation needs     Medical: Not on file     Non-medical: Not on file   Tobacco Use    Smoking status: Current Every Day Smoker     Packs/day: 0.50    Smokeless tobacco: Never Used   Substance and Sexual Activity    Alcohol use: No    Drug use: No    Sexual activity: Not on file   Lifestyle    Physical activity     Days per week: Not on file     Minutes per session: Not on file    Stress: Not on file   Relationships    Social connections     Talks on phone: Not on file     Gets together: Not on file     Attends Nondenominational service: Not on file     Active member of club or organization: Not on file     Attends meetings of clubs or organizations: Not on file     Relationship status: Not on file   Other Topics Concern    Not on file   Social History Narrative    Not on file     Date of surgery on 12/29    Preoperative diagnosis breast cancer  Postoperative diagnosis is same  Procedure performed: immediate bilateral breast reconstruction using implants  Placement of AlloDerm a left breast  Placement of AlloDerm right breast  Surgeon Babycos  Anesthesia general  Complications none  Drains times for  Blood loss minimal    Date of surgery 11/04/2020  Preoperative diagnosis breast cancer  2.partial mastectomy flap necrosis secondary to patient noncompliance  Procedure  1)Irrigation and debridement of mastectomy flap skin skin and subcutaneous tissue only measuring approximately 2 cm x 6 cm  2)left implant exchange.    3 Extensive superior capsulotomy left breast  4) removal alloderm  Surgeon Babycos  Anesthesia general  Complications none  Drains x1  Blood loss minimal.       Review of Systems: negative    Objective:     Physical Exam:  Vitals:    11/17/20 0948   BP: 133/65   Pulse: 91       WD WN NAD  VSS  Normal resp effort  L breast - incision CDI, no erythema, surgically absent nipple, 4mm x 4mm area of maceration along vertical incision  R breast - incision CDI, no erythema or drainage, surgically absent nipple        Assessment:       1. Surgery follow-up examination        Plan:   40 y.o. female status post B breast reconstruction     - Will c/w local wound care using bacitracin and nonstick dressing on area of concern. She understands that she is at a high risk of losing this implant and if the incision opens and implant becomes exposed, she will need to have this removed. Patient voiced understanding and admits to smoking 4 cigarettes since her last visit. Encouraged to avoid smoking and any nicotine containing products.   - Return to clinic in 1 week, appointment scheduled      All questions were answered. The patient was advised to call the clinic with any questions or concerns prior to their next visit.           Luz Elena Caldwell PA-C  Plastic and Reconstruction Surgery Department  [phone]  office

## 2020-11-24 ENCOUNTER — PATIENT MESSAGE (OUTPATIENT)
Dept: PLASTIC SURGERY | Facility: CLINIC | Age: 40
End: 2020-11-24

## 2020-11-24 ENCOUNTER — OFFICE VISIT (OUTPATIENT)
Dept: PLASTIC SURGERY | Facility: CLINIC | Age: 40
End: 2020-11-24
Payer: COMMERCIAL

## 2020-11-24 VITALS
HEART RATE: 96 BPM | WEIGHT: 134.94 LBS | HEIGHT: 67 IN | BODY MASS INDEX: 21.18 KG/M2 | SYSTOLIC BLOOD PRESSURE: 146 MMHG | DIASTOLIC BLOOD PRESSURE: 71 MMHG

## 2020-11-24 DIAGNOSIS — Z09 SURGERY FOLLOW-UP EXAMINATION: Primary | ICD-10-CM

## 2020-11-24 PROCEDURE — 1125F AMNT PAIN NOTED PAIN PRSNT: CPT | Mod: S$GLB,,, | Performed by: PHYSICIAN ASSISTANT

## 2020-11-24 PROCEDURE — 99999 PR PBB SHADOW E&M-EST. PATIENT-LVL III: CPT | Mod: PBBFAC,,, | Performed by: PHYSICIAN ASSISTANT

## 2020-11-24 PROCEDURE — 99999 PR PBB SHADOW E&M-EST. PATIENT-LVL III: ICD-10-PCS | Mod: PBBFAC,,, | Performed by: PHYSICIAN ASSISTANT

## 2020-11-24 PROCEDURE — 3008F PR BODY MASS INDEX (BMI) DOCUMENTED: ICD-10-PCS | Mod: CPTII,S$GLB,, | Performed by: PHYSICIAN ASSISTANT

## 2020-11-24 PROCEDURE — 1125F PR PAIN SEVERITY QUANTIFIED, PAIN PRESENT: ICD-10-PCS | Mod: S$GLB,,, | Performed by: PHYSICIAN ASSISTANT

## 2020-11-24 PROCEDURE — 99024 PR POST-OP FOLLOW-UP VISIT: ICD-10-PCS | Mod: S$GLB,,, | Performed by: PHYSICIAN ASSISTANT

## 2020-11-24 PROCEDURE — 99024 POSTOP FOLLOW-UP VISIT: CPT | Mod: S$GLB,,, | Performed by: PHYSICIAN ASSISTANT

## 2020-11-24 PROCEDURE — 3008F BODY MASS INDEX DOCD: CPT | Mod: CPTII,S$GLB,, | Performed by: PHYSICIAN ASSISTANT

## 2020-11-24 NOTE — LETTER
November 24, 2020      [facility]  [address]  [facility] [address]  Phone: [phone]  Fax: [phone]       Patient: Alton Bach   YOB: 1980  Date of Visit: 11/24/2020    To Whom It May Concern:    Alton Bach  was at [facility] on 11/24/2020. Alton may return to work on December 14, 2021. No lifting pushing or pulling more than 10lbs in regards to restrictions. If you have any questions or concerns, or if I can be of further assistance, please do not hesitate to contact me.    Sincerely,    TRACIE Hooker

## 2020-11-24 NOTE — PROGRESS NOTES
Subjective:      Alton Bach is a 40 y.o. year old female who presents to the Plastic Surgery Clinic on 11/24/2020 for follow up visit status post B breast reconstruction with direct silicone implant placement on 10/12/20 with subsequent return to OR for removal of nonviable skin of left breast and exchange of implant to tissue expander. She has been applying bacitracin ointment to two areas of concern on L breast and one area on R breast. Denies fever, chills, nausea, vomiting, or other systemic signs of infection.    Vitals:    11/24/20 0950   BP: (!) 146/71   Pulse: 96        Review of patient's allergies indicates:   Allergen Reactions    Coconut Swelling and Rash    Dye Swelling and Blisters     Hair Dye    Strawberry Swelling and Rash       Current Outpatient Medications on File Prior to Visit   Medication Sig Dispense Refill    HYDROcodone-acetaminophen (NORCO) 5-325 mg per tablet Take 1 tablet by mouth every 4 (four) hours as needed for Pain. 16 tablet 0    ibuprofen (ADVIL,MOTRIN) 600 MG tablet Take 1 tablet (600 mg total) by mouth 3 (three) times daily. 30 tablet 0    multivitamin (ONE DAILY MULTIVITAMIN) per tablet Take 1 tablet by mouth once daily.      oxyCODONE-acetaminophen (PERCOCET) 5-325 mg per tablet Take 1 tablet by mouth every 6 (six) hours as needed for Pain. 43 tablet 0     Current Facility-Administered Medications on File Prior to Visit   Medication Dose Route Frequency Provider Last Rate Last Dose    heparin (porcine) injection 5,000 Units  5,000 Units Subcutaneous Once Karen Garcia MD        sodium chloride 0.9% flush 10 mL  10 mL Intravenous PRN Karen Garcia MD           Patient Active Problem List   Diagnosis    Groin strain    Ductal carcinoma in situ (DCIS) of left breast    Breast cancer       Past Surgical History:   Procedure Laterality Date    breast augmentation      BREAST BIOPSY Right     CHOLECYSTECTOMY      MASTECTOMY Bilateral 10/12/2020     Procedure: MASTECTOMY-Bilateral ;  Surgeon: Anahi Huitron MD;  Location: [address];  Service: General;  Laterality: Bilateral;    REPLACEMENT OF IMPLANT OF BREAST Bilateral 10/12/2020    Procedure: REPLACEMENT, IMPLANT, BREAST-Bilateral;  Surgeon: Haersh Porter MD;  Location: [address];  Service: Plastics;  Laterality: Bilateral;    REPLACEMENT OF IMPLANT OF BREAST Left 11/4/2020    Procedure: Left Breast Implant Exchange;  Surgeon: Haresh Porter MD;  Location: [address];  Service: Plastics;  Laterality: Left;    SENTINEL LYMPH NODE BIOPSY Bilateral 10/12/2020    Procedure: BIOPSY, LYMPH NODE, SENTINEL-Bilateral;  Surgeon: Anahi Huitron MD;  Location: [address];  Service: General;  Laterality: Bilateral;       Social History     Socioeconomic History    Marital status: Single     Spouse name: Not on file    Number of children: Not on file    Years of education: Not on file    Highest education level: Not on file   Occupational History    Not on file   Social Needs    Financial resource strain: Not on file    Food insecurity     Worry: Not on file     Inability: Not on file    Transportation needs     Medical: Not on file     Non-medical: Not on file   Tobacco Use    Smoking status: Current Every Day Smoker     Packs/day: 0.50    Smokeless tobacco: Never Used   Substance and Sexual Activity    Alcohol use: No    Drug use: No    Sexual activity: Not on file   Lifestyle    Physical activity     Days per week: Not on file     Minutes per session: Not on file    Stress: Not on file   Relationships    Social connections     Talks on phone: Not on file     Gets together: Not on file     Attends [employer] service: Not on file     Active member of club or organization: Not on file     Attends meetings of clubs or organizations: Not on file     Relationship status: Not on file   Other Topics Concern    Not on file   Social History Narrative    Not on file      Date of surgery on 12/29   Preoperative diagnosis breast cancer  Postoperative diagnosis is same  Procedure performed: immediate bilateral breast reconstruction using implants  Placement of AlloDerm a left breast  Placement of AlloDerm right breast  Surgeon Babycos  Anesthesia general  Complications none  Drains times for  Blood loss minimal    Date of surgery 11/04/2020  Preoperative diagnosis breast cancer  2.partial mastectomy flap necrosis secondary to patient noncompliance  Procedure  1)Irrigation and debridement of mastectomy flap skin skin and subcutaneous tissue only measuring approximately 2 cm x 6 cm  2)left implant exchange.    3 Extensive superior capsulotomy left breast  4) removal alloderm  Surgeon Babycos  Anesthesia general  Complications none  Drains x1  Blood loss minimal.       Review of Systems: negative    Objective:     Physical Exam:  Vitals:    11/24/20 0950   BP: (!) 146/71   Pulse: 96       WD WN NAD  VSS  Normal resp effort  L breast - incision CDI, no erythema, surgically absent nipple, 4mm x 4mm area of maceration along vertical incision, no exposed implant, drain to bulb suction  R breast - incision CDI, no erythema or drainage, 1cm x 1cm open wound,  surgically absent nipple, no exposed implant        Assessment:       1. Surgery follow-up examination        Plan:   40 y.o. female status post B breast reconstruction   - Open wound of B breast clean, no exposed implant  - Will c/w local wound care using bacitracin and nonstick dressing on area of concern. Again we discussed that she is at a high risk of losing her implants and if the incision opens and implant becomes exposed, she will need to have this removed. Patient voiced understanding, Encouraged to avoid smoking and any nicotine containing products.   - Estimated return to work extended to 12/14/2020 with restrictions of no lifting, pushing or pulling >10 pounds  - Return to clinic in 1 week, appointment scheduled      All  questions were answered. The patient was advised to call the clinic with any questions or concerns prior to their next visit.           Luz Elena Caldwell PA-C  Plastic and Reconstruction Surgery Department  [phone] office

## 2020-12-02 ENCOUNTER — OFFICE VISIT (OUTPATIENT)
Dept: PLASTIC SURGERY | Facility: CLINIC | Age: 40
End: 2020-12-02
Payer: COMMERCIAL

## 2020-12-02 VITALS
HEIGHT: 67 IN | HEART RATE: 66 BPM | SYSTOLIC BLOOD PRESSURE: 133 MMHG | WEIGHT: 134.94 LBS | BODY MASS INDEX: 21.18 KG/M2 | DIASTOLIC BLOOD PRESSURE: 74 MMHG

## 2020-12-02 DIAGNOSIS — Z09 SURGERY FOLLOW-UP EXAMINATION: Primary | ICD-10-CM

## 2020-12-02 PROCEDURE — 1126F AMNT PAIN NOTED NONE PRSNT: CPT | Mod: S$GLB,,, | Performed by: SURGERY

## 2020-12-02 PROCEDURE — 99999 PR PBB SHADOW E&M-EST. PATIENT-LVL III: ICD-10-PCS | Mod: PBBFAC,,, | Performed by: SURGERY

## 2020-12-02 PROCEDURE — 1126F PR PAIN SEVERITY QUANTIFIED, NO PAIN PRESENT: ICD-10-PCS | Mod: S$GLB,,, | Performed by: SURGERY

## 2020-12-02 PROCEDURE — 99024 POSTOP FOLLOW-UP VISIT: CPT | Mod: S$GLB,,, | Performed by: SURGERY

## 2020-12-02 PROCEDURE — 99024 PR POST-OP FOLLOW-UP VISIT: ICD-10-PCS | Mod: S$GLB,,, | Performed by: SURGERY

## 2020-12-02 PROCEDURE — 99999 PR PBB SHADOW E&M-EST. PATIENT-LVL III: CPT | Mod: PBBFAC,,, | Performed by: SURGERY

## 2020-12-02 PROCEDURE — 3008F BODY MASS INDEX DOCD: CPT | Mod: CPTII,S$GLB,, | Performed by: SURGERY

## 2020-12-02 PROCEDURE — 3008F PR BODY MASS INDEX (BMI) DOCUMENTED: ICD-10-PCS | Mod: CPTII,S$GLB,, | Performed by: SURGERY

## 2020-12-02 NOTE — PROGRESS NOTES
Patient presents Plastic surgery Clinic after having bilateral breast reconstruction.  The patient had previously wrapped Coban and an Ace wrap around her breasts trapping the drainage to and causing necrosis of the mastectomy flap.  This resulted in the removal of the left tissue expander.  Patient is also a smoker.  She states that she has decreased her smoking to 9 cigarettes in the last 2 weeks.  She has approximately a 1 x 1 cm granulating wound of the right breast.  There is no exposed expander on this side.  Again I have encouraged her to stop smoking completely.  We will see her back in 2 weeks.

## 2020-12-10 LAB — FUNGUS SPEC CULT: NORMAL

## 2020-12-15 ENCOUNTER — PATIENT MESSAGE (OUTPATIENT)
Dept: ALLERGY | Facility: CLINIC | Age: 40
End: 2020-12-15

## 2020-12-15 ENCOUNTER — OFFICE VISIT (OUTPATIENT)
Dept: DERMATOLOGY | Facility: CLINIC | Age: 40
End: 2020-12-15
Payer: COMMERCIAL

## 2020-12-15 DIAGNOSIS — L81.4 LENTIGO: ICD-10-CM

## 2020-12-15 DIAGNOSIS — D22.9 MULTIPLE BENIGN NEVI: ICD-10-CM

## 2020-12-15 DIAGNOSIS — D48.5 NEOPLASM OF UNCERTAIN BEHAVIOR OF SKIN: Primary | ICD-10-CM

## 2020-12-15 DIAGNOSIS — Z12.83 SCREENING EXAM FOR SKIN CANCER: ICD-10-CM

## 2020-12-15 DIAGNOSIS — L82.1 SK (SEBORRHEIC KERATOSIS): ICD-10-CM

## 2020-12-15 PROCEDURE — 88305 TISSUE EXAM BY PATHOLOGIST: CPT | Mod: 26,,, | Performed by: PATHOLOGY

## 2020-12-15 PROCEDURE — 99203 OFFICE O/P NEW LOW 30 MIN: CPT | Mod: 25,S$GLB,, | Performed by: DERMATOLOGY

## 2020-12-15 PROCEDURE — 88342 IMHCHEM/IMCYTCHM 1ST ANTB: CPT | Mod: 26,,, | Performed by: PATHOLOGY

## 2020-12-15 PROCEDURE — 1126F PR PAIN SEVERITY QUANTIFIED, NO PAIN PRESENT: ICD-10-PCS | Mod: S$GLB,,, | Performed by: DERMATOLOGY

## 2020-12-15 PROCEDURE — 88305 TISSUE EXAM BY PATHOLOGIST: ICD-10-PCS | Mod: 26,,, | Performed by: PATHOLOGY

## 2020-12-15 PROCEDURE — 88305 TISSUE EXAM BY PATHOLOGIST: CPT | Performed by: PATHOLOGY

## 2020-12-15 PROCEDURE — 11102 TANGNTL BX SKIN SINGLE LES: CPT | Mod: S$GLB,,, | Performed by: DERMATOLOGY

## 2020-12-15 PROCEDURE — 99203 PR OFFICE/OUTPT VISIT, NEW, LEVL III, 30-44 MIN: ICD-10-PCS | Mod: 25,S$GLB,, | Performed by: DERMATOLOGY

## 2020-12-15 PROCEDURE — 99999 PR PBB SHADOW E&M-EST. PATIENT-LVL III: ICD-10-PCS | Mod: PBBFAC,,, | Performed by: DERMATOLOGY

## 2020-12-15 PROCEDURE — 99999 PR PBB SHADOW E&M-EST. PATIENT-LVL III: CPT | Mod: PBBFAC,,, | Performed by: DERMATOLOGY

## 2020-12-15 PROCEDURE — 88342 IMHCHEM/IMCYTCHM 1ST ANTB: CPT | Performed by: PATHOLOGY

## 2020-12-15 PROCEDURE — 88342 CHG IMMUNOCYTOCHEMISTRY: ICD-10-PCS | Mod: 26,,, | Performed by: PATHOLOGY

## 2020-12-15 PROCEDURE — 1126F AMNT PAIN NOTED NONE PRSNT: CPT | Mod: S$GLB,,, | Performed by: DERMATOLOGY

## 2020-12-15 PROCEDURE — 11102 PR TANGENTIAL BIOPSY, SKIN, SINGLE LESION: ICD-10-PCS | Mod: S$GLB,,, | Performed by: DERMATOLOGY

## 2020-12-15 NOTE — PROGRESS NOTES
Subjective:       Patient ID:  Alton Bach is a 40 y.o. female who presents for   Chief Complaint   Patient presents with    Skin Check     mole      Patient here for Total Body Skin Exam    no - personal history of MM  no - family history of MM  no - childhood blistering sunburns  no - tanning bed use    Patient with new complaint of lesion(s)  Location: left upper back   Duration: 6 mo   Symptoms: color, texture  Relieving factors/Previous treatments: none          Review of Systems   Skin: Negative for daily sunscreen use, activity-related sunscreen use and recent sunburn.   Hematologic/Lymphatic: Does not bruise/bleed easily.        Objective:    Physical Exam   Constitutional: She appears well-developed and well-nourished. No distress.   Neurological: She is alert and oriented to person, place, and time. She is not disoriented.   Psychiatric: She has a normal mood and affect.   Skin:   Areas Examined (abnormalities noted in diagram):   Scalp / Hair Palpated and Inspected  Head / Face Inspection Performed  Neck Inspection Performed  Chest / Axilla Inspection Performed  Abdomen Inspection Performed  Genitals / Buttocks / Groin Inspection Performed  Back Inspection Performed  RUE Inspected  LUE Inspection Performed  RLE Inspected  LLE Inspection Performed  Nails and Digits Inspection Performed                   Diagram Legend     Erythematous scaling macule/papule c/w actinic keratosis       Vascular papule c/w angioma      Pigmented verrucoid papule/plaque c/w seborrheic keratosis      Yellow umbilicated papule c/w sebaceous hyperplasia      Irregularly shaped tan macule c/w lentigo     1-2 mm smooth white papules consistent with Milia      Movable subcutaneous cyst with punctum c/w epidermal inclusion cyst      Subcutaneous movable cyst c/w pilar cyst      Firm pink to brown papule c/w dermatofibroma      Pedunculated fleshy papule(s) c/w skin tag(s)      Evenly pigmented macule c/w junctional  nevus     Mildly variegated pigmented, slightly irregular-bordered macule c/w mildly atypical nevus      Flesh colored to evenly pigmented papule c/w intradermal nevus       Pink pearly papule/plaque c/w basal cell carcinoma      Erythematous hyperkeratotic cursted plaque c/w SCC      Surgical scar with no sign of skin cancer recurrence      Open and closed comedones      Inflammatory papules and pustules      Verrucoid papule consistent consistent with wart     Erythematous eczematous patches and plaques     Dystrophic onycholytic nail with subungual debris c/w onychomycosis     Umbilicated papule    Erythematous-base heme-crusted tan verrucoid plaque consistent with inflamed seborrheic keratosis     Erythematous Silvery Scaling Plaque c/w Psoriasis     See annotation                  Assessment / Plan:      Pathology Orders:     Normal Orders This Visit    Specimen to Pathology, Dermatology     Questions:    Procedure Type: Dermatology and skin neoplasms    Number of Specimens: 1    ------------------------: -------------------------    Spec 1 Procedure: Biopsy    Spec 1 Clinical Impression: r/o atypical lentigo    Spec 1 Source: left medial shoulder        Neoplasm of uncertain behavior of skin  -     Specimen to Pathology, Dermatology  Shave biopsy procedure note:    Shave biopsy performed after verbal consent including risk of infection, scar, recurrence, need for additional treatment of site. Area prepped with alcohol, anesthetized with approximately 1.0cc of 1% lidocaine with epinephrine. Lesional tissue shaved with razor blade. Hemostasis achieved with application of aluminum chloride followed by hyfrecation. No complications. Dressing applied. Wound care explained.    SK (seborrheic keratosis)  These are benign inherited growths without a malignant potential. Reassurance given to patient. No treatment is necessary.     Multiple benign nevi  Total body skin examination performed today including at least 12  points as noted in physical examination. 1 lesion suspicious for malignancy noted.  Reassurance provided.  Instructed patient to observe lesion(s) for changes and follow up in clinic if changes are noted. Discussed ABCDE's of moles and brochure provided.    Lentigo  This is a benign hyperpigmented sun induced lesion. Daily sun protection will reduce the number of new lesions. Treatment of these benign lesions are considered cosmetic.    Screening exam for skin cancer  Total body skin examination performed today including at least 12 points as noted in physical examination. Suspicious lesions noted.           Follow up if symptoms worsen or fail to improve.

## 2020-12-15 NOTE — PATIENT INSTRUCTIONS
Shave Biopsy Wound Care    Your doctor has performed a shave biopsy today.  A band aid and vaseline ointment has been placed over the site.  This should remain in place for 24 hours.  It is recommended that you keep the area dry for the first 24 hours.  After 24 hours, you may remove the band aid and wash the area with warm soap and water and apply Vaseline jelly.  Many patients prefer to use Neosporin or Bacitracin ointment.  This is acceptable; however, know that you can develop an allergy to this medication even if you have used it safely for years.  It is important to keep the area moist.  Letting it dry out and get air slows healing time, and will worsen the scar.  Band aid is optional after first 24 hours.      If you notice increasing redness, tenderness, pain, or yellow drainage at the biopsy site, please notify your doctor.  These are signs of an infection.    If your biopsy site is bleeding, apply firm pressure for 15 minutes straight.  Repeat for another 15 minutes, if it is still bleeding.   If the surgical site continues to bleed, then please contact your doctor.      For [facility] users:   You will receive your biopsy results in [facility] as soon as they are available. Please be assured that your physician/provider will review your results and will then determine what further treatment, evaluation, or planning is required. You should be contacted by your physician's/provider's office within 5 business days of receiving your results; If not, please reach out to directly. This is one more way [facility] is putting you first.     [address] [facility] [place] [address]/ [phone] FAX/ [email or web address]

## 2020-12-16 ENCOUNTER — OFFICE VISIT (OUTPATIENT)
Dept: PLASTIC SURGERY | Facility: CLINIC | Age: 40
End: 2020-12-16
Payer: COMMERCIAL

## 2020-12-16 VITALS
DIASTOLIC BLOOD PRESSURE: 74 MMHG | HEART RATE: 94 BPM | HEIGHT: 67 IN | SYSTOLIC BLOOD PRESSURE: 127 MMHG | BODY MASS INDEX: 21.18 KG/M2 | WEIGHT: 134.94 LBS

## 2020-12-16 DIAGNOSIS — Z09 SURGERY FOLLOW-UP EXAMINATION: Primary | ICD-10-CM

## 2020-12-16 PROCEDURE — 99024 POSTOP FOLLOW-UP VISIT: CPT | Mod: S$GLB,,, | Performed by: PHYSICIAN ASSISTANT

## 2020-12-16 PROCEDURE — 99999 PR PBB SHADOW E&M-EST. PATIENT-LVL III: CPT | Mod: PBBFAC,,, | Performed by: PHYSICIAN ASSISTANT

## 2020-12-16 PROCEDURE — 99999 PR PBB SHADOW E&M-EST. PATIENT-LVL III: ICD-10-PCS | Mod: PBBFAC,,, | Performed by: PHYSICIAN ASSISTANT

## 2020-12-16 PROCEDURE — 1126F AMNT PAIN NOTED NONE PRSNT: CPT | Mod: S$GLB,,, | Performed by: PHYSICIAN ASSISTANT

## 2020-12-16 PROCEDURE — 3008F PR BODY MASS INDEX (BMI) DOCUMENTED: ICD-10-PCS | Mod: CPTII,S$GLB,, | Performed by: PHYSICIAN ASSISTANT

## 2020-12-16 PROCEDURE — 99024 PR POST-OP FOLLOW-UP VISIT: ICD-10-PCS | Mod: S$GLB,,, | Performed by: PHYSICIAN ASSISTANT

## 2020-12-16 PROCEDURE — 3008F BODY MASS INDEX DOCD: CPT | Mod: CPTII,S$GLB,, | Performed by: PHYSICIAN ASSISTANT

## 2020-12-16 PROCEDURE — 1126F PR PAIN SEVERITY QUANTIFIED, NO PAIN PRESENT: ICD-10-PCS | Mod: S$GLB,,, | Performed by: PHYSICIAN ASSISTANT

## 2020-12-16 NOTE — PROGRESS NOTES
Subjective:      Alton Bach is a 40 y.o. year old female who presents to the Plastic Surgery Clinic on 12/16/2020 for follow up visit status post B breast reconstruction with direct silicone implant placement on 10/12/20 with subsequent return to OR for removal of nonviable skin of left breast and exchange of implant to tissue expander. She has been applying bacitracin ointment to two areas of concern on L breast and one area on R breast which she appears to be having a reaction to the ointment. Denies fever, chills, nausea, vomiting, or other systemic signs of infection.    Vitals:    12/16/20 0832   BP: 127/74   Pulse: 94        Review of patient's allergies indicates:   Allergen Reactions    Coconut Swelling and Rash    Dye Swelling and Blisters     Hair Dye    Strawberry Swelling and Rash       Current Outpatient Medications on File Prior to Visit   Medication Sig Dispense Refill    HYDROcodone-acetaminophen (NORCO) 5-325 mg per tablet Take 1 tablet by mouth every 4 (four) hours as needed for Pain. 16 tablet 0    ibuprofen (ADVIL,MOTRIN) 600 MG tablet Take 1 tablet (600 mg total) by mouth 3 (three) times daily. 30 tablet 0    multivitamin (ONE DAILY MULTIVITAMIN) per tablet Take 1 tablet by mouth once daily.      oxyCODONE-acetaminophen (PERCOCET) 5-325 mg per tablet Take 1 tablet by mouth every 6 (six) hours as needed for Pain. 43 tablet 0     Current Facility-Administered Medications on File Prior to Visit   Medication Dose Route Frequency Provider Last Rate Last Dose    heparin (porcine) injection 5,000 Units  5,000 Units Subcutaneous Once Karen Garcia MD        sodium chloride 0.9% flush 10 mL  10 mL Intravenous PRN Karen Garcia MD           Patient Active Problem List   Diagnosis    Groin strain    Ductal carcinoma in situ (DCIS) of left breast    Breast cancer       Past Surgical History:   Procedure Laterality Date    breast augmentation      BREAST BIOPSY Right      CHOLECYSTECTOMY      MASTECTOMY Bilateral 10/12/2020    Procedure: MASTECTOMY-Bilateral ;  Surgeon: Anahi Huitron MD;  Location: [address];  Service: General;  Laterality: Bilateral;    REPLACEMENT OF IMPLANT OF BREAST Bilateral 10/12/2020    Procedure: REPLACEMENT, IMPLANT, BREAST-Bilateral;  Surgeon: Haresh Porter MD;  Location: [address];  Service: Plastics;  Laterality: Bilateral;    REPLACEMENT OF IMPLANT OF BREAST Left 11/4/2020    Procedure: Left Breast Implant Exchange;  Surgeon: Haresh Porter MD;  Location: [address];  Service: Plastics;  Laterality: Left;    SENTINEL LYMPH NODE BIOPSY Bilateral 10/12/2020    Procedure: BIOPSY, LYMPH NODE, SENTINEL-Bilateral;  Surgeon: Anahi Huitron MD;  Location: [address];  Service: General;  Laterality: Bilateral;       Social History     Socioeconomic History    Marital status: Single     Spouse name: Not on file    Number of children: Not on file    Years of education: Not on file    Highest education level: Not on file   Occupational History    Not on file   Social Needs    Financial resource strain: Not on file    Food insecurity     Worry: Not on file     Inability: Not on file    Transportation needs     Medical: Not on file     Non-medical: Not on file   Tobacco Use    Smoking status: Current Every Day Smoker     Packs/day: 0.50    Smokeless tobacco: Never Used   Substance and Sexual Activity    Alcohol use: No    Drug use: No    Sexual activity: Not on file   Lifestyle    Physical activity     Days per week: Not on file     Minutes per session: Not on file    Stress: Not on file   Relationships    Social connections     Talks on phone: Not on file     Gets together: Not on file     Attends Confucianist service: Not on file     Active member of club or organization: Not on file     Attends meetings of clubs or organizations: Not on file     Relationship status: Not on file   Other Topics Concern    Not  on file   Social History Narrative    Not on file     Date of surgery on 12/29   Preoperative diagnosis breast cancer  Postoperative diagnosis is same  Procedure performed: immediate bilateral breast reconstruction using implants  Placement of AlloDerm a left breast  Placement of AlloDerm right breast  Surgeon Babycos  Anesthesia general  Complications none  Drains times for  Blood loss minimal    Date of surgery 11/04/2020  Preoperative diagnosis breast cancer  2.partial mastectomy flap necrosis secondary to patient noncompliance  Procedure  1)Irrigation and debridement of mastectomy flap skin skin and subcutaneous tissue only measuring approximately 2 cm x 6 cm  2)left implant exchange.    3 Extensive superior capsulotomy left breast  4) removal alloderm  Surgeon Babycos  Anesthesia general  Complications none  Drains x1  Blood loss minimal.       Review of Systems: negative    Objective:     Physical Exam:  Vitals:    12/16/20 0832   BP: 127/74   Pulse: 94       WD WN NAD  VSS  Normal resp effort  L breast - incision CDI, no erythema, surgically absent nipple, + rash, no exposed implant  R breast - incision CDI, no erythema or drainage, 1cm x 1cm open wound,  surgically absent nipple, no exposed implant, + rash        Assessment:       1. Surgery follow-up examination        Plan:   40 y.o. female status post B breast reconstruction   - + rash of B breast likely secondary to Bacitracin ointment, will d/c and use AquaPhor to open area  - No exposed implant at this time  - Patient does admit to smoking, encouraged to stop  - Return to clinic in 2 weeks, appointment scheduled      All questions were answered. The patient was advised to call the clinic with any questions or concerns prior to their next visit.           Luz Elena Caldwell PA-C  Plastic and Reconstruction Surgery Department  [phone] office

## 2020-12-19 LAB
FINAL PATHOLOGIC DIAGNOSIS: NORMAL
GROSS: NORMAL
MICROSCOPIC EXAM: NORMAL

## 2021-01-05 ENCOUNTER — OFFICE VISIT (OUTPATIENT)
Dept: ALLERGY | Facility: CLINIC | Age: 41
End: 2021-01-05
Payer: COMMERCIAL

## 2021-01-05 VITALS — WEIGHT: 134.5 LBS | BODY MASS INDEX: 21.11 KG/M2 | HEIGHT: 67 IN

## 2021-01-05 DIAGNOSIS — T50.905A DRUG REACTION, INITIAL ENCOUNTER: Primary | ICD-10-CM

## 2021-01-05 PROCEDURE — 99204 PR OFFICE/OUTPT VISIT, NEW, LEVL IV, 45-59 MIN: ICD-10-PCS | Mod: S$GLB,,, | Performed by: STUDENT IN AN ORGANIZED HEALTH CARE EDUCATION/TRAINING PROGRAM

## 2021-01-05 PROCEDURE — 99999 PR PBB SHADOW E&M-EST. PATIENT-LVL III: ICD-10-PCS | Mod: PBBFAC,,, | Performed by: STUDENT IN AN ORGANIZED HEALTH CARE EDUCATION/TRAINING PROGRAM

## 2021-01-05 PROCEDURE — 99999 PR PBB SHADOW E&M-EST. PATIENT-LVL III: CPT | Mod: PBBFAC,,, | Performed by: STUDENT IN AN ORGANIZED HEALTH CARE EDUCATION/TRAINING PROGRAM

## 2021-01-05 PROCEDURE — 99204 OFFICE O/P NEW MOD 45 MIN: CPT | Mod: S$GLB,,, | Performed by: STUDENT IN AN ORGANIZED HEALTH CARE EDUCATION/TRAINING PROGRAM

## 2021-01-06 ENCOUNTER — OFFICE VISIT (OUTPATIENT)
Dept: PLASTIC SURGERY | Facility: CLINIC | Age: 41
End: 2021-01-06
Payer: COMMERCIAL

## 2021-01-06 ENCOUNTER — PATIENT MESSAGE (OUTPATIENT)
Dept: PLASTIC SURGERY | Facility: CLINIC | Age: 41
End: 2021-01-06

## 2021-01-06 VITALS
HEART RATE: 85 BPM | DIASTOLIC BLOOD PRESSURE: 85 MMHG | WEIGHT: 134 LBS | SYSTOLIC BLOOD PRESSURE: 123 MMHG | BODY MASS INDEX: 20.99 KG/M2

## 2021-01-06 DIAGNOSIS — Z09 SURGERY FOLLOW-UP EXAMINATION: Primary | ICD-10-CM

## 2021-01-06 LAB
ACID FAST MOD KINY STN SPEC: NORMAL
MYCOBACTERIUM SPEC QL CULT: NORMAL

## 2021-01-06 PROCEDURE — 1126F PR PAIN SEVERITY QUANTIFIED, NO PAIN PRESENT: ICD-10-PCS | Mod: S$GLB,,, | Performed by: PHYSICIAN ASSISTANT

## 2021-01-06 PROCEDURE — 99024 POSTOP FOLLOW-UP VISIT: CPT | Mod: S$GLB,,, | Performed by: PHYSICIAN ASSISTANT

## 2021-01-06 PROCEDURE — 99024 PR POST-OP FOLLOW-UP VISIT: ICD-10-PCS | Mod: S$GLB,,, | Performed by: PHYSICIAN ASSISTANT

## 2021-01-06 PROCEDURE — 3008F BODY MASS INDEX DOCD: CPT | Mod: CPTII,S$GLB,, | Performed by: PHYSICIAN ASSISTANT

## 2021-01-06 PROCEDURE — 99999 PR PBB SHADOW E&M-EST. PATIENT-LVL III: CPT | Mod: PBBFAC,,, | Performed by: PHYSICIAN ASSISTANT

## 2021-01-06 PROCEDURE — 1126F AMNT PAIN NOTED NONE PRSNT: CPT | Mod: S$GLB,,, | Performed by: PHYSICIAN ASSISTANT

## 2021-01-06 PROCEDURE — 99999 PR PBB SHADOW E&M-EST. PATIENT-LVL III: ICD-10-PCS | Mod: PBBFAC,,, | Performed by: PHYSICIAN ASSISTANT

## 2021-01-06 PROCEDURE — 3008F PR BODY MASS INDEX (BMI) DOCUMENTED: ICD-10-PCS | Mod: CPTII,S$GLB,, | Performed by: PHYSICIAN ASSISTANT

## 2021-01-15 ENCOUNTER — OFFICE VISIT (OUTPATIENT)
Dept: FAMILY MEDICINE | Facility: CLINIC | Age: 41
End: 2021-01-15
Payer: COMMERCIAL

## 2021-01-15 VITALS
DIASTOLIC BLOOD PRESSURE: 72 MMHG | HEIGHT: 67 IN | SYSTOLIC BLOOD PRESSURE: 120 MMHG | TEMPERATURE: 98 F | BODY MASS INDEX: 21.12 KG/M2 | OXYGEN SATURATION: 97 % | HEART RATE: 89 BPM | RESPIRATION RATE: 16 BRPM | WEIGHT: 134.56 LBS

## 2021-01-15 DIAGNOSIS — F17.200 TOBACCO DEPENDENCE: ICD-10-CM

## 2021-01-15 DIAGNOSIS — R22.41 MASS OF RIGHT THIGH: Primary | ICD-10-CM

## 2021-01-15 PROCEDURE — 99213 PR OFFICE/OUTPT VISIT, EST, LEVL III, 20-29 MIN: ICD-10-PCS | Mod: S$GLB,,, | Performed by: FAMILY MEDICINE

## 2021-01-15 PROCEDURE — 3008F BODY MASS INDEX DOCD: CPT | Mod: CPTII,S$GLB,, | Performed by: FAMILY MEDICINE

## 2021-01-15 PROCEDURE — 99213 OFFICE O/P EST LOW 20 MIN: CPT | Mod: S$GLB,,, | Performed by: FAMILY MEDICINE

## 2021-01-15 PROCEDURE — 99999 PR PBB SHADOW E&M-EST. PATIENT-LVL IV: ICD-10-PCS | Mod: PBBFAC,,, | Performed by: FAMILY MEDICINE

## 2021-01-15 PROCEDURE — 99999 PR PBB SHADOW E&M-EST. PATIENT-LVL IV: CPT | Mod: PBBFAC,,, | Performed by: FAMILY MEDICINE

## 2021-01-15 PROCEDURE — 3008F PR BODY MASS INDEX (BMI) DOCUMENTED: ICD-10-PCS | Mod: CPTII,S$GLB,, | Performed by: FAMILY MEDICINE

## 2021-01-15 PROCEDURE — 1125F PR PAIN SEVERITY QUANTIFIED, PAIN PRESENT: ICD-10-PCS | Mod: S$GLB,,, | Performed by: FAMILY MEDICINE

## 2021-01-15 PROCEDURE — 1125F AMNT PAIN NOTED PAIN PRSNT: CPT | Mod: S$GLB,,, | Performed by: FAMILY MEDICINE

## 2021-01-15 RX ORDER — IBUPROFEN 200 MG
1 TABLET ORAL DAILY
Qty: 14 PATCH | COMMUNITY
Start: 2021-01-15 | End: 2021-01-29

## 2021-01-15 RX ORDER — NICOTINE 7MG/24HR
1 PATCH, TRANSDERMAL 24 HOURS TRANSDERMAL DAILY
Qty: 14 PATCH | COMMUNITY
Start: 2021-01-15 | End: 2021-01-29

## 2021-01-15 RX ORDER — MULTIVIT WITH MINERALS/HERBS
1 TABLET ORAL DAILY
COMMUNITY
End: 2021-12-02 | Stop reason: SDUPTHER

## 2021-01-21 ENCOUNTER — HOSPITAL ENCOUNTER (OUTPATIENT)
Dept: RADIOLOGY | Facility: HOSPITAL | Age: 41
Discharge: HOME | End: 2021-01-21
Attending: FAMILY MEDICINE
Payer: COMMERCIAL

## 2021-01-21 DIAGNOSIS — R22.41 MASS OF RIGHT THIGH: ICD-10-CM

## 2021-01-21 PROCEDURE — 76882 US EXTREMITY NON VASCULAR LIMITED RIGHT: ICD-10-PCS | Mod: 26,RT,, | Performed by: RADIOLOGY

## 2021-01-21 PROCEDURE — 76882 US LMTD JT/FCL EVL NVASC XTR: CPT | Mod: 26,RT,, | Performed by: RADIOLOGY

## 2021-01-21 PROCEDURE — 76882 US LMTD JT/FCL EVL NVASC XTR: CPT | Mod: TC,RT

## 2021-01-27 ENCOUNTER — OFFICE VISIT (OUTPATIENT)
Dept: PLASTIC SURGERY | Facility: CLINIC | Age: 41
End: 2021-01-27
Payer: COMMERCIAL

## 2021-01-27 VITALS
HEART RATE: 85 BPM | HEIGHT: 67 IN | DIASTOLIC BLOOD PRESSURE: 73 MMHG | BODY MASS INDEX: 21.03 KG/M2 | SYSTOLIC BLOOD PRESSURE: 114 MMHG | WEIGHT: 134 LBS

## 2021-01-27 DIAGNOSIS — Z09 SURGERY FOLLOW-UP EXAMINATION: Primary | ICD-10-CM

## 2021-01-27 PROCEDURE — 3008F PR BODY MASS INDEX (BMI) DOCUMENTED: ICD-10-PCS | Mod: CPTII,S$GLB,, | Performed by: PHYSICIAN ASSISTANT

## 2021-01-27 PROCEDURE — 3008F BODY MASS INDEX DOCD: CPT | Mod: CPTII,S$GLB,, | Performed by: PHYSICIAN ASSISTANT

## 2021-01-27 PROCEDURE — 99999 PR PBB SHADOW E&M-EST. PATIENT-LVL III: ICD-10-PCS | Mod: PBBFAC,,, | Performed by: PHYSICIAN ASSISTANT

## 2021-01-27 PROCEDURE — 1126F AMNT PAIN NOTED NONE PRSNT: CPT | Mod: S$GLB,,, | Performed by: PHYSICIAN ASSISTANT

## 2021-01-27 PROCEDURE — 99024 PR POST-OP FOLLOW-UP VISIT: ICD-10-PCS | Mod: S$GLB,,, | Performed by: PHYSICIAN ASSISTANT

## 2021-01-27 PROCEDURE — 1126F PR PAIN SEVERITY QUANTIFIED, NO PAIN PRESENT: ICD-10-PCS | Mod: S$GLB,,, | Performed by: PHYSICIAN ASSISTANT

## 2021-01-27 PROCEDURE — 99999 PR PBB SHADOW E&M-EST. PATIENT-LVL III: CPT | Mod: PBBFAC,,, | Performed by: PHYSICIAN ASSISTANT

## 2021-01-27 PROCEDURE — 99024 POSTOP FOLLOW-UP VISIT: CPT | Mod: S$GLB,,, | Performed by: PHYSICIAN ASSISTANT

## 2021-02-10 ENCOUNTER — OFFICE VISIT (OUTPATIENT)
Dept: PLASTIC SURGERY | Facility: CLINIC | Age: 41
End: 2021-02-10
Payer: COMMERCIAL

## 2021-02-10 VITALS
WEIGHT: 134.06 LBS | HEART RATE: 80 BPM | HEIGHT: 67 IN | SYSTOLIC BLOOD PRESSURE: 134 MMHG | DIASTOLIC BLOOD PRESSURE: 69 MMHG | BODY MASS INDEX: 21.04 KG/M2

## 2021-02-10 DIAGNOSIS — Z09 SURGERY FOLLOW-UP EXAMINATION: Primary | ICD-10-CM

## 2021-02-10 PROCEDURE — 1126F AMNT PAIN NOTED NONE PRSNT: CPT | Mod: S$GLB,,, | Performed by: PHYSICIAN ASSISTANT

## 2021-02-10 PROCEDURE — 3008F BODY MASS INDEX DOCD: CPT | Mod: CPTII,S$GLB,, | Performed by: PHYSICIAN ASSISTANT

## 2021-02-10 PROCEDURE — 3008F PR BODY MASS INDEX (BMI) DOCUMENTED: ICD-10-PCS | Mod: CPTII,S$GLB,, | Performed by: PHYSICIAN ASSISTANT

## 2021-02-10 PROCEDURE — 1126F PR PAIN SEVERITY QUANTIFIED, NO PAIN PRESENT: ICD-10-PCS | Mod: S$GLB,,, | Performed by: PHYSICIAN ASSISTANT

## 2021-02-10 PROCEDURE — 99212 PR OFFICE/OUTPT VISIT, EST, LEVL II, 10-19 MIN: ICD-10-PCS | Mod: S$GLB,,, | Performed by: PHYSICIAN ASSISTANT

## 2021-02-10 PROCEDURE — 99999 PR PBB SHADOW E&M-EST. PATIENT-LVL III: ICD-10-PCS | Mod: PBBFAC,,, | Performed by: PHYSICIAN ASSISTANT

## 2021-02-10 PROCEDURE — 99999 PR PBB SHADOW E&M-EST. PATIENT-LVL III: CPT | Mod: PBBFAC,,, | Performed by: PHYSICIAN ASSISTANT

## 2021-02-10 PROCEDURE — 99212 OFFICE O/P EST SF 10 MIN: CPT | Mod: S$GLB,,, | Performed by: PHYSICIAN ASSISTANT

## 2021-02-11 DIAGNOSIS — Z11.59 NEED FOR HEPATITIS C SCREENING TEST: ICD-10-CM

## 2021-02-24 ENCOUNTER — OFFICE VISIT (OUTPATIENT)
Dept: PLASTIC SURGERY | Facility: CLINIC | Age: 41
End: 2021-02-24
Payer: COMMERCIAL

## 2021-02-24 VITALS
SYSTOLIC BLOOD PRESSURE: 118 MMHG | HEIGHT: 67 IN | DIASTOLIC BLOOD PRESSURE: 57 MMHG | WEIGHT: 134.06 LBS | BODY MASS INDEX: 21.04 KG/M2 | HEART RATE: 94 BPM

## 2021-02-24 DIAGNOSIS — Z09 SURGERY FOLLOW-UP EXAMINATION: Primary | ICD-10-CM

## 2021-02-24 PROCEDURE — 99999 PR PBB SHADOW E&M-EST. PATIENT-LVL III: CPT | Mod: PBBFAC,,, | Performed by: PHYSICIAN ASSISTANT

## 2021-02-24 PROCEDURE — 3008F PR BODY MASS INDEX (BMI) DOCUMENTED: ICD-10-PCS | Mod: CPTII,S$GLB,, | Performed by: PHYSICIAN ASSISTANT

## 2021-02-24 PROCEDURE — 3008F BODY MASS INDEX DOCD: CPT | Mod: CPTII,S$GLB,, | Performed by: PHYSICIAN ASSISTANT

## 2021-02-24 PROCEDURE — 99024 PR POST-OP FOLLOW-UP VISIT: ICD-10-PCS | Mod: S$GLB,,, | Performed by: PHYSICIAN ASSISTANT

## 2021-02-24 PROCEDURE — 1126F PR PAIN SEVERITY QUANTIFIED, NO PAIN PRESENT: ICD-10-PCS | Mod: S$GLB,,, | Performed by: PHYSICIAN ASSISTANT

## 2021-02-24 PROCEDURE — 99024 POSTOP FOLLOW-UP VISIT: CPT | Mod: S$GLB,,, | Performed by: PHYSICIAN ASSISTANT

## 2021-02-24 PROCEDURE — 1126F AMNT PAIN NOTED NONE PRSNT: CPT | Mod: S$GLB,,, | Performed by: PHYSICIAN ASSISTANT

## 2021-02-24 PROCEDURE — 99999 PR PBB SHADOW E&M-EST. PATIENT-LVL III: ICD-10-PCS | Mod: PBBFAC,,, | Performed by: PHYSICIAN ASSISTANT

## 2021-03-23 ENCOUNTER — OFFICE VISIT (OUTPATIENT)
Dept: PLASTIC SURGERY | Facility: CLINIC | Age: 41
End: 2021-03-23
Payer: COMMERCIAL

## 2021-03-23 VITALS — HEIGHT: 67 IN | WEIGHT: 134.06 LBS | BODY MASS INDEX: 21.04 KG/M2

## 2021-03-23 DIAGNOSIS — Z09 SURGERY FOLLOW-UP EXAMINATION: Primary | ICD-10-CM

## 2021-03-23 PROCEDURE — 1125F AMNT PAIN NOTED PAIN PRSNT: CPT | Mod: S$GLB,,, | Performed by: PHYSICIAN ASSISTANT

## 2021-03-23 PROCEDURE — 99999 PR PBB SHADOW E&M-EST. PATIENT-LVL III: CPT | Mod: PBBFAC,,, | Performed by: PHYSICIAN ASSISTANT

## 2021-03-23 PROCEDURE — 1125F PR PAIN SEVERITY QUANTIFIED, PAIN PRESENT: ICD-10-PCS | Mod: S$GLB,,, | Performed by: PHYSICIAN ASSISTANT

## 2021-03-23 PROCEDURE — 3008F BODY MASS INDEX DOCD: CPT | Mod: CPTII,S$GLB,, | Performed by: PHYSICIAN ASSISTANT

## 2021-03-23 PROCEDURE — 3008F PR BODY MASS INDEX (BMI) DOCUMENTED: ICD-10-PCS | Mod: CPTII,S$GLB,, | Performed by: PHYSICIAN ASSISTANT

## 2021-03-23 PROCEDURE — 99212 OFFICE O/P EST SF 10 MIN: CPT | Mod: S$GLB,,, | Performed by: PHYSICIAN ASSISTANT

## 2021-03-23 PROCEDURE — 99999 PR PBB SHADOW E&M-EST. PATIENT-LVL III: ICD-10-PCS | Mod: PBBFAC,,, | Performed by: PHYSICIAN ASSISTANT

## 2021-03-23 PROCEDURE — 99212 PR OFFICE/OUTPT VISIT, EST, LEVL II, 10-19 MIN: ICD-10-PCS | Mod: S$GLB,,, | Performed by: PHYSICIAN ASSISTANT

## 2021-04-05 ENCOUNTER — PATIENT MESSAGE (OUTPATIENT)
Dept: ADMINISTRATIVE | Facility: HOSPITAL | Age: 41
End: 2021-04-05

## 2021-04-15 ENCOUNTER — PATIENT MESSAGE (OUTPATIENT)
Dept: RESEARCH | Facility: HOSPITAL | Age: 41
End: 2021-04-15

## 2021-06-18 ENCOUNTER — TELEPHONE (OUTPATIENT)
Dept: SURGERY | Facility: CLINIC | Age: 41
End: 2021-06-18

## 2021-07-23 ENCOUNTER — HOSPITAL ENCOUNTER (OUTPATIENT)
Dept: RADIOLOGY | Facility: HOSPITAL | Age: 41
Discharge: HOME | End: 2021-07-23
Attending: SURGERY
Payer: COMMERCIAL

## 2021-07-23 ENCOUNTER — OFFICE VISIT (OUTPATIENT)
Dept: SURGERY | Facility: CLINIC | Age: 41
End: 2021-07-23
Payer: COMMERCIAL

## 2021-07-23 VITALS
SYSTOLIC BLOOD PRESSURE: 127 MMHG | HEART RATE: 85 BPM | HEIGHT: 67 IN | WEIGHT: 134 LBS | BODY MASS INDEX: 21.03 KG/M2 | DIASTOLIC BLOOD PRESSURE: 76 MMHG

## 2021-07-23 DIAGNOSIS — Z90.13 STATUS POST MASTECTOMY, BILATERAL: Primary | ICD-10-CM

## 2021-07-23 DIAGNOSIS — Z90.13 STATUS POST MASTECTOMY, BILATERAL: ICD-10-CM

## 2021-07-23 PROCEDURE — 76642 ULTRASOUND BREAST LIMITED: CPT | Mod: 26,RT,, | Performed by: RADIOLOGY

## 2021-07-23 PROCEDURE — 99999 PR PBB SHADOW E&M-EST. PATIENT-LVL III: CPT | Mod: PBBFAC,,, | Performed by: SURGERY

## 2021-07-23 PROCEDURE — 1126F PR PAIN SEVERITY QUANTIFIED, NO PAIN PRESENT: ICD-10-PCS | Mod: CPTII,S$GLB,, | Performed by: SURGERY

## 2021-07-23 PROCEDURE — 3008F PR BODY MASS INDEX (BMI) DOCUMENTED: ICD-10-PCS | Mod: CPTII,S$GLB,, | Performed by: SURGERY

## 2021-07-23 PROCEDURE — 76642 ULTRASOUND BREAST LIMITED: CPT | Mod: TC,RT

## 2021-07-23 PROCEDURE — 76642 US BREAST RIGHT LIMITED: ICD-10-PCS | Mod: 26,RT,, | Performed by: RADIOLOGY

## 2021-07-23 PROCEDURE — 1126F AMNT PAIN NOTED NONE PRSNT: CPT | Mod: CPTII,S$GLB,, | Performed by: SURGERY

## 2021-07-23 PROCEDURE — 99213 PR OFFICE/OUTPT VISIT, EST, LEVL III, 20-29 MIN: ICD-10-PCS | Mod: S$GLB,,, | Performed by: SURGERY

## 2021-07-23 PROCEDURE — 3008F BODY MASS INDEX DOCD: CPT | Mod: CPTII,S$GLB,, | Performed by: SURGERY

## 2021-07-23 PROCEDURE — 99213 OFFICE O/P EST LOW 20 MIN: CPT | Mod: S$GLB,,, | Performed by: SURGERY

## 2021-07-23 PROCEDURE — 99999 PR PBB SHADOW E&M-EST. PATIENT-LVL III: ICD-10-PCS | Mod: PBBFAC,,, | Performed by: SURGERY

## 2021-11-24 ENCOUNTER — TELEPHONE (OUTPATIENT)
Dept: FAMILY MEDICINE | Facility: CLINIC | Age: 41
End: 2021-11-24
Payer: COMMERCIAL

## 2021-12-02 ENCOUNTER — OFFICE VISIT (OUTPATIENT)
Dept: FAMILY MEDICINE | Facility: CLINIC | Age: 41
End: 2021-12-02
Payer: COMMERCIAL

## 2021-12-02 VITALS
SYSTOLIC BLOOD PRESSURE: 122 MMHG | HEART RATE: 94 BPM | DIASTOLIC BLOOD PRESSURE: 80 MMHG | WEIGHT: 140.31 LBS | OXYGEN SATURATION: 98 % | TEMPERATURE: 99 F | HEIGHT: 67 IN | BODY MASS INDEX: 22.02 KG/M2

## 2021-12-02 DIAGNOSIS — Z00.00 ROUTINE PHYSICAL EXAMINATION: Primary | ICD-10-CM

## 2021-12-02 DIAGNOSIS — R03.0 ELEVATED BLOOD PRESSURE, SITUATIONAL: ICD-10-CM

## 2021-12-02 PROCEDURE — 99214 PR OFFICE/OUTPT VISIT, EST, LEVL IV, 30-39 MIN: ICD-10-PCS | Mod: S$GLB,,, | Performed by: NURSE PRACTITIONER

## 2021-12-02 PROCEDURE — 99999 PR PBB SHADOW E&M-EST. PATIENT-LVL III: ICD-10-PCS | Mod: PBBFAC,,, | Performed by: NURSE PRACTITIONER

## 2021-12-02 PROCEDURE — 99999 PR PBB SHADOW E&M-EST. PATIENT-LVL III: CPT | Mod: PBBFAC,,, | Performed by: NURSE PRACTITIONER

## 2021-12-02 PROCEDURE — 99214 OFFICE O/P EST MOD 30 MIN: CPT | Mod: S$GLB,,, | Performed by: NURSE PRACTITIONER

## 2021-12-02 RX ORDER — CRANBERRY FRUIT EXTRACT 650 MG
3 CAPSULE ORAL DAILY
COMMUNITY
Start: 2021-02-08 | End: 2022-06-27 | Stop reason: CLARIF

## 2021-12-03 ENCOUNTER — LAB VISIT (OUTPATIENT)
Dept: LAB | Facility: HOSPITAL | Age: 41
End: 2021-12-03
Attending: FAMILY MEDICINE
Payer: COMMERCIAL

## 2021-12-03 DIAGNOSIS — Z11.59 NEED FOR HEPATITIS C SCREENING TEST: ICD-10-CM

## 2021-12-03 DIAGNOSIS — Z00.00 ROUTINE PHYSICAL EXAMINATION: ICD-10-CM

## 2021-12-03 LAB
ALBUMIN SERPL BCP-MCNC: 3.7 G/DL (ref 3.5–5.2)
ALP SERPL-CCNC: 64 U/L (ref 55–135)
ALT SERPL W/O P-5'-P-CCNC: 17 U/L (ref 10–44)
ANION GAP SERPL CALC-SCNC: 9 MMOL/L (ref 8–16)
AST SERPL-CCNC: 13 U/L (ref 10–40)
BASOPHILS # BLD AUTO: 0.05 K/UL (ref 0–0.2)
BASOPHILS NFR BLD: 0.2 % (ref 0–1.9)
BILIRUB SERPL-MCNC: 0.3 MG/DL (ref 0.1–1)
BUN SERPL-MCNC: 6 MG/DL (ref 6–20)
CALCIUM SERPL-MCNC: 9.5 MG/DL (ref 8.7–10.5)
CHLORIDE SERPL-SCNC: 105 MMOL/L (ref 95–110)
CHOLEST SERPL-MCNC: 212 MG/DL (ref 120–199)
CHOLEST/HDLC SERPL: 4.5 {RATIO} (ref 2–5)
CO2 SERPL-SCNC: 26 MMOL/L (ref 23–29)
CREAT SERPL-MCNC: 0.6 MG/DL (ref 0.5–1.4)
DIFFERENTIAL METHOD: ABNORMAL
EOSINOPHIL # BLD AUTO: 0 K/UL (ref 0–0.5)
EOSINOPHIL NFR BLD: 0.1 % (ref 0–8)
ERYTHROCYTE [DISTWIDTH] IN BLOOD BY AUTOMATED COUNT: 14.2 % (ref 11.5–14.5)
EST. GFR  (AFRICAN AMERICAN): >60 ML/MIN/1.73 M^2
EST. GFR  (NON AFRICAN AMERICAN): >60 ML/MIN/1.73 M^2
GLUCOSE SERPL-MCNC: 87 MG/DL (ref 70–110)
HCT VFR BLD AUTO: 44 % (ref 37–48.5)
HDLC SERPL-MCNC: 47 MG/DL (ref 40–75)
HDLC SERPL: 22.2 % (ref 20–50)
HGB BLD-MCNC: 13.7 G/DL (ref 12–16)
IMM GRANULOCYTES # BLD AUTO: 0.12 K/UL (ref 0–0.04)
IMM GRANULOCYTES NFR BLD AUTO: 0.6 % (ref 0–0.5)
LDLC SERPL CALC-MCNC: 146.2 MG/DL (ref 63–159)
LYMPHOCYTES # BLD AUTO: 3.2 K/UL (ref 1–4.8)
LYMPHOCYTES NFR BLD: 15.8 % (ref 18–48)
MCH RBC QN AUTO: 30.3 PG (ref 27–31)
MCHC RBC AUTO-ENTMCNC: 31.1 G/DL (ref 32–36)
MCV RBC AUTO: 97 FL (ref 82–98)
MONOCYTES # BLD AUTO: 1.1 K/UL (ref 0.3–1)
MONOCYTES NFR BLD: 5.5 % (ref 4–15)
NEUTROPHILS # BLD AUTO: 15.8 K/UL (ref 1.8–7.7)
NEUTROPHILS NFR BLD: 77.8 % (ref 38–73)
NONHDLC SERPL-MCNC: 165 MG/DL
NRBC BLD-RTO: 0 /100 WBC
PLATELET # BLD AUTO: 321 K/UL (ref 150–450)
PMV BLD AUTO: 10.8 FL (ref 9.2–12.9)
POTASSIUM SERPL-SCNC: 4.1 MMOL/L (ref 3.5–5.1)
PROT SERPL-MCNC: 6.8 G/DL (ref 6–8.4)
RBC # BLD AUTO: 4.52 M/UL (ref 4–5.4)
SODIUM SERPL-SCNC: 140 MMOL/L (ref 136–145)
TRIGL SERPL-MCNC: 94 MG/DL (ref 30–150)
TSH SERPL DL<=0.005 MIU/L-ACNC: 0.52 UIU/ML (ref 0.4–4)
WBC # BLD AUTO: 20.39 K/UL (ref 3.9–12.7)

## 2021-12-03 PROCEDURE — 80061 LIPID PANEL: CPT | Performed by: NURSE PRACTITIONER

## 2021-12-03 PROCEDURE — 86803 HEPATITIS C AB TEST: CPT | Performed by: FAMILY MEDICINE

## 2021-12-03 PROCEDURE — 80053 COMPREHEN METABOLIC PANEL: CPT | Performed by: NURSE PRACTITIONER

## 2021-12-03 PROCEDURE — 85025 COMPLETE CBC W/AUTO DIFF WBC: CPT | Performed by: NURSE PRACTITIONER

## 2021-12-03 PROCEDURE — 87389 HIV-1 AG W/HIV-1&-2 AB AG IA: CPT | Performed by: NURSE PRACTITIONER

## 2021-12-03 PROCEDURE — 84443 ASSAY THYROID STIM HORMONE: CPT | Performed by: NURSE PRACTITIONER

## 2021-12-03 PROCEDURE — 36415 COLL VENOUS BLD VENIPUNCTURE: CPT | Mod: PO | Performed by: NURSE PRACTITIONER

## 2021-12-06 LAB
HCV AB SERPL QL IA: NEGATIVE
HCV AB SERPL QL IA: NEGATIVE
HIV 1+2 AB+HIV1 P24 AG SERPL QL IA: NEGATIVE

## 2021-12-08 ENCOUNTER — TELEPHONE (OUTPATIENT)
Dept: FAMILY MEDICINE | Facility: CLINIC | Age: 41
End: 2021-12-08
Payer: COMMERCIAL

## 2021-12-08 DIAGNOSIS — D72.829 LEUKOCYTOSIS, UNSPECIFIED TYPE: Primary | ICD-10-CM

## 2021-12-08 DIAGNOSIS — F17.200 TOBACCO DEPENDENCE: ICD-10-CM

## 2021-12-13 ENCOUNTER — TELEPHONE (OUTPATIENT)
Dept: FAMILY MEDICINE | Facility: CLINIC | Age: 41
End: 2021-12-13
Payer: COMMERCIAL

## 2021-12-22 ENCOUNTER — LAB VISIT (OUTPATIENT)
Dept: LAB | Facility: HOSPITAL | Age: 41
End: 2021-12-22
Attending: INTERNAL MEDICINE
Payer: COMMERCIAL

## 2021-12-22 ENCOUNTER — OFFICE VISIT (OUTPATIENT)
Dept: HEMATOLOGY/ONCOLOGY | Facility: CLINIC | Age: 41
End: 2021-12-22
Payer: COMMERCIAL

## 2021-12-22 VITALS
SYSTOLIC BLOOD PRESSURE: 134 MMHG | RESPIRATION RATE: 18 BRPM | DIASTOLIC BLOOD PRESSURE: 80 MMHG | BODY MASS INDEX: 21.72 KG/M2 | HEART RATE: 101 BPM | WEIGHT: 138.69 LBS | OXYGEN SATURATION: 100 %

## 2021-12-22 DIAGNOSIS — D72.828 OTHER ELEVATED WHITE BLOOD CELL (WBC) COUNT: ICD-10-CM

## 2021-12-22 DIAGNOSIS — D72.828 OTHER ELEVATED WHITE BLOOD CELL (WBC) COUNT: Primary | ICD-10-CM

## 2021-12-22 DIAGNOSIS — F17.210 NICOTINE DEPENDENCE, CIGARETTES, UNCOMPLICATED: ICD-10-CM

## 2021-12-22 LAB
BASOPHILS # BLD AUTO: 0.04 K/UL (ref 0–0.2)
BASOPHILS NFR BLD: 0.4 % (ref 0–1.9)
CRP SERPL-MCNC: 2.8 MG/L (ref 0–8.2)
DIFFERENTIAL METHOD: ABNORMAL
EOSINOPHIL # BLD AUTO: 0.1 K/UL (ref 0–0.5)
EOSINOPHIL NFR BLD: 1.1 % (ref 0–8)
ERYTHROCYTE [DISTWIDTH] IN BLOOD BY AUTOMATED COUNT: 13.7 % (ref 11.5–14.5)
ERYTHROCYTE [SEDIMENTATION RATE] IN BLOOD BY WESTERGREN METHOD: 31 MM/HR (ref 0–20)
HCT VFR BLD AUTO: 40.5 % (ref 37–48.5)
HGB BLD-MCNC: 13.3 G/DL (ref 12–16)
IMM GRANULOCYTES # BLD AUTO: 0.06 K/UL (ref 0–0.04)
IMM GRANULOCYTES NFR BLD AUTO: 0.6 % (ref 0–0.5)
LYMPHOCYTES # BLD AUTO: 2.8 K/UL (ref 1–4.8)
LYMPHOCYTES NFR BLD: 27.8 % (ref 18–48)
MCH RBC QN AUTO: 30.6 PG (ref 27–31)
MCHC RBC AUTO-ENTMCNC: 32.8 G/DL (ref 32–36)
MCV RBC AUTO: 93 FL (ref 82–98)
MONOCYTES # BLD AUTO: 0.7 K/UL (ref 0.3–1)
MONOCYTES NFR BLD: 6.6 % (ref 4–15)
NEUTROPHILS # BLD AUTO: 6.4 K/UL (ref 1.8–7.7)
NEUTROPHILS NFR BLD: 63.5 % (ref 38–73)
NRBC BLD-RTO: 0 /100 WBC
PLATELET # BLD AUTO: 379 K/UL (ref 150–450)
PMV BLD AUTO: 10.3 FL (ref 9.2–12.9)
RBC # BLD AUTO: 4.34 M/UL (ref 4–5.4)
WBC # BLD AUTO: 10.04 K/UL (ref 3.9–12.7)

## 2021-12-22 PROCEDURE — 3079F PR MOST RECENT DIASTOLIC BLOOD PRESSURE 80-89 MM HG: ICD-10-PCS | Mod: CPTII,S$GLB,, | Performed by: INTERNAL MEDICINE

## 2021-12-22 PROCEDURE — 81207 BCR/ABL1 GENE MINOR BP: CPT | Performed by: INTERNAL MEDICINE

## 2021-12-22 PROCEDURE — 3075F SYST BP GE 130 - 139MM HG: CPT | Mod: CPTII,S$GLB,, | Performed by: INTERNAL MEDICINE

## 2021-12-22 PROCEDURE — 81206 BCR/ABL1 GENE MAJOR BP: CPT | Performed by: INTERNAL MEDICINE

## 2021-12-22 PROCEDURE — 1160F RVW MEDS BY RX/DR IN RCRD: CPT | Mod: CPTII,S$GLB,, | Performed by: INTERNAL MEDICINE

## 2021-12-22 PROCEDURE — 85652 RBC SED RATE AUTOMATED: CPT | Performed by: INTERNAL MEDICINE

## 2021-12-22 PROCEDURE — 1159F MED LIST DOCD IN RCRD: CPT | Mod: CPTII,S$GLB,, | Performed by: INTERNAL MEDICINE

## 2021-12-22 PROCEDURE — 1160F PR REVIEW ALL MEDS BY PRESCRIBER/CLIN PHARMACIST DOCUMENTED: ICD-10-PCS | Mod: CPTII,S$GLB,, | Performed by: INTERNAL MEDICINE

## 2021-12-22 PROCEDURE — 99214 PR OFFICE/OUTPT VISIT, EST, LEVL IV, 30-39 MIN: ICD-10-PCS | Mod: S$GLB,,, | Performed by: INTERNAL MEDICINE

## 2021-12-22 PROCEDURE — 86140 C-REACTIVE PROTEIN: CPT | Performed by: INTERNAL MEDICINE

## 2021-12-22 PROCEDURE — 3008F BODY MASS INDEX DOCD: CPT | Mod: CPTII,S$GLB,, | Performed by: INTERNAL MEDICINE

## 2021-12-22 PROCEDURE — 3008F PR BODY MASS INDEX (BMI) DOCUMENTED: ICD-10-PCS | Mod: CPTII,S$GLB,, | Performed by: INTERNAL MEDICINE

## 2021-12-22 PROCEDURE — 99999 PR PBB SHADOW E&M-EST. PATIENT-LVL III: CPT | Mod: PBBFAC,,, | Performed by: INTERNAL MEDICINE

## 2021-12-22 PROCEDURE — 85025 COMPLETE CBC W/AUTO DIFF WBC: CPT | Performed by: INTERNAL MEDICINE

## 2021-12-22 PROCEDURE — 36415 COLL VENOUS BLD VENIPUNCTURE: CPT | Performed by: INTERNAL MEDICINE

## 2021-12-22 PROCEDURE — 3075F PR MOST RECENT SYSTOLIC BLOOD PRESS GE 130-139MM HG: ICD-10-PCS | Mod: CPTII,S$GLB,, | Performed by: INTERNAL MEDICINE

## 2021-12-22 PROCEDURE — 3079F DIAST BP 80-89 MM HG: CPT | Mod: CPTII,S$GLB,, | Performed by: INTERNAL MEDICINE

## 2021-12-22 PROCEDURE — 1159F PR MEDICATION LIST DOCUMENTED IN MEDICAL RECORD: ICD-10-PCS | Mod: CPTII,S$GLB,, | Performed by: INTERNAL MEDICINE

## 2021-12-22 PROCEDURE — 99999 PR PBB SHADOW E&M-EST. PATIENT-LVL III: ICD-10-PCS | Mod: PBBFAC,,, | Performed by: INTERNAL MEDICINE

## 2021-12-22 PROCEDURE — 99214 OFFICE O/P EST MOD 30 MIN: CPT | Mod: S$GLB,,, | Performed by: INTERNAL MEDICINE

## 2021-12-28 LAB
DIAGNOSTIC BCR/ABL 1 RESULT: NORMAL
NARRATIVE DIAGNOSTIC REPORT-IMP: NORMAL
SPECIMEN TYPE, BCR/ABL: NORMAL

## 2022-01-03 ENCOUNTER — TELEPHONE (OUTPATIENT)
Dept: HEMATOLOGY/ONCOLOGY | Facility: CLINIC | Age: 42
End: 2022-01-03
Payer: COMMERCIAL

## 2022-01-03 NOTE — TELEPHONE ENCOUNTER
I called and reviewed her labs. BCR/ABL was negative. Her intermittent leukocytosis is secondary to inflammation and cigarettes.     No further workup at this time. Return to clinic as needed.    Adrien Mendoza M.D.  Hematology/Oncology  [facility] - [address], [address]  [place] [address]  Phone: [phone]  Fax: [phone]

## 2022-01-24 ENCOUNTER — OFFICE VISIT (OUTPATIENT)
Dept: SURGERY | Facility: CLINIC | Age: 42
End: 2022-01-24
Payer: COMMERCIAL

## 2022-01-24 VITALS
HEIGHT: 67 IN | BODY MASS INDEX: 21.66 KG/M2 | DIASTOLIC BLOOD PRESSURE: 72 MMHG | SYSTOLIC BLOOD PRESSURE: 131 MMHG | WEIGHT: 138 LBS | HEART RATE: 94 BPM

## 2022-01-24 DIAGNOSIS — Z90.13 STATUS POST MASTECTOMY, BILATERAL: Primary | ICD-10-CM

## 2022-01-24 DIAGNOSIS — Z85.3 PERSONAL HISTORY OF BREAST CANCER: ICD-10-CM

## 2022-01-24 PROCEDURE — 99999 PR PBB SHADOW E&M-EST. PATIENT-LVL III: CPT | Mod: PBBFAC,,, | Performed by: PHYSICIAN ASSISTANT

## 2022-01-24 PROCEDURE — 99213 PR OFFICE/OUTPT VISIT, EST, LEVL III, 20-29 MIN: ICD-10-PCS | Mod: S$GLB,,, | Performed by: PHYSICIAN ASSISTANT

## 2022-01-24 PROCEDURE — 99213 OFFICE O/P EST LOW 20 MIN: CPT | Mod: S$GLB,,, | Performed by: PHYSICIAN ASSISTANT

## 2022-01-24 PROCEDURE — 3008F PR BODY MASS INDEX (BMI) DOCUMENTED: ICD-10-PCS | Mod: CPTII,S$GLB,, | Performed by: PHYSICIAN ASSISTANT

## 2022-01-24 PROCEDURE — 3078F DIAST BP <80 MM HG: CPT | Mod: CPTII,S$GLB,, | Performed by: PHYSICIAN ASSISTANT

## 2022-01-24 PROCEDURE — 3008F BODY MASS INDEX DOCD: CPT | Mod: CPTII,S$GLB,, | Performed by: PHYSICIAN ASSISTANT

## 2022-01-24 PROCEDURE — 99999 PR PBB SHADOW E&M-EST. PATIENT-LVL III: ICD-10-PCS | Mod: PBBFAC,,, | Performed by: PHYSICIAN ASSISTANT

## 2022-01-24 PROCEDURE — 1160F PR REVIEW ALL MEDS BY PRESCRIBER/CLIN PHARMACIST DOCUMENTED: ICD-10-PCS | Mod: CPTII,S$GLB,, | Performed by: PHYSICIAN ASSISTANT

## 2022-01-24 PROCEDURE — 1160F RVW MEDS BY RX/DR IN RCRD: CPT | Mod: CPTII,S$GLB,, | Performed by: PHYSICIAN ASSISTANT

## 2022-01-24 PROCEDURE — 1159F MED LIST DOCD IN RCRD: CPT | Mod: CPTII,S$GLB,, | Performed by: PHYSICIAN ASSISTANT

## 2022-01-24 PROCEDURE — 3075F SYST BP GE 130 - 139MM HG: CPT | Mod: CPTII,S$GLB,, | Performed by: PHYSICIAN ASSISTANT

## 2022-01-24 PROCEDURE — 3075F PR MOST RECENT SYSTOLIC BLOOD PRESS GE 130-139MM HG: ICD-10-PCS | Mod: CPTII,S$GLB,, | Performed by: PHYSICIAN ASSISTANT

## 2022-01-24 PROCEDURE — 1159F PR MEDICATION LIST DOCUMENTED IN MEDICAL RECORD: ICD-10-PCS | Mod: CPTII,S$GLB,, | Performed by: PHYSICIAN ASSISTANT

## 2022-01-24 PROCEDURE — 3078F PR MOST RECENT DIASTOLIC BLOOD PRESSURE < 80 MM HG: ICD-10-PCS | Mod: CPTII,S$GLB,, | Performed by: PHYSICIAN ASSISTANT

## 2022-01-24 NOTE — PROGRESS NOTES
"[facility]  Department of Surgery    REFERRING PROVIDER: No referring provider defined for this encounter.    Chief Complaint: Follow-up (6 mth Follow up.)    DIAGNOSIS:   This is a 41 y.o. 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    TREATMENT:   1. Bilateral  mastectomy with sentinel node biopsy on 10/12/2020. Anahi vu M.D. Surgical Oncology. Implant reconstruction with Dr. Porter.     HISTORY OF PRESENT ILLNESS:   Alton Bach is a 41 y.o. female comes in for oncological follow up.  She denies change in her breast self-exam specifically denying new masses, skin changes, or breast pain. Past medical and surgical history is updated with new changes. There have been no changes to family history. The patient denies constitutional symptoms of night sweats, weight loss, new headaches, visual changes, new back or bony pain, chest pain, or shortness of breath.      IMAGING:   None needed    PHYSICAL EXAM:   /72 (BP Location: Left arm, Patient Position: Sitting, BP Method: Medium (Automatic))   Pulse 94   Ht 5' 7" (1.702 m)   Wt 62.6 kg (138 lb)   LMP 01/17/2022   BMI 21.61 kg/m²   Physical Exam   Vitals reviewed.  Constitutional: She is oriented to person, place, and time. She appears well-developed and well-nourished. No distress.   HENT:   Head: Normocephalic and atraumatic.   Eyes: Pupils are equal, round, and reactive to light.   Pulmonary/Chest: Effort normal and breath sounds normal. No respiratory distress. She exhibits no mass, no tenderness and no edema. Right breast exhibits no mass, no skin change and no tenderness. Left breast exhibits no mass, no skin change and no tenderness.       Abdominal: Soft.   Neurological: She is alert and oriented to person, place, and time.   Skin: Skin is warm and dry. She is not diaphoretic. No erythema.     Psychiatric: She has a normal mood and affect. Her behavior is normal.        ASSESSMENT:   This is " a 41 y.o. female without evidence of recurrence by exam, history or imaging.       PLAN:   1. Continue monthly self breast exams and call the clinic with any changes or problems.  2. Patient is interested in nipple tattoos now that her final implant exchange is complete. I will reach out to OMAR Lee about scheduling.  3. The patient is in agreement with the plan. Questions were encouraged and answered to patient's satisfaction. Alton will call our office with any questions or concerns.     NEETA NunnC  Breast Surgery

## 2022-02-16 ENCOUNTER — OFFICE VISIT (OUTPATIENT)
Dept: PLASTIC SURGERY | Facility: CLINIC | Age: 42
End: 2022-02-16
Payer: COMMERCIAL

## 2022-02-16 VITALS
BODY MASS INDEX: 21.66 KG/M2 | DIASTOLIC BLOOD PRESSURE: 60 MMHG | HEIGHT: 67 IN | WEIGHT: 138 LBS | SYSTOLIC BLOOD PRESSURE: 129 MMHG | HEART RATE: 91 BPM

## 2022-02-16 DIAGNOSIS — Z85.3 PERSONAL HISTORY OF BREAST CANCER: Primary | ICD-10-CM

## 2022-02-16 PROCEDURE — 1160F PR REVIEW ALL MEDS BY PRESCRIBER/CLIN PHARMACIST DOCUMENTED: ICD-10-PCS | Mod: CPTII,S$GLB,, | Performed by: PHYSICIAN ASSISTANT

## 2022-02-16 PROCEDURE — 1159F PR MEDICATION LIST DOCUMENTED IN MEDICAL RECORD: ICD-10-PCS | Mod: CPTII,S$GLB,, | Performed by: PHYSICIAN ASSISTANT

## 2022-02-16 PROCEDURE — 3078F PR MOST RECENT DIASTOLIC BLOOD PRESSURE < 80 MM HG: ICD-10-PCS | Mod: CPTII,S$GLB,, | Performed by: PHYSICIAN ASSISTANT

## 2022-02-16 PROCEDURE — 3008F PR BODY MASS INDEX (BMI) DOCUMENTED: ICD-10-PCS | Mod: CPTII,S$GLB,, | Performed by: PHYSICIAN ASSISTANT

## 2022-02-16 PROCEDURE — 3078F DIAST BP <80 MM HG: CPT | Mod: CPTII,S$GLB,, | Performed by: PHYSICIAN ASSISTANT

## 2022-02-16 PROCEDURE — 99999 PR PBB SHADOW E&M-EST. PATIENT-LVL III: CPT | Mod: PBBFAC,,, | Performed by: PHYSICIAN ASSISTANT

## 2022-02-16 PROCEDURE — 99999 PR PBB SHADOW E&M-EST. PATIENT-LVL III: ICD-10-PCS | Mod: PBBFAC,,, | Performed by: PHYSICIAN ASSISTANT

## 2022-02-16 PROCEDURE — 1159F MED LIST DOCD IN RCRD: CPT | Mod: CPTII,S$GLB,, | Performed by: PHYSICIAN ASSISTANT

## 2022-02-16 PROCEDURE — 1160F RVW MEDS BY RX/DR IN RCRD: CPT | Mod: CPTII,S$GLB,, | Performed by: PHYSICIAN ASSISTANT

## 2022-02-16 PROCEDURE — 3074F PR MOST RECENT SYSTOLIC BLOOD PRESSURE < 130 MM HG: ICD-10-PCS | Mod: CPTII,S$GLB,, | Performed by: PHYSICIAN ASSISTANT

## 2022-02-16 PROCEDURE — 99213 OFFICE O/P EST LOW 20 MIN: CPT | Mod: S$GLB,,, | Performed by: PHYSICIAN ASSISTANT

## 2022-02-16 PROCEDURE — 3074F SYST BP LT 130 MM HG: CPT | Mod: CPTII,S$GLB,, | Performed by: PHYSICIAN ASSISTANT

## 2022-02-16 PROCEDURE — 99213 PR OFFICE/OUTPT VISIT, EST, LEVL III, 20-29 MIN: ICD-10-PCS | Mod: S$GLB,,, | Performed by: PHYSICIAN ASSISTANT

## 2022-02-16 PROCEDURE — 3008F BODY MASS INDEX DOCD: CPT | Mod: CPTII,S$GLB,, | Performed by: PHYSICIAN ASSISTANT

## 2022-02-16 NOTE — PROGRESS NOTES
Subjective:      Alton Bach is a 41 y.o. year old female who presents to the Plastic Surgery Clinic on 02/16/2022 for follow up visit status post B breast reconstruction with breast implants.     Denies fever, chills, nausea, vomiting, or other systemic signs of infection.    Patient was seen and evaluated by myself and Dr. Haresh Porter      Vitals:    02/16/22 1344   BP: 129/60   Pulse: 91        Review of patient's allergies indicates:   Allergen Reactions    Ciprofloxacin Hives, Shortness Of Breath and Itching    Cleocin [clindamycin hcl] Hives and Swelling    Coconut Swelling and Rash    Dye Swelling and Blisters     Hair Dye    Strawberry Swelling and Rash       Current Outpatient Medications on File Prior to Visit   Medication Sig Dispense Refill    ibuprofen (ADVIL,MOTRIN) 600 MG tablet Take 1 tablet (600 mg total) by mouth 3 (three) times daily. 30 tablet 0    multivitamin (ONE DAILY MULTIVITAMIN) per tablet Take 1 tablet by mouth once daily.       mv-mn-iron-folic acid-K-grntea (PROCERV HP) 9 mg iron- 300 mcg-50 mcg Tab Take 3 capsules by mouth once daily.      VITAMIN B COMPLEX ORAL Take 1 tablet by mouth.       Current Facility-Administered Medications on File Prior to Visit   Medication Dose Route Frequency Provider Last Rate Last Admin    heparin (porcine) injection 5,000 Units  5,000 Units Subcutaneous Once Karen Garcia MD        sodium chloride 0.9% flush 10 mL  10 mL Intravenous PRN Karen Garcia MD           Patient Active Problem List   Diagnosis    Groin strain    Ductal carcinoma in situ (DCIS) of left breast    Breast cancer       Past Surgical History:   Procedure Laterality Date    breast augmentation      BREAST BIOPSY Right     CHOLECYSTECTOMY      MASTECTOMY Bilateral 10/12/2020    Procedure: MASTECTOMY-Bilateral ;  Surgeon: Anahi Huitron MD;  Location: [facility] OR [address] [place] [address];  Service: General;  Laterality: Bilateral;    REPLACEMENT OF IMPLANT OF  BREAST Bilateral 10/12/2020    Procedure: REPLACEMENT, IMPLANT, BREAST-Bilateral;  Surgeon: Haresh Porter MD;  Location: [address];  Service: Plastics;  Laterality: Bilateral;    REPLACEMENT OF IMPLANT OF BREAST Left 11/4/2020    Procedure: Left Breast Implant Exchange;  Surgeon: Haresh Porter MD;  Location: [address];  Service: Plastics;  Laterality: Left;    SENTINEL LYMPH NODE BIOPSY Bilateral 10/12/2020    Procedure: BIOPSY, LYMPH NODE, SENTINEL-Bilateral;  Surgeon: Anahi Huitron MD;  Location: [address];  Service: General;  Laterality: Bilateral;       Social History     Socioeconomic History    Marital status: Single   Tobacco Use    Smoking status: Current Every Day Smoker     Packs/day: 0.50    Smokeless tobacco: Never Used   Substance and Sexual Activity    Alcohol use: No    Drug use: No     Date of surgery on 12/29   Preoperative diagnosis breast cancer  Postoperative diagnosis is same  Procedure performed: immediate bilateral breast reconstruction using implants  Placement of AlloDerm a left breast  Placement of AlloDerm right breast  Surgeon Babycos  Anesthesia general  Complications none  Drains times for  Blood loss minimal    Date of surgery 11/04/2020  Preoperative diagnosis breast cancer  2.partial mastectomy flap necrosis secondary to patient noncompliance  Procedure  1)Irrigation and debridement of mastectomy flap skin skin and subcutaneous tissue only measuring approximately 2 cm x 6 cm  2)left implant exchange.    3 Extensive superior capsulotomy left breast  4) removal alloderm  Surgeon Babycos  Anesthesia general  Complications none  Drains x1  Blood loss minimal.       Review of Systems: negative    Objective:     Physical Exam:  Vitals:    02/16/22 1344   BP: 129/60   Pulse: 91       WD WN NAD  VSS  Normal resp effort  L breast - incision healed, no erythema, surgically absent nipple, no open wound  R breast - incision healed, no erythema or  drainage, surgically absent nipple, no open wound        Assessment:       1. Surgery follow-up examination        Plan:   41 y.o. female status post B breast reconstruction   - Patient doing well, pleased with outcome  - Will need free fat transfer to B superior breast followed by 3D NAC tattoo once healed  -  Office staff to call and coordinate surgery date at patients leisure        All questions were answered. The patient was advised to call the clinic with any questions or concerns prior to their next visit.           Luz Elena Caldwell PA-C  Plastic and Reconstruction Surgery Department  [phone] office

## 2022-02-28 ENCOUNTER — TELEPHONE (OUTPATIENT)
Dept: PLASTIC SURGERY | Facility: CLINIC | Age: 42
End: 2022-02-28
Payer: COMMERCIAL

## 2022-02-28 ENCOUNTER — PATIENT MESSAGE (OUTPATIENT)
Dept: PLASTIC SURGERY | Facility: CLINIC | Age: 42
End: 2022-02-28
Payer: COMMERCIAL

## 2022-02-28 NOTE — TELEPHONE ENCOUNTER
Attempted to call pt to schedule surg. Pt not available. Unable to leave vm. Message sent on portal.

## 2022-03-03 DIAGNOSIS — Z85.3 HX OF BREAST CANCER: Primary | ICD-10-CM

## 2022-04-11 ENCOUNTER — ANESTHESIA EVENT (OUTPATIENT)
Dept: SURGERY | Facility: HOSPITAL | Age: 42
End: 2022-04-11
Payer: COMMERCIAL

## 2022-05-04 ENCOUNTER — PATIENT MESSAGE (OUTPATIENT)
Dept: PLASTIC SURGERY | Facility: CLINIC | Age: 42
End: 2022-05-04
Payer: COMMERCIAL

## 2022-05-19 ENCOUNTER — ANESTHESIA (OUTPATIENT)
Dept: SURGERY | Facility: HOSPITAL | Age: 42
End: 2022-05-19
Payer: COMMERCIAL

## 2022-05-23 ENCOUNTER — OFFICE VISIT (OUTPATIENT)
Dept: URGENT CARE | Facility: CLINIC | Age: 42
End: 2022-05-23
Payer: COMMERCIAL

## 2022-05-23 VITALS
HEART RATE: 98 BPM | RESPIRATION RATE: 18 BRPM | HEIGHT: 67 IN | OXYGEN SATURATION: 98 % | TEMPERATURE: 98 F | BODY MASS INDEX: 21.97 KG/M2 | SYSTOLIC BLOOD PRESSURE: 149 MMHG | DIASTOLIC BLOOD PRESSURE: 87 MMHG | WEIGHT: 140 LBS

## 2022-05-23 DIAGNOSIS — J32.9 SINUSITIS, UNSPECIFIED CHRONICITY, UNSPECIFIED LOCATION: ICD-10-CM

## 2022-05-23 DIAGNOSIS — J30.9 ALLERGIC RHINITIS, UNSPECIFIED SEASONALITY, UNSPECIFIED TRIGGER: Primary | ICD-10-CM

## 2022-05-23 DIAGNOSIS — H69.93 DYSFUNCTION OF BOTH EUSTACHIAN TUBES: ICD-10-CM

## 2022-05-23 PROCEDURE — 3079F DIAST BP 80-89 MM HG: CPT | Mod: CPTII,S$GLB,, | Performed by: PHYSICIAN ASSISTANT

## 2022-05-23 PROCEDURE — 3079F PR MOST RECENT DIASTOLIC BLOOD PRESSURE 80-89 MM HG: ICD-10-PCS | Mod: CPTII,S$GLB,, | Performed by: PHYSICIAN ASSISTANT

## 2022-05-23 PROCEDURE — 99214 OFFICE O/P EST MOD 30 MIN: CPT | Mod: S$GLB,,, | Performed by: PHYSICIAN ASSISTANT

## 2022-05-23 PROCEDURE — 1159F MED LIST DOCD IN RCRD: CPT | Mod: CPTII,S$GLB,, | Performed by: PHYSICIAN ASSISTANT

## 2022-05-23 PROCEDURE — 99214 PR OFFICE/OUTPT VISIT, EST, LEVL IV, 30-39 MIN: ICD-10-PCS | Mod: S$GLB,,, | Performed by: PHYSICIAN ASSISTANT

## 2022-05-23 PROCEDURE — 3077F SYST BP >= 140 MM HG: CPT | Mod: CPTII,S$GLB,, | Performed by: PHYSICIAN ASSISTANT

## 2022-05-23 PROCEDURE — 1160F RVW MEDS BY RX/DR IN RCRD: CPT | Mod: CPTII,S$GLB,, | Performed by: PHYSICIAN ASSISTANT

## 2022-05-23 PROCEDURE — 3077F PR MOST RECENT SYSTOLIC BLOOD PRESSURE >= 140 MM HG: ICD-10-PCS | Mod: CPTII,S$GLB,, | Performed by: PHYSICIAN ASSISTANT

## 2022-05-23 PROCEDURE — 3008F BODY MASS INDEX DOCD: CPT | Mod: CPTII,S$GLB,, | Performed by: PHYSICIAN ASSISTANT

## 2022-05-23 PROCEDURE — 1160F PR REVIEW ALL MEDS BY PRESCRIBER/CLIN PHARMACIST DOCUMENTED: ICD-10-PCS | Mod: CPTII,S$GLB,, | Performed by: PHYSICIAN ASSISTANT

## 2022-05-23 PROCEDURE — 3008F PR BODY MASS INDEX (BMI) DOCUMENTED: ICD-10-PCS | Mod: CPTII,S$GLB,, | Performed by: PHYSICIAN ASSISTANT

## 2022-05-23 PROCEDURE — 1159F PR MEDICATION LIST DOCUMENTED IN MEDICAL RECORD: ICD-10-PCS | Mod: CPTII,S$GLB,, | Performed by: PHYSICIAN ASSISTANT

## 2022-05-23 RX ORDER — FLUTICASONE PROPIONATE 50 MCG
1 SPRAY, SUSPENSION (ML) NASAL DAILY
Qty: 16 G | Refills: 3 | Status: SHIPPED | OUTPATIENT
Start: 2022-05-23 | End: 2022-06-21

## 2022-05-23 RX ORDER — CROMOLYN SODIUM 5.2 MG/ML
1 SPRAY, METERED NASAL 4 TIMES DAILY
Qty: 26 ML | Refills: 1 | Status: SHIPPED | OUTPATIENT
Start: 2022-05-23 | End: 2022-06-21

## 2022-05-23 RX ORDER — PREDNISONE 20 MG/1
20 TABLET ORAL DAILY
Qty: 4 TABLET | Refills: 0 | Status: SHIPPED | OUTPATIENT
Start: 2022-05-23 | End: 2022-06-21

## 2022-05-23 RX ORDER — CETIRIZINE HYDROCHLORIDE 10 MG/1
10 TABLET ORAL DAILY
Qty: 30 TABLET | Refills: 1 | Status: SHIPPED | OUTPATIENT
Start: 2022-05-23 | End: 2022-06-21

## 2022-05-23 RX ORDER — BETAMETHASONE SODIUM PHOSPHATE AND BETAMETHASONE ACETATE 3; 3 MG/ML; MG/ML
6 INJECTION, SUSPENSION INTRA-ARTICULAR; INTRALESIONAL; INTRAMUSCULAR; SOFT TISSUE
Status: DISCONTINUED | OUTPATIENT
Start: 2022-05-23 | End: 2022-05-23

## 2022-05-23 NOTE — LETTER
May 23, 2022      [facility]  [address], [address] [address]  Phone: [phone]  Fax: [phone]       Patient: Alton Bach   YOB: 1980  Date of Visit: 05/23/2022    To Whom It May Concern:    Hannah Bach  was at [facility] on 05/23/2022. The patient may return to work/school on 05/24/2022 with no restrictions. If you have any questions or concerns, or if I can be of further assistance, please do not hesitate to contact me.    Sincerely,    Evelio Salgado PA

## 2022-05-23 NOTE — PROGRESS NOTES
"Subjective:       Patient ID: Alton Bach is a 41 y.o. female.    Vitals:  height is 5' 7" (1.702 m) and weight is 63.5 kg (140 lb). Her temperature is 97.8 °F (36.6 °C). Her blood pressure is 149/87 (abnormal) and her pulse is 98. Her respiration is 18 and oxygen saturation is 98%.     Chief Complaint: Ear Fullness    Pt is coming in today with ear fullness and nasal congestion, symptoms started 3 days ago, unchanged, pt states its both ears, constant, no pain, no medication taken, not covid vaccinated, pt did a a at home covid test 3 days ago came back negative, pt declined any testing today.  Sinus congestion ear pain for the last week getting worse.    Ear Fullness   There is pain in both ears. This is a new problem. Episode onset: 3 days  The problem occurs constantly. The problem has been unchanged. There has been no fever. The pain is at a severity of 0/10. The patient is experiencing no pain. Pertinent negatives include no ear discharge. She has tried nothing for the symptoms. The treatment provided no relief.       HENT: Positive for congestion. Negative for ear discharge and foreign body in ear.        Objective:      Physical Exam   Constitutional: She is oriented to person, place, and time. She appears well-developed. She is cooperative.  Non-toxic appearance. She does not appear ill. No distress.   HENT:   Head: Normocephalic and atraumatic.   Ears:   Right Ear: Hearing, external ear and ear canal normal.   Left Ear: Hearing, external ear and ear canal normal.      Comments: Bilateral tympanic membranes are dull slightly retracted  Nose: Rhinorrhea and congestion present. No mucosal edema or nasal deformity. No epistaxis. Right sinus exhibits no maxillary sinus tenderness and no frontal sinus tenderness. Left sinus exhibits no maxillary sinus tenderness and no frontal sinus tenderness.   Mouth/Throat: Uvula is midline, oropharynx is clear and moist and mucous membranes are normal. No trismus " in the jaw. Normal dentition. No uvula swelling. No oropharyngeal exudate, posterior oropharyngeal edema or posterior oropharyngeal erythema.   Eyes: Conjunctivae and lids are normal. Pupils are equal, round, and reactive to light. Right eye exhibits no discharge. Left eye exhibits no discharge. No scleral icterus. Extraocular movement intact   Neck: Trachea normal and phonation normal. Neck supple. No edema present. No erythema present. No neck rigidity present.   Cardiovascular: Normal rate, regular rhythm, normal heart sounds and normal pulses.   Pulmonary/Chest: Effort normal and breath sounds normal. No respiratory distress. She has no decreased breath sounds. She has no wheezes. She has no rhonchi. She has no rales.   Abdominal: Normal appearance. Soft.   Musculoskeletal: Normal range of motion.         General: No deformity. Normal range of motion.   Neurological: She is alert and oriented to person, place, and time. She exhibits normal muscle tone. Coordination normal.   Skin: Skin is warm, dry, intact, not diaphoretic and not pale.   Psychiatric: Her speech is normal and behavior is normal. Judgment and thought content normal.   Nursing note and vitals reviewed.        Assessment:       1. Allergic rhinitis, unspecified seasonality, unspecified trigger    2. Dysfunction of both eustachian tubes    3. Sinusitis, unspecified chronicity, unspecified location          Plan:         Allergic rhinitis, unspecified seasonality, unspecified trigger  -     fluticasone propionate (FLONASE) 50 mcg/actuation nasal spray; 1 spray (50 mcg total) by Each Nostril route once daily.  Dispense: 16 g; Refill: 3  -     cromolyn (NASALCHROM) 5.2 mg/spray (4 %) nasal spray; 1 spray by Nasal route 4 (four) times daily.  Dispense: 26 mL; Refill: 1  -     predniSONE (DELTASONE) 20 MG tablet; Take 1 tablet (20 mg total) by mouth once daily.  Dispense: 4 tablet; Refill: 0  -     cetirizine (ZYRTEC) 10 MG tablet; Take 1 tablet (10 mg  total) by mouth once daily.  Dispense: 30 tablet; Refill: 1  -     betamethasone acetate-betamethasone sodium phosphate injection 6 mg    Dysfunction of both eustachian tubes  -     fluticasone propionate (FLONASE) 50 mcg/actuation nasal spray; 1 spray (50 mcg total) by Each Nostril route once daily.  Dispense: 16 g; Refill: 3  -     cromolyn (NASALCHROM) 5.2 mg/spray (4 %) nasal spray; 1 spray by Nasal route 4 (four) times daily.  Dispense: 26 mL; Refill: 1  -     predniSONE (DELTASONE) 20 MG tablet; Take 1 tablet (20 mg total) by mouth once daily.  Dispense: 4 tablet; Refill: 0  -     cetirizine (ZYRTEC) 10 MG tablet; Take 1 tablet (10 mg total) by mouth once daily.  Dispense: 30 tablet; Refill: 1    Sinusitis, unspecified chronicity, unspecified location    Follow up if symptoms worsen or fail to improve, for F/U with PCP or ED. There are no Patient Instructions on file for this visit.

## 2022-06-21 ENCOUNTER — OFFICE VISIT (OUTPATIENT)
Dept: FAMILY MEDICINE | Facility: CLINIC | Age: 42
End: 2022-06-21
Payer: COMMERCIAL

## 2022-06-21 VITALS
TEMPERATURE: 98 F | HEIGHT: 67 IN | BODY MASS INDEX: 22.84 KG/M2 | WEIGHT: 145.5 LBS | HEART RATE: 85 BPM | SYSTOLIC BLOOD PRESSURE: 124 MMHG | DIASTOLIC BLOOD PRESSURE: 80 MMHG | OXYGEN SATURATION: 99 %

## 2022-06-21 DIAGNOSIS — Z01.818 PRE-OPERATIVE GENERAL PHYSICAL EXAMINATION: Primary | ICD-10-CM

## 2022-06-21 PROCEDURE — 3074F PR MOST RECENT SYSTOLIC BLOOD PRESSURE < 130 MM HG: ICD-10-PCS | Mod: CPTII,S$GLB,, | Performed by: NURSE PRACTITIONER

## 2022-06-21 PROCEDURE — 3008F PR BODY MASS INDEX (BMI) DOCUMENTED: ICD-10-PCS | Mod: CPTII,S$GLB,, | Performed by: NURSE PRACTITIONER

## 2022-06-21 PROCEDURE — 3008F BODY MASS INDEX DOCD: CPT | Mod: CPTII,S$GLB,, | Performed by: NURSE PRACTITIONER

## 2022-06-21 PROCEDURE — 3074F SYST BP LT 130 MM HG: CPT | Mod: CPTII,S$GLB,, | Performed by: NURSE PRACTITIONER

## 2022-06-21 PROCEDURE — 93005 ELECTROCARDIOGRAM TRACING: CPT | Mod: S$GLB,,, | Performed by: NURSE PRACTITIONER

## 2022-06-21 PROCEDURE — 93010 EKG 12-LEAD: ICD-10-PCS | Mod: S$GLB,,, | Performed by: INTERNAL MEDICINE

## 2022-06-21 PROCEDURE — 93005 EKG 12-LEAD: ICD-10-PCS | Mod: S$GLB,,, | Performed by: NURSE PRACTITIONER

## 2022-06-21 PROCEDURE — 99999 PR PBB SHADOW E&M-EST. PATIENT-LVL IV: CPT | Mod: PBBFAC,,, | Performed by: NURSE PRACTITIONER

## 2022-06-21 PROCEDURE — 3079F PR MOST RECENT DIASTOLIC BLOOD PRESSURE 80-89 MM HG: ICD-10-PCS | Mod: CPTII,S$GLB,, | Performed by: NURSE PRACTITIONER

## 2022-06-21 PROCEDURE — 3079F DIAST BP 80-89 MM HG: CPT | Mod: CPTII,S$GLB,, | Performed by: NURSE PRACTITIONER

## 2022-06-21 PROCEDURE — 1159F PR MEDICATION LIST DOCUMENTED IN MEDICAL RECORD: ICD-10-PCS | Mod: CPTII,S$GLB,, | Performed by: NURSE PRACTITIONER

## 2022-06-21 PROCEDURE — 1159F MED LIST DOCD IN RCRD: CPT | Mod: CPTII,S$GLB,, | Performed by: NURSE PRACTITIONER

## 2022-06-21 PROCEDURE — 99214 PR OFFICE/OUTPT VISIT, EST, LEVL IV, 30-39 MIN: ICD-10-PCS | Mod: S$GLB,,, | Performed by: NURSE PRACTITIONER

## 2022-06-21 PROCEDURE — 99214 OFFICE O/P EST MOD 30 MIN: CPT | Mod: S$GLB,,, | Performed by: NURSE PRACTITIONER

## 2022-06-21 PROCEDURE — 93010 ELECTROCARDIOGRAM REPORT: CPT | Mod: S$GLB,,, | Performed by: INTERNAL MEDICINE

## 2022-06-21 PROCEDURE — 99999 PR PBB SHADOW E&M-EST. PATIENT-LVL IV: ICD-10-PCS | Mod: PBBFAC,,, | Performed by: NURSE PRACTITIONER

## 2022-06-21 NOTE — Clinical Note
Patient optimized for elective surgery scheduled 07/05/2022. All labs and imaging within acceptable limits. Occult blood seen on U/A is due to current monthly cycle.  Ashish Brian NP

## 2022-06-21 NOTE — PROGRESS NOTES
Subjective:      Alton Bach is a 41 y.o. female who presents to the office today for a preoperative consultation at the request of surgeon Dr. Hraesh Porter who plans on performing LIPOSUCTION, WITH FAT TRANSFER. BILATERAL on July 5, 2022 This consultation is requested for the specific conditions prompting preoperative evaluation (i.e. because of potential affect on operative risk): None. Planned anesthesia: general. The patient has the following known anesthesia issues: None. Patients bleeding risk: None. Patient does not have objections to receiving blood products if needed.    The following portions of the patient's history were reviewed and updated as appropriate:   She  has a past medical history of DCIS (ductal carcinoma in situ).  She does not have any pertinent problems on file.  She  has a past surgical history that includes Cholecystectomy; breast augmentation; Breast biopsy (Right); Mastectomy (Bilateral, 10/12/2020); [place] lymph node biopsy (Bilateral, 10/12/2020); Replacement of implant of breast (Bilateral, 10/12/2020); and Replacement of implant of breast (Left, 11/4/2020).  Her family history includes Heart disease in her father; Hypertension in her mother.  She  reports that she has been smoking. She has been smoking about 0.50 packs per day. She has never used smokeless tobacco. She reports that she does not drink alcohol and does not use drugs.  She has a current medication list which includes the following prescription(s): ibuprofen, one daily multivitamin, vitamin b complex, and procerv hp, and the following Facility-Administered Medications: heparin (porcine) and sodium chloride 0.9%.  Current Outpatient Medications on File Prior to Visit   Medication Sig Dispense Refill    ibuprofen (ADVIL,MOTRIN) 600 MG tablet Take 1 tablet (600 mg total) by mouth 3 (three) times daily. 30 tablet 0    multivitamin (ONE DAILY MULTIVITAMIN) per tablet Take 1 tablet by mouth once  "daily.       VITAMIN B COMPLEX ORAL Take 1 tablet by mouth.      mv-mn-iron-folic acid-K-grntea (PROCERV HP) 9 mg iron- 300 mcg-50 mcg Tab Take 3 capsules by mouth once daily.       Current Facility-Administered Medications on File Prior to Visit   Medication Dose Route Frequency Provider Last Rate Last Admin    heparin (porcine) injection 5,000 Units  5,000 Units Subcutaneous Once Karen Garcia MD        sodium chloride 0.9% flush 10 mL  10 mL Intravenous PRN Karen Garcia MD         She is allergic to ciprofloxacin, cleocin [clindamycin hcl], coconut, dye, and strawberry..    Review of Systems  Constitutional: negative  Eyes: negative  Ears, nose, mouth, throat, and face: negative  Respiratory: negative  Cardiovascular: negative  Gastrointestinal: negative  Genitourinary:negative  Integument/breast: negative  Hematologic/lymphatic: negative  Musculoskeletal:negative  Neurological: negative  Behavioral/Psych: negative  Endocrine: negative  Allergic/Immunologic: negative  Ciprofloxacin, Clindamycin, Coconut, Dye and Strawberries      Objective:        /80   Pulse 85   Temp 98.2 °F (36.8 °C) (Oral)   Ht 5' 7" (1.702 m)   Wt 66 kg (145 lb 8.1 oz)   LMP 06/16/2022 (Approximate)   SpO2 99%   BMI 22.79 kg/m²     General Appearance:    Alert, cooperative, no distress, appears stated age   Head:    Normocephalic, without obvious abnormality, atraumatic   Eyes:    PERRL, conjunctiva/corneas clear, EOM's intact, fundi     benign, both eyes   Ears:    Normal TM's and external ear canals, both ears   Nose:   Nares normal, septum midline, mucosa normal, no drainage    or sinus tenderness   Throat:   Lips, mucosa, and tongue normal; teeth and gums normal   Neck:   Supple, symmetrical, trachea midline, no adenopathy;     thyroid:  no enlargement/tenderness/nodules; no carotid    bruit or JVD   Back:     Symmetric, no curvature, ROM normal, no CVA tenderness   Lungs:     Clear to auscultation bilaterally, " respirations unlabored   Chest Wall:    No tenderness or deformity    Heart:    Regular rate and rhythm, S1 and S2 normal, no murmur, rub   or gallop   Breast Exam:    No tenderness, masses, or nipple abnormality   Abdomen:     Soft, non-tender, bowel sounds active all four quadrants,     no masses, no organomegaly   Genitalia:    Normal female without lesion, discharge or tenderness   Rectal:    Normal tone, no masses or tenderness;    guaiac negative stool   Extremities:   Extremities normal, atraumatic, no cyanosis or edema   Pulses:   2+ and symmetric all extremities   Skin:   Skin color, texture, turgor normal, no rashes or lesions   Lymph nodes:   Cervical, supraclavicular, and axillary nodes normal   Neurologic:   CNII-XII intact, normal strength, sensation and reflexes     throughout       Predictors of intubation difficulty:   Morbid obesity? no   Anatomically abnormal facies? no   Prominent incisors? no   Receding mandible? no   Short, thick neck? no   Neck range of motion: normal   Mallampati score: I (soft palate, uvula, fauces, and tonsillar pillars visible)   Thyromental distance: Normal    Mouth opening: Normal   Dentition: Posterior, upper and lower teeth removed for placement of implants     Cardiographics  ECG: normal sinus rhythm, no blocks or conduction defects, no ischemic changes  Echocardiogram: not done    Imaging  Chest x-ray: normal     Lab Review   Lab Results   Component Value Date     06/22/2022    K 3.7 06/22/2022     06/22/2022    CO2 25 06/22/2022    BUN 4 (L) 06/22/2022    CREATININE 0.7 06/22/2022    CALCIUM 9.2 06/22/2022     Lab Results   Component Value Date    WBC 9.81 06/22/2022    HGB 13.1 06/22/2022    HCT 42.0 06/22/2022    MCV 94 06/22/2022     06/22/2022     Lab Results   Component Value Date    CHOL 212 (H) 12/03/2021    TRIG 94 12/03/2021    HDL 47 12/03/2021         Assessment:        41 y.o. female with planned surgery as above.    Known risk factors  for perioperative complications: None    Difficulty with intubation is not anticipated.    Cardiac Risk Estimation: The 10-year ASCVD risk score (Uma FERRARI Jr., et al., 2013) is: 3.5%    Values used to calculate the score:      Age: 41 years      Sex: Female      Is Non- : No      Diabetic: No      Tobacco smoker: Yes      Systolic Blood Pressure: 124 mmHg      Is BP treated: No      HDL Cholesterol: 47 mg/dL      Total Cholesterol: 212 mg/dL      Current medications which may produce withdrawal symptoms if withheld perioperatively: None      Plan:      1. Preoperative workup as follows chest x-ray, hemoglobin, hematocrit, electrolytes, creatinine, glucose, coagulation studies, urinalysis (urinary tract instrumentation planned).  2. Patient optimized surgery

## 2022-06-21 NOTE — H&P (VIEW-ONLY)
Subjective:      Alton Bach is a 41 y.o. female who presents to the office today for a preoperative consultation at the request of surgeon Dr. Haresh Porter who plans on performing LIPOSUCTION, WITH FAT TRANSFER. BILATERAL on July 5, 2022 This consultation is requested for the specific conditions prompting preoperative evaluation (i.e. because of potential affect on operative risk): None. Planned anesthesia: general. The patient has the following known anesthesia issues: None. Patients bleeding risk: None. Patient does not have objections to receiving blood products if needed.    The following portions of the patient's history were reviewed and updated as appropriate:   She  has a past medical history of DCIS (ductal carcinoma in situ).  She does not have any pertinent problems on file.  She  has a past surgical history that includes Cholecystectomy; breast augmentation; Breast biopsy (Right); Mastectomy (Bilateral, 10/12/2020); [place] lymph node biopsy (Bilateral, 10/12/2020); Replacement of implant of breast (Bilateral, 10/12/2020); and Replacement of implant of breast (Left, 11/4/2020).  Her family history includes Heart disease in her father; Hypertension in her mother.  She  reports that she has been smoking. She has been smoking about 0.50 packs per day. She has never used smokeless tobacco. She reports that she does not drink alcohol and does not use drugs.  She has a current medication list which includes the following prescription(s): ibuprofen, one daily multivitamin, vitamin b complex, and procerv hp, and the following Facility-Administered Medications: heparin (porcine) and sodium chloride 0.9%.  Current Outpatient Medications on File Prior to Visit   Medication Sig Dispense Refill    ibuprofen (ADVIL,MOTRIN) 600 MG tablet Take 1 tablet (600 mg total) by mouth 3 (three) times daily. 30 tablet 0    multivitamin (ONE DAILY MULTIVITAMIN) per tablet Take 1 tablet by mouth once  "daily.       VITAMIN B COMPLEX ORAL Take 1 tablet by mouth.      mv-mn-iron-folic acid-K-grntea (PROCERV HP) 9 mg iron- 300 mcg-50 mcg Tab Take 3 capsules by mouth once daily.       Current Facility-Administered Medications on File Prior to Visit   Medication Dose Route Frequency Provider Last Rate Last Admin    heparin (porcine) injection 5,000 Units  5,000 Units Subcutaneous Once Karen Garcia MD        sodium chloride 0.9% flush 10 mL  10 mL Intravenous PRN Karen Garcia MD         She is allergic to ciprofloxacin, cleocin [clindamycin hcl], coconut, dye, and strawberry..    Review of Systems  Constitutional: negative  Eyes: negative  Ears, nose, mouth, throat, and face: negative  Respiratory: negative  Cardiovascular: negative  Gastrointestinal: negative  Genitourinary:negative  Integument/breast: negative  Hematologic/lymphatic: negative  Musculoskeletal:negative  Neurological: negative  Behavioral/Psych: negative  Endocrine: negative  Allergic/Immunologic: negative  Ciprofloxacin, Clindamycin, Coconut, Dye and Strawberries      Objective:        /80   Pulse 85   Temp 98.2 °F (36.8 °C) (Oral)   Ht 5' 7" (1.702 m)   Wt 66 kg (145 lb 8.1 oz)   LMP 06/16/2022 (Approximate)   SpO2 99%   BMI 22.79 kg/m²     General Appearance:    Alert, cooperative, no distress, appears stated age   Head:    Normocephalic, without obvious abnormality, atraumatic   Eyes:    PERRL, conjunctiva/corneas clear, EOM's intact, fundi     benign, both eyes   Ears:    Normal TM's and external ear canals, both ears   Nose:   Nares normal, septum midline, mucosa normal, no drainage    or sinus tenderness   Throat:   Lips, mucosa, and tongue normal; teeth and gums normal   Neck:   Supple, symmetrical, trachea midline, no adenopathy;     thyroid:  no enlargement/tenderness/nodules; no carotid    bruit or JVD   Back:     Symmetric, no curvature, ROM normal, no CVA tenderness   Lungs:     Clear to auscultation bilaterally, " respirations unlabored   Chest Wall:    No tenderness or deformity    Heart:    Regular rate and rhythm, S1 and S2 normal, no murmur, rub   or gallop   Breast Exam:    No tenderness, masses, or nipple abnormality   Abdomen:     Soft, non-tender, bowel sounds active all four quadrants,     no masses, no organomegaly   Genitalia:    Normal female without lesion, discharge or tenderness   Rectal:    Normal tone, no masses or tenderness;    guaiac negative stool   Extremities:   Extremities normal, atraumatic, no cyanosis or edema   Pulses:   2+ and symmetric all extremities   Skin:   Skin color, texture, turgor normal, no rashes or lesions   Lymph nodes:   Cervical, supraclavicular, and axillary nodes normal   Neurologic:   CNII-XII intact, normal strength, sensation and reflexes     throughout       Predictors of intubation difficulty:   Morbid obesity? no   Anatomically abnormal facies? no   Prominent incisors? no   Receding mandible? no   Short, thick neck? no   Neck range of motion: normal   Mallampati score: I (soft palate, uvula, fauces, and tonsillar pillars visible)   Thyromental distance: Normal    Mouth opening: Normal   Dentition: Posterior, upper and lower teeth removed for placement of implants     Cardiographics  ECG: normal sinus rhythm, no blocks or conduction defects, no ischemic changes  Echocardiogram: not done    Imaging  Chest x-ray: normal     Lab Review   Lab Results   Component Value Date     06/22/2022    K 3.7 06/22/2022     06/22/2022    CO2 25 06/22/2022    BUN 4 (L) 06/22/2022    CREATININE 0.7 06/22/2022    CALCIUM 9.2 06/22/2022     Lab Results   Component Value Date    WBC 9.81 06/22/2022    HGB 13.1 06/22/2022    HCT 42.0 06/22/2022    MCV 94 06/22/2022     06/22/2022     Lab Results   Component Value Date    CHOL 212 (H) 12/03/2021    TRIG 94 12/03/2021    HDL 47 12/03/2021         Assessment:        41 y.o. female with planned surgery as above.    Known risk factors  for perioperative complications: None    Difficulty with intubation is not anticipated.    Cardiac Risk Estimation: The 10-year ASCVD risk score (Uma FERRARI Jr., et al., 2013) is: 3.5%    Values used to calculate the score:      Age: 41 years      Sex: Female      Is Non- : No      Diabetic: No      Tobacco smoker: Yes      Systolic Blood Pressure: 124 mmHg      Is BP treated: No      HDL Cholesterol: 47 mg/dL      Total Cholesterol: 212 mg/dL      Current medications which may produce withdrawal symptoms if withheld perioperatively: None      Plan:      1. Preoperative workup as follows chest x-ray, hemoglobin, hematocrit, electrolytes, creatinine, glucose, coagulation studies, urinalysis (urinary tract instrumentation planned).  2. Patient optimized surgery

## 2022-06-22 ENCOUNTER — HOSPITAL ENCOUNTER (OUTPATIENT)
Dept: RADIOLOGY | Facility: HOSPITAL | Age: 42
Discharge: HOME | End: 2022-06-22
Attending: NURSE PRACTITIONER
Payer: COMMERCIAL

## 2022-06-22 DIAGNOSIS — Z01.818 PRE-OPERATIVE GENERAL PHYSICAL EXAMINATION: ICD-10-CM

## 2022-06-22 PROCEDURE — 71046 X-RAY EXAM CHEST 2 VIEWS: CPT | Mod: TC,FY,PO

## 2022-06-22 PROCEDURE — 71046 XR CHEST PA AND LATERAL: ICD-10-PCS | Mod: 26,,, | Performed by: RADIOLOGY

## 2022-06-22 PROCEDURE — 71046 X-RAY EXAM CHEST 2 VIEWS: CPT | Mod: 26,,, | Performed by: RADIOLOGY

## 2022-06-27 NOTE — ANESTHESIA PAT ROS NOTE
06/27/2022  Alton Bach is a 41 y.o., female.      Pre-op Assessment          Review of Systems  Anesthesia Hx:  No problems with previous Anesthesia  History of prior surgery of interest to airway management or planning: Previous anesthesia: General replacement of implant left breast with general anesthesia.    Social:  No Alcohol Use, Smoker    Hematology/Oncology:  Hematology Normal       -- Cancer in past history:  surgery  Oncology Comments: Breast cancer     EENT/Dental:EENT/Dental Normal   Cardiovascular:   Exercise tolerance: good    Pulmonary:   smoker   Renal/:  Renal/ Normal     Hepatic/GI:  Hepatic/GI Normal    Musculoskeletal:  Musculoskeletal Normal    Neurological:  Neurology Normal    Endocrine:  Endocrine Normal    Dermatological:   Hx breast cancer          Anesthesia Assessment: Preoperative EQUATION    Planned Procedure: Procedure(s) (LRB):  LIPOSUCTION, WITH FAT TRANSFER (Bilateral)  Requested Anesthesia Type:General  Surgeon: Haresh Porter MD  Service: Plastics  Known or anticipated Date of Surgery:7/5/2022    Surgeon notes: reviewed    Electronic QUestionnaire Assessment completed via nurse interview with patient.        Triage considerations:     The patient has no apparent active cardiac condition (No unstable coronary Syndrome such as severe unstable angina or recent [<1 month] myocardial infarction, decompensated CHF, severe valvular   disease or significant arrhythmia)    [facility]    Last PCP note: within 1 month , within [facility]  Subspecialty notes: Dermatology, Hematology/Oncology, plastic surgery  // breast surgery    Other important co-morbidities: Smoker and hx breast cancer      Tests already available:  Available tests,  within 1 month , within [facility] .             Instructions given. (See in Nurse's  note)    Optimization:  Pt optimized by PCP // current labs    Plan:    Testing:  None Needed   Pre-anesthesia  visit       Visit focus: none     Consultation:Patient's PCP for a statement of optimization -- done       Navigation:                  Straight Line to surgery.               No tests, anesthesia preop clinic visit, or consult required.

## 2022-06-30 ENCOUNTER — PATIENT MESSAGE (OUTPATIENT)
Dept: SURGERY | Facility: HOSPITAL | Age: 42
End: 2022-06-30
Payer: COMMERCIAL

## 2022-07-01 ENCOUNTER — TELEPHONE (OUTPATIENT)
Dept: PLASTIC SURGERY | Facility: CLINIC | Age: 42
End: 2022-07-01
Payer: COMMERCIAL

## 2022-07-01 NOTE — TELEPHONE ENCOUNTER
Contacted pt to discuss surgery arrival time for procedure on Tuesday, July 5.  Pt advised to arrive on the 2nd floor [facility] for 8:00a.  Pt reminded not to eat or drink after midnight.  Pt verbalized understanding.

## 2022-07-03 NOTE — ANESTHESIA PREPROCEDURE EVALUATION
[facility]  Anesthesia Pre-Operative Evaluation         Patient Name: Alton Bach  YOB: 1980  MRN: [id number]    SUBJECTIVE:     Pre-operative evaluation for Procedure(s) (LRB):  LIPOSUCTION, WITH FAT TRANSFER (Bilateral)     07/02/2022    Alton Bach is a 41 y.o. female w/ a significant PMHx of DCIS. Patient had Breast biopsy (Right); Mastectomy (Bilateral, 10/12/2020); [place] lymph node biopsy (Bilateral, 10/12/2020); Replacement of implant of breast (Bilateral, 10/12/2020); and Replacement of implant of breast (Left, 11/4/2020).    Patient now presents for the above procedure(s).      LDA: None.    Prev airway: Intubation  Performed by: Rosemary Morris CRNA  Authorized by: Rosemary Morris CRNA      Intubation:     Induction:  Intravenous    Intubated:  Postinduction    Mask Ventilation:  Easy with oral airway    Attempts:  1    Attempted By:  CRNA    Method of Intubation:  Direct    Blade:  Jennings 2    Laryngeal View Grade: Grade IIA - cords partially seen      Difficult Airway Encountered?: No      Complications:  None    Airway Device:  Oral endotracheal tube    Airway Device Size:  7.5    Style/Cuff Inflation:  Cuffed (inflated to minimal occlusive pressure)    Tube secured:  21    Secured at:  The lips    Placement Verified By:  Capnometry    Complicating Factors:  Anterior larynx    Findings Post-Intubation:  BS equal bilateral       Drips: None.      Patient Active Problem List   Diagnosis    Groin strain    Ductal carcinoma in situ (DCIS) of left breast    Breast cancer       Review of patient's allergies indicates:   Allergen Reactions    Ciprofloxacin Hives, Shortness Of Breath and Itching    Cleocin [clindamycin hcl] Hives and Swelling    Coconut Swelling and Rash    Dye Swelling and Blisters     Hair Dye    Strawberry Swelling and Rash    Bacitracin Rash       Current Inpatient Medications:      Current Facility-Administered  Medications on File Prior to Encounter   Medication Dose Route Frequency Provider Last Rate Last Admin    heparin (porcine) injection 5,000 Units  5,000 Units Subcutaneous Once Karen Garcia MD        sodium chloride 0.9% flush 10 mL  10 mL Intravenous PRN Karen Garcia MD         Current Outpatient Medications on File Prior to Encounter   Medication Sig Dispense Refill    BIOTIN ORAL Take by mouth.      ibuprofen (ADVIL,MOTRIN) 600 MG tablet Take 1 tablet (600 mg total) by mouth 3 (three) times daily. 30 tablet 0    VITAMIN B COMPLEX ORAL Take 1 tablet by mouth.         Past Surgical History:   Procedure Laterality Date    breast augmentation      BREAST BIOPSY Right     CHOLECYSTECTOMY      MASTECTOMY Bilateral 10/12/2020    Procedure: MASTECTOMY-Bilateral ;  Surgeon: Anahi Huitron MD;  Location: [address];  Service: General;  Laterality: Bilateral;    REPLACEMENT OF IMPLANT OF BREAST Bilateral 10/12/2020    Procedure: REPLACEMENT, IMPLANT, BREAST-Bilateral;  Surgeon: Haresh Porter MD;  Location: [address];  Service: Plastics;  Laterality: Bilateral;    REPLACEMENT OF IMPLANT OF BREAST Left 11/4/2020    Procedure: Left Breast Implant Exchange;  Surgeon: Haresh Porter MD;  Location: [address];  Service: Plastics;  Laterality: Left;    SENTINEL LYMPH NODE BIOPSY Bilateral 10/12/2020    Procedure: BIOPSY, LYMPH NODE, SENTINEL-Bilateral;  Surgeon: Anahi Huitron MD;  Location: [address];  Service: General;  Laterality: Bilateral;       Social History     Socioeconomic History    Marital status: Single   Tobacco Use    Smoking status: Current Every Day Smoker     Packs/day: 0.50    Smokeless tobacco: Never Used   Substance and Sexual Activity    Alcohol use: No    Drug use: No       OBJECTIVE:     Vital Signs Range (Last 24H):         Significant Labs:  Lab Results   Component Value Date    WBC 9.81 06/22/2022    HGB 13.1 06/22/2022    HCT 42.0 06/22/2022      06/22/2022    CHOL 212 (H) 12/03/2021    TRIG 94 12/03/2021    HDL 47 12/03/2021    ALT 17 12/03/2021    AST 13 12/03/2021     06/22/2022    K 3.7 06/22/2022     06/22/2022    CREATININE 0.7 06/22/2022    BUN 4 (L) 06/22/2022    CO2 25 06/22/2022    TSH 0.516 12/03/2021    INR 1.0 06/22/2022       Diagnostic Studies: No relevant studies.    EKG:   Results for orders placed or performed in visit on 06/21/22   IN OFFICE EKG 12-LEAD (to [email or web address])    Collection Time: 06/21/22  4:27 PM    Narrative    Test Reason : Z01.818,    Vent. Rate : 080 BPM     Atrial Rate : 080 BPM     P-R Int : 174 ms          QRS Dur : 080 ms      QT Int : 374 ms       P-R-T Axes : 043 049 071 degrees     QTc Int : 431 ms    Normal sinus rhythm  Nonspecific T wave abnormality  Abnormal ECG  No previous ECGs available  Confirmed by Livan Alba MD ([id number]) on 6/22/2022 2:58:01 PM    Referred By:  ARACELI LOPEZ           Confirmed By:Livan Alba MD       2D ECHO:  TTE:  No results found for this or any previous visit.    FELICIANO:  No results found for this or any previous visit.    ASSESSMENT/PLAN:           Pre-op Assessment    I have reviewed the Patient Summary Reports.     I have reviewed the Nursing Notes. I have reviewed the NPO Status.   I have reviewed the Medications.     Review of Systems  Anesthesia Hx:  No problems with previous Anesthesia Denies Hx of Anesthetic complications  History of prior surgery of interest to airway management or planning: Previous anesthesia: General Denies Family Hx of Anesthesia complications.   Denies Personal Hx of Anesthesia complications.   Hematology/Oncology:        Current/Recent Cancer. Breast surgery   Cardiovascular:   Denies Hypertension.  Denies CAD.    Denies CABG/stent.   Denies CHF. no hyperlipidemia    Pulmonary:   Denies COPD.  Denies Asthma.  Denies Sleep Apnea.    Renal/:   Denies Chronic Renal Disease.     Hepatic/GI:   Denies GERD.    Neurological:   Denies CVA.  Denies  Headaches. Denies Seizures.    Endocrine:   Denies Diabetes.    Psych:   Denies Psychiatric History.          Physical Exam  General: Well nourished, Cooperative, Alert and Oriented    Airway:  Mallampati: II / II  Mouth Opening: Normal  TM Distance: Normal  Tongue: Normal  Neck ROM: Normal ROM    Dental:  Intact        Anesthesia Plan  Type of Anesthesia, risks & benefits discussed:    Anesthesia Type: Gen ETT  Intra-op Monitoring Plan: Standard ASA Monitors and Art Line  Post Op Pain Control Plan: multimodal analgesia and IV/PO Opioids PRN  Induction:  IV  Airway Plan: Direct  Informed Consent: Informed consent signed with the Patient and all parties understand the risks and agree with anesthesia plan.  All questions answered. Patient consented to blood products? Yes  ASA Score: 2  Day of Surgery Review of History & Physical: H&P Update referred to the surgeon/provider.    Ready For Surgery From Anesthesia Perspective.     .

## 2022-07-05 ENCOUNTER — PATIENT MESSAGE (OUTPATIENT)
Dept: SURGERY | Facility: HOSPITAL | Age: 42
End: 2022-07-05

## 2022-07-05 ENCOUNTER — HOSPITAL ENCOUNTER (OUTPATIENT)
Facility: HOSPITAL | Age: 42
Discharge: HOME | End: 2022-07-05
Attending: SURGERY | Admitting: SURGERY
Payer: COMMERCIAL

## 2022-07-05 DIAGNOSIS — Z98.82 HISTORY OF BILATERAL BREAST IMPLANTS: Primary | ICD-10-CM

## 2022-07-05 LAB
B-HCG UR QL: NEGATIVE
CTP QC/QA: YES
CTP QC/QA: YES
SARS-COV-2 AG RESP QL IA.RAPID: NEGATIVE

## 2022-07-05 PROCEDURE — 63600175 PHARM REV CODE 636 W HCPCS: Performed by: SURGERY

## 2022-07-05 PROCEDURE — 63600175 PHARM REV CODE 636 W HCPCS

## 2022-07-05 PROCEDURE — 71000015 HC POSTOP RECOV 1ST HR: Performed by: SURGERY

## 2022-07-05 PROCEDURE — 81025 URINE PREGNANCY TEST: CPT | Performed by: SURGERY

## 2022-07-05 PROCEDURE — 19380 REVJ RECONSTRUCTED BREAST: CPT | Mod: LT,,, | Performed by: SURGERY

## 2022-07-05 PROCEDURE — 25000003 PHARM REV CODE 250

## 2022-07-05 PROCEDURE — D9220A PRA ANESTHESIA: Mod: ,,, | Performed by: ANESTHESIOLOGY

## 2022-07-05 PROCEDURE — 36000707: Performed by: SURGERY

## 2022-07-05 PROCEDURE — 71000045 HC DOSC ROUTINE RECOVERY EA ADD'L HR: Performed by: SURGERY

## 2022-07-05 PROCEDURE — 36000706: Performed by: SURGERY

## 2022-07-05 PROCEDURE — D9220A PRA ANESTHESIA: ICD-10-PCS | Mod: ,,, | Performed by: ANESTHESIOLOGY

## 2022-07-05 PROCEDURE — 19380 PR REVISE BREAST RECONSTRUCTION: ICD-10-PCS | Mod: LT,,, | Performed by: SURGERY

## 2022-07-05 PROCEDURE — 37000008 HC ANESTHESIA 1ST 15 MINUTES: Performed by: SURGERY

## 2022-07-05 PROCEDURE — 37000009 HC ANESTHESIA EA ADD 15 MINS: Performed by: SURGERY

## 2022-07-05 PROCEDURE — 71000016 HC POSTOP RECOV ADDL HR: Performed by: SURGERY

## 2022-07-05 PROCEDURE — 25000003 PHARM REV CODE 250: Performed by: STUDENT IN AN ORGANIZED HEALTH CARE EDUCATION/TRAINING PROGRAM

## 2022-07-05 PROCEDURE — 71000044 HC DOSC ROUTINE RECOVERY FIRST HOUR: Performed by: SURGERY

## 2022-07-05 RX ORDER — CEFAZOLIN SODIUM 1 G/3ML
INJECTION, POWDER, FOR SOLUTION INTRAMUSCULAR; INTRAVENOUS
Status: DISCONTINUED | OUTPATIENT
Start: 2022-07-05 | End: 2022-07-05 | Stop reason: HOSPADM

## 2022-07-05 RX ORDER — KETAMINE HCL IN 0.9 % NACL 50 MG/5 ML
SYRINGE (ML) INTRAVENOUS
Status: DISCONTINUED | OUTPATIENT
Start: 2022-07-05 | End: 2022-07-05

## 2022-07-05 RX ORDER — OXYCODONE AND ACETAMINOPHEN 5; 325 MG/1; MG/1
1 TABLET ORAL EVERY 4 HOURS PRN
Qty: 28 TABLET | Refills: 0 | Status: SHIPPED | OUTPATIENT
Start: 2022-07-05 | End: 2022-07-05 | Stop reason: SDUPTHER

## 2022-07-05 RX ORDER — HYDROCODONE BITARTRATE AND ACETAMINOPHEN 5; 325 MG/1; MG/1
1 TABLET ORAL EVERY 4 HOURS PRN
Status: DISCONTINUED | OUTPATIENT
Start: 2022-07-05 | End: 2022-07-05 | Stop reason: HOSPADM

## 2022-07-05 RX ORDER — DEXAMETHASONE SODIUM PHOSPHATE 4 MG/ML
INJECTION, SOLUTION INTRA-ARTICULAR; INTRALESIONAL; INTRAMUSCULAR; INTRAVENOUS; SOFT TISSUE
Status: DISCONTINUED | OUTPATIENT
Start: 2022-07-05 | End: 2022-07-05

## 2022-07-05 RX ORDER — OXYCODONE AND ACETAMINOPHEN 5; 325 MG/1; MG/1
1 TABLET ORAL EVERY 4 HOURS PRN
Qty: 28 TABLET | Refills: 0 | Status: SHIPPED | OUTPATIENT
Start: 2022-07-05 | End: 2022-07-12

## 2022-07-05 RX ORDER — PROPOFOL 10 MG/ML
VIAL (ML) INTRAVENOUS
Status: DISCONTINUED | OUTPATIENT
Start: 2022-07-05 | End: 2022-07-05

## 2022-07-05 RX ORDER — LIDOCAINE HYDROCHLORIDE 20 MG/ML
INJECTION, SOLUTION EPIDURAL; INFILTRATION; INTRACAUDAL; PERINEURAL
Status: DISCONTINUED | OUTPATIENT
Start: 2022-07-05 | End: 2022-07-05

## 2022-07-05 RX ORDER — HYDROMORPHONE HYDROCHLORIDE 1 MG/ML
0.2 INJECTION, SOLUTION INTRAMUSCULAR; INTRAVENOUS; SUBCUTANEOUS
Status: DISCONTINUED | OUTPATIENT
Start: 2022-07-05 | End: 2022-07-05 | Stop reason: HOSPADM

## 2022-07-05 RX ORDER — CEPHALEXIN 500 MG/1
500 CAPSULE ORAL EVERY 8 HOURS
Qty: 21 CAPSULE | Refills: 0 | Status: SHIPPED | OUTPATIENT
Start: 2022-07-05 | End: 2022-07-05 | Stop reason: SDUPTHER

## 2022-07-05 RX ORDER — HALOPERIDOL 5 MG/ML
0.5 INJECTION INTRAMUSCULAR EVERY 10 MIN PRN
Status: DISCONTINUED | OUTPATIENT
Start: 2022-07-05 | End: 2022-07-05 | Stop reason: HOSPADM

## 2022-07-05 RX ORDER — SODIUM CHLORIDE 0.9 % (FLUSH) 0.9 %
10 SYRINGE (ML) INJECTION
Status: DISCONTINUED | OUTPATIENT
Start: 2022-07-05 | End: 2022-07-05 | Stop reason: HOSPADM

## 2022-07-05 RX ORDER — ACETAMINOPHEN 500 MG
1000 TABLET ORAL ONCE
Status: COMPLETED | OUTPATIENT
Start: 2022-07-05 | End: 2022-07-05

## 2022-07-05 RX ORDER — MUPIROCIN 20 MG/G
OINTMENT TOPICAL 2 TIMES DAILY
Status: DISCONTINUED | OUTPATIENT
Start: 2022-07-05 | End: 2022-07-05 | Stop reason: HOSPADM

## 2022-07-05 RX ORDER — CEPHALEXIN 500 MG/1
500 CAPSULE ORAL EVERY 8 HOURS
Qty: 21 CAPSULE | Refills: 0 | Status: SHIPPED | OUTPATIENT
Start: 2022-07-05 | End: 2022-07-06 | Stop reason: SINTOL

## 2022-07-05 RX ORDER — FENTANYL CITRATE 50 UG/ML
INJECTION, SOLUTION INTRAMUSCULAR; INTRAVENOUS
Status: DISCONTINUED | OUTPATIENT
Start: 2022-07-05 | End: 2022-07-05

## 2022-07-05 RX ORDER — CEFAZOLIN SODIUM/WATER 2 G/20 ML
2 SYRINGE (ML) INTRAVENOUS
Status: COMPLETED | OUTPATIENT
Start: 2022-07-05 | End: 2022-07-05

## 2022-07-05 RX ORDER — MIDAZOLAM HYDROCHLORIDE 1 MG/ML
INJECTION, SOLUTION INTRAMUSCULAR; INTRAVENOUS
Status: DISCONTINUED | OUTPATIENT
Start: 2022-07-05 | End: 2022-07-05

## 2022-07-05 RX ORDER — ROCURONIUM BROMIDE 10 MG/ML
INJECTION, SOLUTION INTRAVENOUS
Status: DISCONTINUED | OUTPATIENT
Start: 2022-07-05 | End: 2022-07-05

## 2022-07-05 RX ADMIN — HYDROMORPHONE HYDROCHLORIDE 0.2 MG: 1 INJECTION, SOLUTION INTRAMUSCULAR; INTRAVENOUS; SUBCUTANEOUS at 01:07

## 2022-07-05 RX ADMIN — LIDOCAINE HYDROCHLORIDE 80 MG: 20 INJECTION, SOLUTION EPIDURAL; INFILTRATION; INTRACAUDAL at 10:07

## 2022-07-05 RX ADMIN — DEXAMETHASONE SODIUM PHOSPHATE 4 MG: 4 INJECTION INTRA-ARTICULAR; INTRALESIONAL; INTRAMUSCULAR; INTRAVENOUS; SOFT TISSUE at 10:07

## 2022-07-05 RX ADMIN — ACETAMINOPHEN 1000 MG: 500 TABLET ORAL at 08:07

## 2022-07-05 RX ADMIN — Medication 10 MG: at 11:07

## 2022-07-05 RX ADMIN — Medication 20 MG: at 11:07

## 2022-07-05 RX ADMIN — FENTANYL CITRATE 100 MCG: 50 INJECTION INTRAMUSCULAR; INTRAVENOUS at 10:07

## 2022-07-05 RX ADMIN — PROPOFOL 200 MG: 10 INJECTION, EMULSION INTRAVENOUS at 10:07

## 2022-07-05 RX ADMIN — Medication 2 G: at 10:07

## 2022-07-05 RX ADMIN — MIDAZOLAM 2 MG: 1 INJECTION INTRAMUSCULAR; INTRAVENOUS at 10:07

## 2022-07-05 RX ADMIN — ROCURONIUM BROMIDE 10 MG: 10 INJECTION INTRAVENOUS at 11:07

## 2022-07-05 RX ADMIN — HYDROCODONE BITARTRATE AND ACETAMINOPHEN 1 TABLET: 5; 325 TABLET ORAL at 01:07

## 2022-07-05 RX ADMIN — ROCURONIUM BROMIDE 40 MG: 10 INJECTION INTRAVENOUS at 10:07

## 2022-07-05 RX ADMIN — SODIUM CHLORIDE: 9 INJECTION, SOLUTION INTRAVENOUS at 09:07

## 2022-07-05 NOTE — ANESTHESIA PROCEDURE NOTES
Intubation    Date/Time: 7/5/2022 10:32 AM  Performed by: Adam White MD  Authorized by: Kwesi Del Angel MD     Intubation:     Induction:  Intravenous    Intubated:  Postinduction    Mask Ventilation:  Easy mask    Attempts:  1    Attempted By:  Resident anesthesiologist    Method of Intubation:  Direct    Blade:  Sincere 3    Laryngeal View Grade: Grade IIA - cords partially seen      Difficult Airway Encountered?: No      Complications:  None    Airway Device:  Oral endotracheal tube    Airway Device Size:  7.5    Style/Cuff Inflation:  Cuffed (inflated to minimal occlusive pressure)    Tube secured:  21    Secured at:  The lips    Placement Verified By:  Capnometry    Complicating Factors:  Anterior larynx    Findings Post-Intubation:  BS equal bilateral and atraumatic/condition of teeth unchanged

## 2022-07-05 NOTE — BRIEF OP NOTE
[facility] - Surgery ([facility])  Brief Operative Note    Surgery Date: 7/5/2022     Surgeon(s) and Role:     * Haresh Porter MD - Primary     * Kirk Barnes MD - Resident - Assisting         Pre-op Diagnosis:  Hx of breast cancer [Z85.3]    Post-op Diagnosis:  Post-Op Diagnosis Codes:     * Hx of breast cancer [Z85.3]    Procedure(s) (LRB):  REVISION OF PROCEDURE INVOLVING BREAST IMPLANT (Left)  CAPSULOTOMY, BREAST (Left)  CAPSULORRHAPHY (Left)    Anesthesia: General    Operative Findings: capsular contraction of right breast    Estimated Blood Loss: 50cc         Specimens:   Specimen (24h ago, onward)            None            Discharge Note    OUTCOME: Patient tolerated treatment/procedure well without complication and is now ready for discharge.    DISPOSITION: Home or Self Care    FINAL DIAGNOSIS:  <principal problem not specified>    FOLLOWUP: In clinic    DISCHARGE INSTRUCTIONS:    Discharge Procedure Orders   Diet general     Remove dressing in 48 hours     Call MD for:  severe uncontrolled pain     Call MD for:  temperature >100.4     Call MD for:  persistent nausea and vomiting     Call MD for:  difficulty breathing, headache or visual disturbances     Call MD for:  redness, tenderness, or signs of infection (pain, swelling, redness, odor or green/yellow discharge around incision site)     Call MD for:  hives     Call MD for:  persistent dizziness or light-headedness

## 2022-07-05 NOTE — OP NOTE
Date of surgery 07/05/2022  Preoperative diagnosis history of breast cancer  Postoperative diagnosis the same  Procedure performed  1. Recreation of left inframammary fold  2. Inferior capsulorrhaphy in  Surgeon German  Anesthesia general  Complications none  Blood loss minimal  Drains none    The patient was evaluated the preoperative holding area.  Standing position was clear that the lack of volume superiorly was due to the fact that the inframammary fold was lower on the left side than on the right side.  Thus the initial plan to fat graft was aborted and go ahead and perform an inferior capsulorrhaphy raising the inframammary fold up proximally 1/2 cm.  Patient was taken to the operative room placed in supine position after adequate general endotracheal anesthesia was prepped and draped in a normal sterile fashion previous mastectomy incision was then opened.  Dissection was then proceeded down to the inframammary fold.  The inframammary fold was then tacked with interrupted 2-0 Prolene sutures.  Patient was sat up multiple times with the implant in position to determine if the inframammary fold was at the proper level.  Once this was achieved several more interrupted 2-0 Prolenes were placed followed by a running 2-0 Prolene suture to secure the inframammary fold.  Thus completing the inferior capsulorrhaphy.  Next, pocket was irrigated with copious amounts of antibiotic solution the implant was replaced.  Capsule was closed using running 2-0 Vicryl the skin using 3-0 Monocryl followed by running 4-0 Monocryl subcuticular suture.  There were no complications.

## 2022-07-05 NOTE — TRANSFER OF CARE
"Anesthesia Transfer of Care Note    Patient: Alton Bach    Procedure(s) Performed: Procedure(s) (LRB):  REVISION OF PROCEDURE INVOLVING BREAST IMPLANT (Left)  CAPSULOTOMY, BREAST (Left)  CAPSULORRHAPHY (Left)    Patient location: PACU    Anesthesia Type: general    Transport from OR: Transported from OR on 6-10 L/min O2 by face mask with adequate spontaneous ventilation    Post pain: adequate analgesia    Post assessment: no apparent anesthetic complications    Post vital signs: stable    Level of consciousness: awake and alert    Nausea/Vomiting: no nausea/vomiting    Complications: none    Transfer of care protocol was followed      Last vitals:   Visit Vitals  /83   Temp 36.6 °C (97.8 °F) (Oral)   Resp 16   Ht 5' 7" (1.702 m)   Wt 65.8 kg (145 lb)   LMP 06/16/2022 (Approximate)   SpO2 100%   Breastfeeding No   BMI 22.71 kg/m²     "

## 2022-07-05 NOTE — PROGRESS NOTES
Discharge instructions reviewed w/ pt and family, verbalized understanding. Pt in NADN. Pain tolerable to go home w/. Tolerated liquids w/ no issues. To be d/c'd home w/ family.

## 2022-07-06 ENCOUNTER — PATIENT MESSAGE (OUTPATIENT)
Dept: SURGERY | Facility: HOSPITAL | Age: 42
End: 2022-07-06
Payer: COMMERCIAL

## 2022-07-06 VITALS
BODY MASS INDEX: 22.76 KG/M2 | RESPIRATION RATE: 16 BRPM | TEMPERATURE: 98 F | DIASTOLIC BLOOD PRESSURE: 56 MMHG | HEART RATE: 82 BPM | OXYGEN SATURATION: 98 % | HEIGHT: 67 IN | WEIGHT: 145 LBS | SYSTOLIC BLOOD PRESSURE: 108 MMHG

## 2022-07-06 RX ORDER — SULFAMETHOXAZOLE AND TRIMETHOPRIM 800; 160 MG/1; MG/1
1 TABLET ORAL 2 TIMES DAILY
Qty: 20 TABLET | Refills: 0 | Status: SHIPPED | OUTPATIENT
Start: 2022-07-06 | End: 2022-07-16

## 2022-07-13 ENCOUNTER — OFFICE VISIT (OUTPATIENT)
Dept: PLASTIC SURGERY | Facility: CLINIC | Age: 42
End: 2022-07-13
Payer: COMMERCIAL

## 2022-07-13 VITALS
DIASTOLIC BLOOD PRESSURE: 69 MMHG | HEART RATE: 86 BPM | BODY MASS INDEX: 22.76 KG/M2 | HEIGHT: 67 IN | WEIGHT: 145 LBS | SYSTOLIC BLOOD PRESSURE: 118 MMHG

## 2022-07-13 DIAGNOSIS — Z09 SURGERY FOLLOW-UP EXAMINATION: Primary | ICD-10-CM

## 2022-07-13 PROCEDURE — 1160F RVW MEDS BY RX/DR IN RCRD: CPT | Mod: CPTII,S$GLB,, | Performed by: SURGERY

## 2022-07-13 PROCEDURE — 3008F BODY MASS INDEX DOCD: CPT | Mod: CPTII,S$GLB,, | Performed by: SURGERY

## 2022-07-13 PROCEDURE — 1160F PR REVIEW ALL MEDS BY PRESCRIBER/CLIN PHARMACIST DOCUMENTED: ICD-10-PCS | Mod: CPTII,S$GLB,, | Performed by: SURGERY

## 2022-07-13 PROCEDURE — 99999 PR PBB SHADOW E&M-EST. PATIENT-LVL III: ICD-10-PCS | Mod: PBBFAC,,, | Performed by: SURGERY

## 2022-07-13 PROCEDURE — 3078F PR MOST RECENT DIASTOLIC BLOOD PRESSURE < 80 MM HG: ICD-10-PCS | Mod: CPTII,S$GLB,, | Performed by: SURGERY

## 2022-07-13 PROCEDURE — 3078F DIAST BP <80 MM HG: CPT | Mod: CPTII,S$GLB,, | Performed by: SURGERY

## 2022-07-13 PROCEDURE — 3074F PR MOST RECENT SYSTOLIC BLOOD PRESSURE < 130 MM HG: ICD-10-PCS | Mod: CPTII,S$GLB,, | Performed by: SURGERY

## 2022-07-13 PROCEDURE — 1159F MED LIST DOCD IN RCRD: CPT | Mod: CPTII,S$GLB,, | Performed by: SURGERY

## 2022-07-13 PROCEDURE — 99024 PR POST-OP FOLLOW-UP VISIT: ICD-10-PCS | Mod: S$GLB,,, | Performed by: SURGERY

## 2022-07-13 PROCEDURE — 99024 POSTOP FOLLOW-UP VISIT: CPT | Mod: S$GLB,,, | Performed by: SURGERY

## 2022-07-13 PROCEDURE — 1159F PR MEDICATION LIST DOCUMENTED IN MEDICAL RECORD: ICD-10-PCS | Mod: CPTII,S$GLB,, | Performed by: SURGERY

## 2022-07-13 PROCEDURE — 3008F PR BODY MASS INDEX (BMI) DOCUMENTED: ICD-10-PCS | Mod: CPTII,S$GLB,, | Performed by: SURGERY

## 2022-07-13 PROCEDURE — 3074F SYST BP LT 130 MM HG: CPT | Mod: CPTII,S$GLB,, | Performed by: SURGERY

## 2022-07-13 PROCEDURE — 99999 PR PBB SHADOW E&M-EST. PATIENT-LVL III: CPT | Mod: PBBFAC,,, | Performed by: SURGERY

## 2022-07-13 RX ORDER — IBUPROFEN 800 MG/1
TABLET ORAL
COMMUNITY
Start: 2022-05-23 | End: 2023-08-04

## 2022-07-13 NOTE — PROGRESS NOTES
Plastic Surgery Clinic Postop Visit    Subjective:      Alton Bach is a 41 y.o. year old female who presents to the Plastic Surgery Clinic on 2022 for follow up visit status post left capsulotomy and capsulorraphy. She has no complaints. She does not have any pain at all. She is doing well overall. Denies fever, chills, nausea, vomiting, or other systemic signs of infection.    Vitals:    22 1211   BP: 118/69   Pulse: 86        Review of patient's allergies indicates:   Allergen Reactions    Ciprofloxacin Hives, Shortness Of Breath and Itching    Cleocin [clindamycin hcl] Hives and Swelling    Coconut Swelling and Rash    Dye Swelling and Blisters     Hair Dye    Strawberry Swelling and Rash    Bacitracin Rash       Current Outpatient Medications on File Prior to Visit   Medication Sig Dispense Refill    BIOTIN ORAL Take by mouth.      ibuprofen (ADVIL,MOTRIN) 600 MG tablet Take 1 tablet (600 mg total) by mouth 3 (three) times daily. 30 tablet 0    ibuprofen (ADVIL,MOTRIN) 800 MG tablet TAKE 1 TABLET BY MOUTH EVERY 8 HOURS AS NEEDED FOR DENTAL PAIN OR INFLAMMATION      [] oxyCODONE-acetaminophen (PERCOCET) 5-325 mg per tablet Take 1 tablet by mouth every 4 (four) hours as needed for Pain. 28 tablet 0    sulfamethoxazole-trimethoprim 800-160mg (BACTRIM DS) 800-160 mg Tab Take 1 tablet by mouth 2 (two) times daily. for 10 days 20 tablet 0    VITAMIN B COMPLEX ORAL Take 1 tablet by mouth.       Current Facility-Administered Medications on File Prior to Visit   Medication Dose Route Frequency Provider Last Rate Last Admin    heparin (porcine) injection 5,000 Units  5,000 Units Subcutaneous Once Karen Garcia MD        sodium chloride 0.9% flush 10 mL  10 mL Intravenous PRN Karen Garcia MD           Patient Active Problem List   Diagnosis    Groin strain    Ductal carcinoma in situ (DCIS) of left breast    Breast cancer       Past Surgical History:   Procedure  Laterality Date    breast augmentation      BREAST BIOPSY Right     BREAST CAPSULOTOMY Left 7/5/2022    Procedure: CAPSULOTOMY, BREAST;  Surgeon: Haresh Porter MD;  Location: [facility] OR [address] [place] [address];  Service: Plastics;  Laterality: Left;    CHOLECYSTECTOMY      MASTECTOMY Bilateral 10/12/2020    Procedure: MASTECTOMY-Bilateral ;  Surgeon: Anahi Huitron MD;  Location: [address];  Service: General;  Laterality: Bilateral;    REPLACEMENT OF IMPLANT OF BREAST Bilateral 10/12/2020    Procedure: REPLACEMENT, IMPLANT, BREAST-Bilateral;  Surgeon: Haresh Porter MD;  Location: [facility] OR [address] [place] [address];  Service: Plastics;  Laterality: Bilateral;    REPLACEMENT OF IMPLANT OF BREAST Left 11/4/2020    Procedure: Left Breast Implant Exchange;  Surgeon: Haresh Porter MD;  Location: [facility] OR [address] [place] [address];  Service: Plastics;  Laterality: Left;    REVISION OF BREAST IMPLANT Left 7/5/2022    Procedure: REVISION OF PROCEDURE INVOLVING BREAST IMPLANT;  Surgeon: Haresh Porter MD;  Location: [facility] OR [address] [place] [address];  Service: Plastics;  Laterality: Left;  lewis breast    SENTINEL LYMPH NODE BIOPSY Bilateral 10/12/2020    Procedure: BIOPSY, LYMPH NODE, SENTINEL-Bilateral;  Surgeon: Anahi Huitron MD;  Location: [address];  Service: General;  Laterality: Bilateral;       Social History     Socioeconomic History    Marital status: Single   Tobacco Use    Smoking status: Current Every Day Smoker     Packs/day: 0.50    Smokeless tobacco: Never Used   Substance and Sexual Activity    Alcohol use: No    Drug use: No           Review of Systems: negative besides HPI    Objective:     Physical Exam:  Vitals:    07/13/22 1211   BP: 118/69   Pulse: 86       WD WN NAD  VSS  Normal resp effort  Left breast - symmetric with right breast, incision c/d/i, no erythema or signs of infection        Assessment:       No diagnosis found.    Plan:   41 y.o. female status post left sided capsulotomy and capsulorraphy on  07/05/2022.   - Doing well, no issues  - Keep incision clean and dry   - Will remove tape next visit   - Return to clinic in 2 weeks      All questions were answered. The patient was advised to call the clinic with any questions or concerns prior to their next visit.       Kirk Barnes MD - PGY1

## 2022-07-27 ENCOUNTER — OFFICE VISIT (OUTPATIENT)
Dept: PLASTIC SURGERY | Facility: CLINIC | Age: 42
End: 2022-07-27
Payer: COMMERCIAL

## 2022-07-27 VITALS
BODY MASS INDEX: 22.76 KG/M2 | DIASTOLIC BLOOD PRESSURE: 72 MMHG | SYSTOLIC BLOOD PRESSURE: 120 MMHG | HEART RATE: 97 BPM | WEIGHT: 145 LBS | HEIGHT: 67 IN

## 2022-07-27 DIAGNOSIS — Z09 SURGERY FOLLOW-UP EXAMINATION: Primary | ICD-10-CM

## 2022-07-27 PROCEDURE — 1159F MED LIST DOCD IN RCRD: CPT | Mod: CPTII,S$GLB,, | Performed by: SURGERY

## 2022-07-27 PROCEDURE — 99024 POSTOP FOLLOW-UP VISIT: CPT | Mod: S$GLB,,, | Performed by: SURGERY

## 2022-07-27 PROCEDURE — 99024 PR POST-OP FOLLOW-UP VISIT: ICD-10-PCS | Mod: S$GLB,,, | Performed by: SURGERY

## 2022-07-27 PROCEDURE — 3078F PR MOST RECENT DIASTOLIC BLOOD PRESSURE < 80 MM HG: ICD-10-PCS | Mod: CPTII,S$GLB,, | Performed by: SURGERY

## 2022-07-27 PROCEDURE — 99999 PR PBB SHADOW E&M-EST. PATIENT-LVL III: CPT | Mod: PBBFAC,,, | Performed by: SURGERY

## 2022-07-27 PROCEDURE — 3074F PR MOST RECENT SYSTOLIC BLOOD PRESSURE < 130 MM HG: ICD-10-PCS | Mod: CPTII,S$GLB,, | Performed by: SURGERY

## 2022-07-27 PROCEDURE — 3008F PR BODY MASS INDEX (BMI) DOCUMENTED: ICD-10-PCS | Mod: CPTII,S$GLB,, | Performed by: SURGERY

## 2022-07-27 PROCEDURE — 1159F PR MEDICATION LIST DOCUMENTED IN MEDICAL RECORD: ICD-10-PCS | Mod: CPTII,S$GLB,, | Performed by: SURGERY

## 2022-07-27 PROCEDURE — 3078F DIAST BP <80 MM HG: CPT | Mod: CPTII,S$GLB,, | Performed by: SURGERY

## 2022-07-27 PROCEDURE — 1160F RVW MEDS BY RX/DR IN RCRD: CPT | Mod: CPTII,S$GLB,, | Performed by: SURGERY

## 2022-07-27 PROCEDURE — 3074F SYST BP LT 130 MM HG: CPT | Mod: CPTII,S$GLB,, | Performed by: SURGERY

## 2022-07-27 PROCEDURE — 1160F PR REVIEW ALL MEDS BY PRESCRIBER/CLIN PHARMACIST DOCUMENTED: ICD-10-PCS | Mod: CPTII,S$GLB,, | Performed by: SURGERY

## 2022-07-27 PROCEDURE — 99999 PR PBB SHADOW E&M-EST. PATIENT-LVL III: ICD-10-PCS | Mod: PBBFAC,,, | Performed by: SURGERY

## 2022-07-27 PROCEDURE — 3008F BODY MASS INDEX DOCD: CPT | Mod: CPTII,S$GLB,, | Performed by: SURGERY

## 2022-07-27 RX ORDER — FLUTICASONE PROPIONATE 50 MCG
1 SPRAY, SUSPENSION (ML) NASAL DAILY
COMMUNITY
Start: 2022-07-19

## 2022-07-27 NOTE — PROGRESS NOTES
Plastic Surgery Clinic Postop Visit    Subjective:      Alton Bach is a 41 y.o. year old female who presents to the Plastic Surgery Clinic on 07/27/2022 for follow up visit status post capsulotomy and capsulorraphy 7/5/22.   Denies fever, chills, nausea, vomiting, or other systemic signs of infection. Patient very pleased with result.      Vitals:    07/27/22 1357   BP: 120/72   Pulse: 97        Review of patient's allergies indicates:   Allergen Reactions    Ciprofloxacin Hives, Shortness Of Breath and Itching    Cleocin [clindamycin hcl] Hives and Swelling    Coconut Swelling and Rash    Dye Swelling and Blisters     Hair Dye    Strawberry Swelling and Rash    Bacitracin Rash       Current Outpatient Medications on File Prior to Visit   Medication Sig Dispense Refill    BIOTIN ORAL Take by mouth.      fluticasone propionate (FLONASE) 50 mcg/actuation nasal spray 1 spray by Each Nostril route once daily.      ibuprofen (ADVIL,MOTRIN) 600 MG tablet Take 1 tablet (600 mg total) by mouth 3 (three) times daily. 30 tablet 0    ibuprofen (ADVIL,MOTRIN) 800 MG tablet TAKE 1 TABLET BY MOUTH EVERY 8 HOURS AS NEEDED FOR DENTAL PAIN OR INFLAMMATION      VITAMIN B COMPLEX ORAL Take 1 tablet by mouth.       Current Facility-Administered Medications on File Prior to Visit   Medication Dose Route Frequency Provider Last Rate Last Admin    heparin (porcine) injection 5,000 Units  5,000 Units Subcutaneous Once Karen Garcia MD        sodium chloride 0.9% flush 10 mL  10 mL Intravenous PRN Karen Garcia MD           Patient Active Problem List   Diagnosis    Groin strain    Ductal carcinoma in situ (DCIS) of left breast    Breast cancer       Past Surgical History:   Procedure Laterality Date    breast augmentation      BREAST BIOPSY Right     BREAST CAPSULOTOMY Left 7/5/2022    Procedure: CAPSULOTOMY, BREAST;  Surgeon: Haresh Porter MD;  Location: [facility] OR [address] [place] [address];  Service: Plastics;   Laterality: Left;    CHOLECYSTECTOMY      MASTECTOMY Bilateral 10/12/2020    Procedure: MASTECTOMY-Bilateral ;  Surgeon: Anahi Huitron MD;  Location: [address];  Service: General;  Laterality: Bilateral;    REPLACEMENT OF IMPLANT OF BREAST Bilateral 10/12/2020    Procedure: REPLACEMENT, IMPLANT, BREAST-Bilateral;  Surgeon: Haresh Porter MD;  Location: [facility] OR [address] [place] [address];  Service: Plastics;  Laterality: Bilateral;    REPLACEMENT OF IMPLANT OF BREAST Left 11/4/2020    Procedure: Left Breast Implant Exchange;  Surgeon: Haresh Porter MD;  Location: [facility] OR [address] [place] [address];  Service: Plastics;  Laterality: Left;    REVISION OF BREAST IMPLANT Left 7/5/2022    Procedure: REVISION OF PROCEDURE INVOLVING BREAST IMPLANT;  Surgeon: Haresh Porter MD;  Location: [facility] OR [address] [place] [address];  Service: Plastics;  Laterality: Left;  lewis breast    SENTINEL LYMPH NODE BIOPSY Bilateral 10/12/2020    Procedure: BIOPSY, LYMPH NODE, SENTINEL-Bilateral;  Surgeon: Anahi Huitron MD;  Location: [address];  Service: General;  Laterality: Bilateral;       Social History     Socioeconomic History    Marital status: Single   Tobacco Use    Smoking status: Current Every Day Smoker     Packs/day: 0.50    Smokeless tobacco: Never Used   Substance and Sexual Activity    Alcohol use: No    Drug use: No           Review of Systems: Denies fever, chills, nausea, vomiting, chest pain, shortness of breath    Objective:     Physical Exam:  Vitals:    07/27/22 1357   BP: 120/72   Pulse: 97       WD WN NAD  VSS  Normal resp effort  Left breast-incision site c/d/i, non erythematous, tape overlying incision        Assessment:       No diagnosis found.    Plan:   41 y.o. female status post capsulotomy and capsulorraphy 7/5/22.  - Doing well, no issues  - Will schedule for fat transfer in a few months  - Return to clinic in 3 months.      All questions were answered. The patient was advised to call the clinic with any questions  or concerns prior to their next visit.       Jayne Armendariz M.D.  General Surgery PGY-1

## 2022-08-02 ENCOUNTER — OFFICE VISIT (OUTPATIENT)
Dept: FAMILY MEDICINE | Facility: CLINIC | Age: 42
End: 2022-08-02
Payer: COMMERCIAL

## 2022-08-02 VITALS
BODY MASS INDEX: 22.88 KG/M2 | OXYGEN SATURATION: 97 % | HEIGHT: 67 IN | SYSTOLIC BLOOD PRESSURE: 132 MMHG | DIASTOLIC BLOOD PRESSURE: 80 MMHG | TEMPERATURE: 99 F | HEART RATE: 93 BPM | WEIGHT: 145.75 LBS

## 2022-08-02 DIAGNOSIS — R29.898 WEAKNESS OF BOTH HANDS: Primary | ICD-10-CM

## 2022-08-02 PROCEDURE — 1159F PR MEDICATION LIST DOCUMENTED IN MEDICAL RECORD: ICD-10-PCS | Mod: CPTII,S$GLB,, | Performed by: NURSE PRACTITIONER

## 2022-08-02 PROCEDURE — 99999 PR PBB SHADOW E&M-EST. PATIENT-LVL V: ICD-10-PCS | Mod: PBBFAC,,, | Performed by: NURSE PRACTITIONER

## 2022-08-02 PROCEDURE — 3079F DIAST BP 80-89 MM HG: CPT | Mod: CPTII,S$GLB,, | Performed by: NURSE PRACTITIONER

## 2022-08-02 PROCEDURE — 3075F SYST BP GE 130 - 139MM HG: CPT | Mod: CPTII,S$GLB,, | Performed by: NURSE PRACTITIONER

## 2022-08-02 PROCEDURE — 1160F RVW MEDS BY RX/DR IN RCRD: CPT | Mod: CPTII,S$GLB,, | Performed by: NURSE PRACTITIONER

## 2022-08-02 PROCEDURE — 3008F BODY MASS INDEX DOCD: CPT | Mod: CPTII,S$GLB,, | Performed by: NURSE PRACTITIONER

## 2022-08-02 PROCEDURE — 1159F MED LIST DOCD IN RCRD: CPT | Mod: CPTII,S$GLB,, | Performed by: NURSE PRACTITIONER

## 2022-08-02 PROCEDURE — 3079F PR MOST RECENT DIASTOLIC BLOOD PRESSURE 80-89 MM HG: ICD-10-PCS | Mod: CPTII,S$GLB,, | Performed by: NURSE PRACTITIONER

## 2022-08-02 PROCEDURE — 99214 PR OFFICE/OUTPT VISIT, EST, LEVL IV, 30-39 MIN: ICD-10-PCS | Mod: S$GLB,,, | Performed by: NURSE PRACTITIONER

## 2022-08-02 PROCEDURE — 3075F PR MOST RECENT SYSTOLIC BLOOD PRESS GE 130-139MM HG: ICD-10-PCS | Mod: CPTII,S$GLB,, | Performed by: NURSE PRACTITIONER

## 2022-08-02 PROCEDURE — 3008F PR BODY MASS INDEX (BMI) DOCUMENTED: ICD-10-PCS | Mod: CPTII,S$GLB,, | Performed by: NURSE PRACTITIONER

## 2022-08-02 PROCEDURE — 99214 OFFICE O/P EST MOD 30 MIN: CPT | Mod: S$GLB,,, | Performed by: NURSE PRACTITIONER

## 2022-08-02 PROCEDURE — 99999 PR PBB SHADOW E&M-EST. PATIENT-LVL V: CPT | Mod: PBBFAC,,, | Performed by: NURSE PRACTITIONER

## 2022-08-02 PROCEDURE — 1160F PR REVIEW ALL MEDS BY PRESCRIBER/CLIN PHARMACIST DOCUMENTED: ICD-10-PCS | Mod: CPTII,S$GLB,, | Performed by: NURSE PRACTITIONER

## 2022-08-02 NOTE — PROGRESS NOTES
Chief Complaint  Chief Complaint   Patient presents with    hand issues     Right and left       HPI  Alton Bach is a 41 y.o. female with medical diagnoses as listed in the medical history and problem list that presents for Weakness Both Hands. This patient is ne to me.     1. Weakness Both Hands:  Bilateral Mastectomy in 2020. Subsequent surgeries at right breast 12/2021 and 07/05/2022. Reports bilateral hand weakness that started after intial surgery in 2020 that has gotten worse after each surgery. As she tries to complete tasks as simple as sweeping or mopping weakness progresses upward towards her shoulders. Reports dropping objects often. Reports attempting rehabilitation at home for some time but without relief or gaining strength. Can not affectively perform any twisting motions to open bottles or canisters.       PAST MEDICAL HISTORY:  Past Medical History:   Diagnosis Date    Anticoagulant long-term use     DCIS (ductal carcinoma in situ)        PAST SURGICAL HISTORY:  Past Surgical History:   Procedure Laterality Date    breast augmentation      BREAST BIOPSY Right     BREAST CAPSULOTOMY Left 7/5/2022    Procedure: CAPSULOTOMY, BREAST;  Surgeon: Haresh Porter MD;  Location: [address];  Service: Plastics;  Laterality: Left;    CHOLECYSTECTOMY      MASTECTOMY Bilateral 10/12/2020    Procedure: MASTECTOMY-Bilateral ;  Surgeon: Anahi Huitron MD;  Location: [address];  Service: General;  Laterality: Bilateral;    REPLACEMENT OF IMPLANT OF BREAST Bilateral 10/12/2020    Procedure: REPLACEMENT, IMPLANT, BREAST-Bilateral;  Surgeon: Haresh Porter MD;  Location: [address];  Service: Plastics;  Laterality: Bilateral;    REPLACEMENT OF IMPLANT OF BREAST Left 11/4/2020    Procedure: Left Breast Implant Exchange;  Surgeon: Haresh Porter MD;  Location: [address];  Service: Plastics;  Laterality: Left;    REVISION OF BREAST IMPLANT Left  7/5/2022    Procedure: REVISION OF PROCEDURE INVOLVING BREAST IMPLANT;  Surgeon: Haresh Porter MD;  [facility];  Service: Plastics;  Laterality: Left;  lewis breast    SENTINEL LYMPH NODE BIOPSY Bilateral 10/12/2020    Procedure: BIOPSY, LYMPH NODE, SENTINEL-Bilateral;  Surgeon: Anahi Huitron MD;  [facility];  Service: General;  Laterality: Bilateral;       SOCIAL HISTORY:  Social History     Socioeconomic History    Marital status: Single   Tobacco Use    Smoking status: Current Every Day Smoker     Packs/day: 0.50    Smokeless tobacco: Never Used   Substance and Sexual Activity    Alcohol use: No    Drug use: No       FAMILY HISTORY:  Family History   Problem Relation Age of Onset    Hypertension Mother     Heart disease Father        ALLERGIES AND MEDICATIONS: updated and reviewed.  Review of patient's allergies indicates:   Allergen Reactions    Ciprofloxacin Hives, Shortness Of Breath and Itching    Cleocin [clindamycin hcl] Hives and Swelling    Coconut Swelling and Rash    Dye Swelling and Blisters     Hair Dye    Strawberry Swelling and Rash    Bacitracin Rash     Current Outpatient Medications   Medication Sig Dispense Refill    BIOTIN ORAL Take by mouth.      fluticasone propionate (FLONASE) 50 mcg/actuation nasal spray 1 spray by Each Nostril route once daily.      ibuprofen (ADVIL,MOTRIN) 600 MG tablet Take 1 tablet (600 mg total) by mouth 3 (three) times daily. (Patient not taking: Reported on 8/2/2022) 30 tablet 0    ibuprofen (ADVIL,MOTRIN) 800 MG tablet TAKE 1 TABLET BY MOUTH EVERY 8 HOURS AS NEEDED FOR DENTAL PAIN OR INFLAMMATION      VITAMIN B COMPLEX ORAL Take 1 tablet by mouth.       No current facility-administered medications for this visit.     Facility-Administered Medications Ordered in Other Visits   Medication Dose Route Frequency Provider Last Rate Last Admin    heparin (porcine) injection 5,000 Units  5,000 Units Subcutaneous Once  "Karen Garcia MD        sodium chloride 0.9% flush 10 mL  10 mL Intravenous PRN Karen Garcia MD             ROS  Review of Systems   Constitutional: Negative for appetite change, chills, fatigue and fever.   HENT: Negative for congestion, ear pain, postnasal drip, rhinorrhea, sinus pressure, sneezing and sore throat.    Respiratory: Negative for shortness of breath.    Cardiovascular: Negative for chest pain and palpitations.   Gastrointestinal: Negative for abdominal pain, constipation, diarrhea, nausea and vomiting.   Genitourinary: Negative for dysuria.   Musculoskeletal: Negative for arthralgias.   Neurological: Positive for weakness and numbness. Negative for headaches.   Psychiatric/Behavioral: Negative for sleep disturbance.           Physical Exam  Vitals:    08/02/22 1559   BP: 132/80   Pulse: 93   Temp: 98.7 °F (37.1 °C)   TempSrc: Oral   SpO2: 97%   Weight: 66.1 kg (145 lb 11.6 oz)   Height: 5' 7" (1.702 m)    Body mass index is 22.82 kg/m².  Weight: 66.1 kg (145 lb 11.6 oz)   Height: 5' 7" (170.2 cm)   Physical Exam  Constitutional:       General: She is not in acute distress.     Appearance: Normal appearance.   HENT:      Head: Normocephalic and atraumatic.      Right Ear: External ear normal.      Left Ear: External ear normal.      Nose: Nose normal.   Eyes:      Pupils: Pupils are equal, round, and reactive to light.   Cardiovascular:      Rate and Rhythm: Normal rate and regular rhythm.   Pulmonary:      Effort: Pulmonary effort is normal. No respiratory distress.      Breath sounds: Normal breath sounds. No wheezing.   Abdominal:      General: Bowel sounds are normal.      Palpations: Abdomen is soft.   Musculoskeletal:      Right hand: Decreased strength.      Left hand: Decreased strength.      Cervical back: Neck supple.   Lymphadenopathy:      Cervical: No cervical adenopathy.   Skin:     General: Skin is warm and dry.   Neurological:      General: No focal deficit present.      Mental " Status: She is alert and oriented to person, place, and time. Mental status is at baseline.   Psychiatric:         Mood and Affect: Mood normal.             Health Maintenance       Date Due Completion Date    COVID-19 Vaccine (1) Never done ---    Pneumococcal Vaccines (Age 0-64) (1 - PCV) Never done ---    TETANUS VACCINE Never done ---    Influenza Vaccine (1) 09/01/2022 ---    Cervical Cancer Screening 02/09/2025 2/9/2022            Assessment & Plan  Problem List Items Addressed This Visit    None     Visit Diagnoses     Weakness of both hands    -  Primary  - Xray to screen for cervical deviations   - EMG to screen for source of muscle weakness     Relevant Orders    X-Ray Cervical Spine AP And Lateral    EMG W/ ULTRASOUND AND NERVE CONDUCTION TEST 2 Extremities            Health Maintenance reviewed: Deferred per patient     Follow-up: If symptoms worsen or fail to improve

## 2022-08-03 ENCOUNTER — HOSPITAL ENCOUNTER (OUTPATIENT)
Dept: RADIOLOGY | Facility: HOSPITAL | Age: 42
Discharge: HOME | End: 2022-08-03
Attending: NURSE PRACTITIONER
Payer: COMMERCIAL

## 2022-08-03 DIAGNOSIS — R29.898 WEAKNESS OF BOTH HANDS: ICD-10-CM

## 2022-08-03 PROCEDURE — 72040 XR CERVICAL SPINE AP LATERAL: ICD-10-PCS | Mod: 26,,, | Performed by: RADIOLOGY

## 2022-08-03 PROCEDURE — 72040 X-RAY EXAM NECK SPINE 2-3 VW: CPT | Mod: TC,FY,PO

## 2022-08-03 PROCEDURE — 72040 X-RAY EXAM NECK SPINE 2-3 VW: CPT | Mod: 26,,, | Performed by: RADIOLOGY

## 2022-08-24 ENCOUNTER — PATIENT MESSAGE (OUTPATIENT)
Dept: RESEARCH | Facility: HOSPITAL | Age: 42
End: 2022-08-24
Payer: COMMERCIAL

## 2022-09-09 ENCOUNTER — PROCEDURE VISIT (OUTPATIENT)
Dept: NEUROLOGY | Facility: CLINIC | Age: 42
End: 2022-09-09
Payer: COMMERCIAL

## 2022-09-09 VITALS — HEIGHT: 67 IN | BODY MASS INDEX: 22.88 KG/M2 | WEIGHT: 145.75 LBS

## 2022-09-09 DIAGNOSIS — R94.131 DENERVATION CHANGES PRESENT ON ELECTROMYOGRAPHY: Primary | ICD-10-CM

## 2022-09-09 DIAGNOSIS — R29.898 WEAKNESS OF BOTH HANDS: ICD-10-CM

## 2022-09-09 PROCEDURE — 99204 PR OFFICE/OUTPT VISIT, NEW, LEVL IV, 45-59 MIN: ICD-10-PCS | Mod: S$GLB,,, | Performed by: NEUROLOGICAL SURGERY

## 2022-09-09 PROCEDURE — 95886 MUSC TEST DONE W/N TEST COMP: CPT | Mod: S$GLB,,, | Performed by: NEUROLOGICAL SURGERY

## 2022-09-09 PROCEDURE — 95911 NRV CNDJ TEST 9-10 STUDIES: CPT | Mod: S$GLB,,, | Performed by: NEUROLOGICAL SURGERY

## 2022-09-09 PROCEDURE — 99204 OFFICE O/P NEW MOD 45 MIN: CPT | Mod: S$GLB,,, | Performed by: NEUROLOGICAL SURGERY

## 2022-09-09 PROCEDURE — 95911 PR NERVE CONDUCTION STUDY; 9-10 STUDIES: ICD-10-PCS | Mod: S$GLB,,, | Performed by: NEUROLOGICAL SURGERY

## 2022-09-09 PROCEDURE — 95886 PR EMG COMPLETE, W/ NERVE CONDUCTION STUDIES, 5+ MUSCLES: ICD-10-PCS | Mod: S$GLB,,, | Performed by: NEUROLOGICAL SURGERY

## 2022-09-09 NOTE — PROCEDURES
Procedures  Chief Complaint   Patient presents with    Other Misc     EMG        Alton Bach is a 41 y.o. female with a history of multiple medical diagnoses as listed below that presents for evaluation of weakness in the hands.  She has had mastectomy in 2020 with subsequent surgeries including lymph node dissections afterwards.  She said that after having surgery in 2020 she began to notice weakness in her hands.  She feels the symptoms have began stable in between procedures but after the surgery she thinks that there has been some worsening weakness.  She has been dropping things from her grasp.  She says that her fine motor skills have also been negatively impacted.  She presents today for EMG/nerve conduction studies to evaluate her symptoms..     PAST MEDICAL HISTORY:  Past Medical History:   Diagnosis Date    Anticoagulant long-term use     DCIS (ductal carcinoma in situ)        PAST SURGICAL HISTORY:  Past Surgical History:   Procedure Laterality Date    breast augmentation      BREAST BIOPSY Right     BREAST CAPSULOTOMY Left 7/5/2022    Procedure: CAPSULOTOMY, BREAST;  Surgeon: Haresh Porter MD;  Location: [address];  Service: Plastics;  Laterality: Left;    CHOLECYSTECTOMY      MASTECTOMY Bilateral 10/12/2020    Procedure: MASTECTOMY-Bilateral ;  Surgeon: Anahi Huitron MD;  Location: [address];  Service: General;  Laterality: Bilateral;    REPLACEMENT OF IMPLANT OF BREAST Bilateral 10/12/2020    Procedure: REPLACEMENT, IMPLANT, BREAST-Bilateral;  Surgeon: Haresh Porter MD;  Location: [address];  Service: Plastics;  Laterality: Bilateral;    REPLACEMENT OF IMPLANT OF BREAST Left 11/4/2020    Procedure: Left Breast Implant Exchange;  Surgeon: Haresh Porter MD;  Location: [address];  Service: Plastics;  Laterality: Left;    REVISION OF BREAST IMPLANT Left 7/5/2022    Procedure: REVISION OF PROCEDURE INVOLVING BREAST IMPLANT;  Surgeon:  Haresh Porter MD;  Location: [address];  Service: Plastics;  Laterality: Left;  lewis breast    SENTINEL LYMPH NODE BIOPSY Bilateral 10/12/2020    Procedure: BIOPSY, LYMPH NODE, SENTINEL-Bilateral;  Surgeon: Anahi Huitron MD;  Location: [address];  Service: General;  Laterality: Bilateral;       SOCIAL HISTORY:  Social History     Socioeconomic History    Marital status: Single   Tobacco Use    Smoking status: Every Day     Packs/day: 0.50     Types: Cigarettes    Smokeless tobacco: Never   Substance and Sexual Activity    Alcohol use: No    Drug use: No       FAMILY HISTORY:  Family History   Problem Relation Age of Onset    Hypertension Mother     Heart disease Father        ALLERGIES AND MEDICATIONS: updated and reviewed.  Review of patient's allergies indicates:   Allergen Reactions    Ciprofloxacin Hives, Shortness Of Breath and Itching    Cleocin [clindamycin hcl] Hives and Swelling    Coconut Swelling and Rash    Dye Swelling and Blisters     Hair Dye    Strawberry Swelling and Rash    Bacitracin Rash     Current Outpatient Medications   Medication Sig Dispense Refill    BIOTIN ORAL Take by mouth.      fluticasone propionate (FLONASE) 50 mcg/actuation nasal spray 1 spray by Each Nostril route once daily.      ibuprofen (ADVIL,MOTRIN) 600 MG tablet Take 1 tablet (600 mg total) by mouth 3 (three) times daily. (Patient not taking: Reported on 8/2/2022) 30 tablet 0    ibuprofen (ADVIL,MOTRIN) 800 MG tablet TAKE 1 TABLET BY MOUTH EVERY 8 HOURS AS NEEDED FOR DENTAL PAIN OR INFLAMMATION      VITAMIN B COMPLEX ORAL Take 1 tablet by mouth.       No current facility-administered medications for this visit.     Facility-Administered Medications Ordered in Other Visits   Medication Dose Route Frequency Provider Last Rate Last Admin    heparin (porcine) injection 5,000 Units  5,000 Units Subcutaneous Once Karen Garcia MD        sodium chloride 0.9% flush 10 mL  10 mL Intravenous PRN Karen Garcia MD            Review of Systems   Constitutional:  Negative for activity change, appetite change, fever and unexpected weight change.   HENT:  Negative for trouble swallowing and voice change.    Eyes:  Negative for photophobia and visual disturbance.   Respiratory:  Negative for apnea and shortness of breath.    Cardiovascular:  Negative for chest pain and leg swelling.   Gastrointestinal:  Negative for constipation and nausea.   Genitourinary:  Negative for difficulty urinating.   Musculoskeletal:  Negative for back pain, gait problem and neck pain.   Skin:  Negative for color change and pallor.   Neurological:  Positive for weakness and numbness. Negative for dizziness, seizures and syncope.   Hematological:  Negative for adenopathy.   Psychiatric/Behavioral:  Negative for agitation, confusion and decreased concentration.      Neurologic Exam     Mental Status   Oriented to person, place, and time.   Registration: recalls 3 of 3 objects.   Attention: normal. Concentration: normal.   Speech: speech is normal   Level of consciousness: alert  Knowledge: good.     Cranial Nerves     CN II   Right visual field deficit: none  Left visual field deficit: none     CN III, IV, VI   Extraocular motions are normal.   Right pupil: Size: 3 mm. Shape: regular.   Left pupil: Size: 3 mm. Shape: regular.   CN III: no CN III palsy  CN VI: no CN VI palsy  Nystagmus: none   Diplopia: none  Ophthalmoparesis: none  Upgaze: normal  Downgaze: normal  Conjugate gaze: present    CN VII   Facial expression full, symmetric.   Right facial weakness: none  Left facial weakness: none    CN VIII   CN VIII normal.     CN XI   CN XI normal.     CN XII   CN XII normal.   Tongue deviation: none    Motor Exam   Muscle bulk: normal  Overall muscle tone: normal  Right arm tone: normal  Left arm tone: normal  Right leg tone: normal  Left leg tone: normal    Gait, Coordination, and Reflexes     Gait  Gait: normal    Coordination   Finger to nose coordination:  "normal    Tremor   Resting tremor: absent    Physical Exam  Vitals reviewed.   Constitutional:       Appearance: She is well-developed.   HENT:      Head: Normocephalic and atraumatic.   Eyes:      Extraocular Movements: EOM normal.   Pulmonary:      Effort: Pulmonary effort is normal. No respiratory distress.   Musculoskeletal:         General: Normal range of motion.      Cervical back: Normal range of motion.   Neurological:      Mental Status: She is alert and oriented to person, place, and time.      Coordination: Finger-Nose-Finger Test normal.      Gait: Gait is intact.   Psychiatric:         Speech: Speech normal.         Behavior: Behavior normal.         Thought Content: Thought content normal.       Vitals:    09/09/22 0955   Weight: 66.1 kg (145 lb 11.6 oz)   Height: 5' 7" (1.702 m)       Assessment & Plan:    Problem List Items Addressed This Visit       Weakness of both hands    Overview     Symptoms are likely due to underlying cervical radiculopathy.  MRI could be done to correlate EMG findings with any structure abnormalities that could be addressed with physical therapy and/or more aggressive intervention as necessary.            Follow-up: No follow-ups on file.    This note was done with the assistance of voice recognition software. Some errors may be present after proofreading.       "

## 2022-10-13 ENCOUNTER — OUTSIDE PLACE OF SERVICE (OUTPATIENT)
Dept: URGENT CARE | Facility: CLINIC | Age: 42
End: 2022-10-13
Payer: COMMERCIAL

## 2022-10-13 DIAGNOSIS — H92.02 OTALGIA OF LEFT EAR: Primary | ICD-10-CM

## 2022-10-13 PROCEDURE — 99212 PR OFFICE/OUTPT VISIT, EST, LEVL II, 10-19 MIN: ICD-10-PCS | Mod: 95,,, | Performed by: NURSE PRACTITIONER

## 2022-10-13 PROCEDURE — 99212 OFFICE O/P EST SF 10 MIN: CPT | Mod: 95,,, | Performed by: NURSE PRACTITIONER

## 2022-10-26 ENCOUNTER — OFFICE VISIT (OUTPATIENT)
Dept: NEUROLOGY | Facility: CLINIC | Age: 42
End: 2022-10-26
Payer: COMMERCIAL

## 2022-10-26 VITALS
DIASTOLIC BLOOD PRESSURE: 74 MMHG | HEIGHT: 67 IN | WEIGHT: 150.81 LBS | HEART RATE: 88 BPM | SYSTOLIC BLOOD PRESSURE: 123 MMHG | BODY MASS INDEX: 23.67 KG/M2

## 2022-10-26 DIAGNOSIS — R94.131 DENERVATION CHANGES PRESENT ON ELECTROMYOGRAPHY: Primary | ICD-10-CM

## 2022-10-26 DIAGNOSIS — G54.0 BRACHIAL PLEXUS DISORDERS: ICD-10-CM

## 2022-10-26 PROCEDURE — 99215 OFFICE O/P EST HI 40 MIN: CPT | Mod: S$GLB,,, | Performed by: NEUROLOGICAL SURGERY

## 2022-10-26 PROCEDURE — 99999 PR PBB SHADOW E&M-EST. PATIENT-LVL III: CPT | Mod: PBBFAC,,, | Performed by: NEUROLOGICAL SURGERY

## 2022-10-26 PROCEDURE — 3078F DIAST BP <80 MM HG: CPT | Mod: CPTII,S$GLB,, | Performed by: NEUROLOGICAL SURGERY

## 2022-10-26 PROCEDURE — 1159F PR MEDICATION LIST DOCUMENTED IN MEDICAL RECORD: ICD-10-PCS | Mod: CPTII,S$GLB,, | Performed by: NEUROLOGICAL SURGERY

## 2022-10-26 PROCEDURE — 1159F MED LIST DOCD IN RCRD: CPT | Mod: CPTII,S$GLB,, | Performed by: NEUROLOGICAL SURGERY

## 2022-10-26 PROCEDURE — 99215 PR OFFICE/OUTPT VISIT, EST, LEVL V, 40-54 MIN: ICD-10-PCS | Mod: S$GLB,,, | Performed by: NEUROLOGICAL SURGERY

## 2022-10-26 PROCEDURE — 99999 PR PBB SHADOW E&M-EST. PATIENT-LVL III: ICD-10-PCS | Mod: PBBFAC,,, | Performed by: NEUROLOGICAL SURGERY

## 2022-10-26 PROCEDURE — 3074F PR MOST RECENT SYSTOLIC BLOOD PRESSURE < 130 MM HG: ICD-10-PCS | Mod: CPTII,S$GLB,, | Performed by: NEUROLOGICAL SURGERY

## 2022-10-26 PROCEDURE — 3078F PR MOST RECENT DIASTOLIC BLOOD PRESSURE < 80 MM HG: ICD-10-PCS | Mod: CPTII,S$GLB,, | Performed by: NEUROLOGICAL SURGERY

## 2022-10-26 PROCEDURE — 3074F SYST BP LT 130 MM HG: CPT | Mod: CPTII,S$GLB,, | Performed by: NEUROLOGICAL SURGERY

## 2022-10-26 NOTE — PROGRESS NOTES
Chief Complaint   Patient presents with    Extremity Weakness        Alton Bach is a 42 y.o. female with a history of multiple medical diagnoses as listed below that presents for evaluation of weakness in the hands.  She has had mastectomy in 2020 with subsequent surgeries including lymph node dissections afterwards.  She said that after having surgery in 2020 she began to notice weakness in her hands.  She feels the symptoms have began stable in between procedures but after the surgery she thinks that there has been some worsening weakness.  She has been dropping things from her grasp.  She says that her fine motor skills have also been negatively impacted.  She presents today for EMG/nerve conduction studies to evaluate her symptoms..    Interval history  10/26/2022  She continues to have weakness in the hand and arm on the left.  Affected muscle groups have been more distal than proximal.  She has not noticed any significant spread of progression.  She has also not noticed any significant improvement in her symptoms.  She denies any significant pain has been coming from her neck or from the shoulder.  She has not noticed any specific change in her muscle bulk.  She has not had any fasciculations or muscle cramps..     PAST MEDICAL HISTORY:  Past Medical History:   Diagnosis Date    Anticoagulant long-term use     DCIS (ductal carcinoma in situ)        PAST SURGICAL HISTORY:  Past Surgical History:   Procedure Laterality Date    breast augmentation      BREAST BIOPSY Right     BREAST CAPSULOTOMY Left 7/5/2022    Procedure: CAPSULOTOMY, BREAST;  Surgeon: Haresh Porter MD;  Location: [facility] OR [address] [place] [address];  Service: Plastics;  Laterality: Left;    CHOLECYSTECTOMY      MASTECTOMY Bilateral 10/12/2020    Procedure: MASTECTOMY-Bilateral ;  Surgeon: Anahi Huitron MD;  Location: [facility] OR [address] [place] [address];  Service: General;  Laterality: Bilateral;    REPLACEMENT OF IMPLANT OF BREAST Bilateral 10/12/2020     Procedure: REPLACEMENT, IMPLANT, BREAST-Bilateral;  Surgeon: Haresh Porter MD;  Location: [address];  Service: Plastics;  Laterality: Bilateral;    REPLACEMENT OF IMPLANT OF BREAST Left 11/4/2020    Procedure: Left Breast Implant Exchange;  Surgeon: Haresh Porter MD;  Location: [address];  Service: Plastics;  Laterality: Left;    REVISION OF BREAST IMPLANT Left 7/5/2022    Procedure: REVISION OF PROCEDURE INVOLVING BREAST IMPLANT;  Surgeon: Haresh Porter MD;  Location: [facility] OR [address] [place] [address];  Service: Plastics;  Laterality: Left;  lewis breast    SENTINEL LYMPH NODE BIOPSY Bilateral 10/12/2020    Procedure: BIOPSY, LYMPH NODE, SENTINEL-Bilateral;  Surgeon: Anahi Huitron MD;  Location: [address];  Service: General;  Laterality: Bilateral;       SOCIAL HISTORY:  Social History     Socioeconomic History    Marital status: Single   Tobacco Use    Smoking status: Every Day     Packs/day: 0.50     Types: Cigarettes    Smokeless tobacco: Never   Substance and Sexual Activity    Alcohol use: No    Drug use: No       FAMILY HISTORY:  Family History   Problem Relation Age of Onset    Hypertension Mother     Heart disease Father        ALLERGIES AND MEDICATIONS: updated and reviewed.  Review of patient's allergies indicates:   Allergen Reactions    Ciprofloxacin Hives, Shortness Of Breath and Itching    Cleocin [clindamycin hcl] Hives and Swelling    Coconut Swelling and Rash    Dye Swelling and Blisters     Hair Dye    Strawberry Swelling and Rash    Bacitracin Rash     Current Outpatient Medications   Medication Sig Dispense Refill    BIOTIN ORAL Take by mouth.      fluticasone propionate (FLONASE) 50 mcg/actuation nasal spray 1 spray by Each Nostril route once daily.      ibuprofen (ADVIL,MOTRIN) 600 MG tablet Take 1 tablet (600 mg total) by mouth 3 (three) times daily. (Patient not taking: Reported on 8/2/2022) 30 tablet 0    ibuprofen (ADVIL,MOTRIN) 800 MG tablet TAKE 1 TABLET BY  MOUTH EVERY 8 HOURS AS NEEDED FOR DENTAL PAIN OR INFLAMMATION      VITAMIN B COMPLEX ORAL Take 1 tablet by mouth.       No current facility-administered medications for this visit.     Facility-Administered Medications Ordered in Other Visits   Medication Dose Route Frequency Provider Last Rate Last Admin    heparin (porcine) injection 5,000 Units  5,000 Units Subcutaneous Once Karen Garcia MD        sodium chloride 0.9% flush 10 mL  10 mL Intravenous PRN Karen Garcia MD           Review of Systems   Constitutional:  Negative for activity change, appetite change, fever and unexpected weight change.   HENT:  Negative for trouble swallowing and voice change.    Eyes:  Negative for photophobia and visual disturbance.   Respiratory:  Negative for apnea and shortness of breath.    Cardiovascular:  Negative for chest pain and leg swelling.   Gastrointestinal:  Negative for constipation and nausea.   Genitourinary:  Negative for difficulty urinating.   Musculoskeletal:  Negative for back pain, gait problem and neck pain.   Skin:  Negative for color change and pallor.   Neurological:  Positive for weakness and numbness. Negative for dizziness, seizures and syncope.   Hematological:  Negative for adenopathy.   Psychiatric/Behavioral:  Negative for agitation, confusion and decreased concentration.      Neurologic Exam     Mental Status   Oriented to person, place, and time.   Registration: recalls 3 of 3 objects.   Attention: normal. Concentration: normal.   Speech: speech is normal   Level of consciousness: alert  Knowledge: good.     Cranial Nerves     CN II   Right visual field deficit: none  Left visual field deficit: none     CN III, IV, VI   Extraocular motions are normal.   Right pupil: Size: 3 mm. Shape: regular.   Left pupil: Size: 3 mm. Shape: regular.   CN III: no CN III palsy  CN VI: no CN VI palsy  Nystagmus: none   Diplopia: none  Ophthalmoparesis: none  Upgaze: normal  Downgaze: normal  Conjugate gaze:  "present    CN VII   Facial expression full, symmetric.   Right facial weakness: none  Left facial weakness: none    CN VIII   CN VIII normal.     CN XI   CN XI normal.     CN XII   CN XII normal.   Tongue deviation: none    Motor Exam   Muscle bulk: normal  Overall muscle tone: normal  Right arm tone: normal  Left arm tone: normal  Right leg tone: normal  Left leg tone: normal    Gait, Coordination, and Reflexes     Gait  Gait: normal    Coordination   Finger to nose coordination: normal    Tremor   Resting tremor: absent    Physical Exam  Vitals reviewed.   Constitutional:       Appearance: She is well-developed.   HENT:      Head: Normocephalic and atraumatic.   Eyes:      Extraocular Movements: EOM normal.   Pulmonary:      Effort: Pulmonary effort is normal. No respiratory distress.   Musculoskeletal:         General: Normal range of motion.      Cervical back: Normal range of motion.   Neurological:      Mental Status: She is alert and oriented to person, place, and time.      Coordination: Finger-Nose-Finger Test normal.      Gait: Gait is intact.   Psychiatric:         Speech: Speech normal.         Behavior: Behavior normal.         Thought Content: Thought content normal.       Vitals:    10/26/22 1014   BP: 123/74   Pulse: 88   Weight: 68.4 kg (150 lb 12.7 oz)   Height: 5' 7" (1.702 m)       Assessment & Plan:    Problem List Items Addressed This Visit    None  Visit Diagnoses       Denervation changes present on electromyography    -  Primary    Relevant Orders    Creatinine, Serum    Pregnancy, urine rapid    MRI Cervical Spine Demyelinating W W/O Contrast    Brachial plexus disorders        Relevant Orders    MRI Brachial Plexus W WO Contrast            Follow-up: No follow-ups on file.    This note was done with the assistance of voice recognition software. Some errors may be present after proofreading.         "

## 2022-11-04 ENCOUNTER — HOSPITAL ENCOUNTER (OUTPATIENT)
Dept: RADIOLOGY | Facility: HOSPITAL | Age: 42
Discharge: HOME | End: 2022-11-04
Attending: NEUROLOGICAL SURGERY
Payer: COMMERCIAL

## 2022-11-08 ENCOUNTER — OFFICE VISIT (OUTPATIENT)
Dept: URGENT CARE | Facility: CLINIC | Age: 42
End: 2022-11-08
Payer: COMMERCIAL

## 2022-11-08 VITALS
TEMPERATURE: 98 F | SYSTOLIC BLOOD PRESSURE: 137 MMHG | DIASTOLIC BLOOD PRESSURE: 89 MMHG | HEART RATE: 80 BPM | WEIGHT: 150 LBS | RESPIRATION RATE: 19 BRPM | BODY MASS INDEX: 23.49 KG/M2 | OXYGEN SATURATION: 98 %

## 2022-11-08 DIAGNOSIS — H66.92 ACUTE BACTERIAL OTITIS MEDIA, LEFT: Primary | ICD-10-CM

## 2022-11-08 DIAGNOSIS — B96.89 ACUTE BACTERIAL SINUSITIS: ICD-10-CM

## 2022-11-08 DIAGNOSIS — J01.90 ACUTE BACTERIAL SINUSITIS: ICD-10-CM

## 2022-11-08 PROCEDURE — 1159F MED LIST DOCD IN RCRD: CPT | Mod: CPTII,S$GLB,, | Performed by: EMERGENCY MEDICINE

## 2022-11-08 PROCEDURE — 3075F PR MOST RECENT SYSTOLIC BLOOD PRESS GE 130-139MM HG: ICD-10-PCS | Mod: CPTII,S$GLB,, | Performed by: EMERGENCY MEDICINE

## 2022-11-08 PROCEDURE — 3008F BODY MASS INDEX DOCD: CPT | Mod: CPTII,S$GLB,, | Performed by: EMERGENCY MEDICINE

## 2022-11-08 PROCEDURE — 1159F PR MEDICATION LIST DOCUMENTED IN MEDICAL RECORD: ICD-10-PCS | Mod: CPTII,S$GLB,, | Performed by: EMERGENCY MEDICINE

## 2022-11-08 PROCEDURE — 3008F PR BODY MASS INDEX (BMI) DOCUMENTED: ICD-10-PCS | Mod: CPTII,S$GLB,, | Performed by: EMERGENCY MEDICINE

## 2022-11-08 PROCEDURE — 3079F DIAST BP 80-89 MM HG: CPT | Mod: CPTII,S$GLB,, | Performed by: EMERGENCY MEDICINE

## 2022-11-08 PROCEDURE — 3079F PR MOST RECENT DIASTOLIC BLOOD PRESSURE 80-89 MM HG: ICD-10-PCS | Mod: CPTII,S$GLB,, | Performed by: EMERGENCY MEDICINE

## 2022-11-08 PROCEDURE — 99214 OFFICE O/P EST MOD 30 MIN: CPT | Mod: S$GLB,,, | Performed by: EMERGENCY MEDICINE

## 2022-11-08 PROCEDURE — 1160F RVW MEDS BY RX/DR IN RCRD: CPT | Mod: CPTII,S$GLB,, | Performed by: EMERGENCY MEDICINE

## 2022-11-08 PROCEDURE — 99214 PR OFFICE/OUTPT VISIT, EST, LEVL IV, 30-39 MIN: ICD-10-PCS | Mod: S$GLB,,, | Performed by: EMERGENCY MEDICINE

## 2022-11-08 PROCEDURE — 3075F SYST BP GE 130 - 139MM HG: CPT | Mod: CPTII,S$GLB,, | Performed by: EMERGENCY MEDICINE

## 2022-11-08 PROCEDURE — 1160F PR REVIEW ALL MEDS BY PRESCRIBER/CLIN PHARMACIST DOCUMENTED: ICD-10-PCS | Mod: CPTII,S$GLB,, | Performed by: EMERGENCY MEDICINE

## 2022-11-08 RX ORDER — METHYLPREDNISOLONE 4 MG/1
TABLET ORAL
Qty: 21 EACH | Refills: 0 | Status: SHIPPED | OUTPATIENT
Start: 2022-11-08 | End: 2022-11-29

## 2022-11-08 RX ORDER — AMOXICILLIN AND CLAVULANATE POTASSIUM 875; 125 MG/1; MG/1
1 TABLET, FILM COATED ORAL EVERY 12 HOURS
Qty: 20 TABLET | Refills: 0 | Status: SHIPPED | OUTPATIENT
Start: 2022-11-08 | End: 2022-11-18

## 2022-11-08 NOTE — PATIENT INSTRUCTIONS
FOLLOW-UP WITH PRIMARY CARE PHYSICIAN OR ENT IF NOT RESOLVED OR GETTING WORSE DESPITE TREATMENT REGIMEN   CONTINUE ZYRTEC   CONTINUE FLONASE   AUGMENTIN PRESCRIPTION   MEDROL DOSEPAK PRESCRIPTION   SEE OTITIS MEDIA SHEET

## 2022-11-08 NOTE — PROGRESS NOTES
Subjective:       Patient ID: Alton Bach is a 42 y.o. female.    Vitals:  weight is 68 kg (150 lb). Her temperature is 97.6 °F (36.4 °C). Her blood pressure is 137/89 and her pulse is 80. Her respiration is 19 and oxygen saturation is 98%.     Chief Complaint: Otalgia    Pt states she her left ear has been feeling full for over 30 days , pt states she has been taking rx steroids with no relief .    Has had sinus congestion and URI symptoms for the last 1 month and seen and evaluated by telemedicine and encouraged to take Zyrtec, Flonase and prescribed prednisone.  She got very temporary relief however symptoms persisted and worsened over the last few days with significant left-sided ear pain and decreased hearing and sinus pressure.  She has continued to take Flonase and Zyrtec.  Physical exam shows mucopurulent left otitis media as well as sinus congestion and mild tenderness of the maxillary sinuses.  Consistent with bacterial sinusitis and resultant left-sided bacterial otitis media and will cover with antibiotics, Medrol dose pack, continue antihistamine and Flonase nasal spray and follow up with primary care physician.  There is no perforation.    Otalgia   There is pain in the left ear. This is a new problem. The current episode started 1 to 4 weeks ago. The problem occurs constantly. The problem has been unchanged. There has been no fever. The pain is at a severity of 0/10. The patient is experiencing no pain. Pertinent negatives include no abdominal pain, coughing, diarrhea, neck pain, rash, sore throat or vomiting. Treatments tried: steroids. The treatment provided no relief.       ROS    Constitution: Negative for chills, fatigue and fever.   HENT:  Positive for ear pain. Negative for sinus pain and sore throat.    Neck: Negative for neck pain and neck stiffness.   Cardiovascular:  Negative for chest pain, palpitations and sob on exertion.   Eyes:  Negative for eye pain and vision loss.    Respiratory:  Negative for cough, shortness of breath and asthma.    Gastrointestinal:  Negative for abdominal pain, nausea, vomiting and diarrhea.   Genitourinary:  Negative for dysuria, frequency and hematuria.   Musculoskeletal:  Negative for pain, abnormal ROM of joint and back pain.   Skin:  Negative for rash and wound.   Allergic/Immunologic: Negative for seasonal allergies and asthma.   Neurological:  Negative for dizziness, light-headedness and altered mental status.   Psychiatric/Behavioral:  Negative for altered mental status and confusion.      Objective:      Physical Exam   Constitutional: She is oriented to person, place, and time. She appears well-developed. She is cooperative.  Non-toxic appearance. She does not appear ill. No distress.   HENT:   Head: Normocephalic and atraumatic.   Ears:   Right Ear: Hearing, tympanic membrane, external ear and ear canal normal.   Left Ear: Hearing, external ear and ear canal normal.      Comments: LEFT EXTERNAL CANAL IS NORMAL, TM IS SLIGHTLY BULGING WITH CLOUDY FLUID AND ERYTHEMA CONSISTENT WITH BACTERIAL OTITIS MEDIA WITH EFFUSION.  MILD TENDERNESS TO PALPATION OF THE MAXILLARY SINUSES AND TO A LESSER DEGREE FRONTAL SINUSES.  THROAT AND LUNGS CLEAR.  Nose: Nose normal. No mucosal edema, rhinorrhea or nasal deformity. No epistaxis. Right sinus exhibits no maxillary sinus tenderness and no frontal sinus tenderness. Left sinus exhibits no maxillary sinus tenderness and no frontal sinus tenderness.   Mouth/Throat: Uvula is midline, oropharynx is clear and moist and mucous membranes are normal. No trismus in the jaw. Normal dentition. No uvula swelling. No oropharyngeal exudate, posterior oropharyngeal edema or posterior oropharyngeal erythema.   Eyes: Conjunctivae and lids are normal. No scleral icterus.   Neck: Trachea normal and phonation normal. Neck supple. No edema present. No erythema present. No neck rigidity present.   Cardiovascular: Normal rate, regular  rhythm, normal heart sounds and normal pulses.   Pulmonary/Chest: Effort normal and breath sounds normal. No respiratory distress. She has no decreased breath sounds. She has no rhonchi.   Abdominal: Normal appearance.   Musculoskeletal: Normal range of motion.         General: No deformity. Normal range of motion.   Neurological: She is alert and oriented to person, place, and time. She exhibits normal muscle tone. Coordination normal.   Skin: Skin is warm, dry, intact, not diaphoretic and not pale.   Psychiatric: Her speech is normal and behavior is normal. Judgment and thought content normal.   Nursing note and vitals reviewed.      Assessment:       1. Acute bacterial otitis media, left    2. Acute bacterial sinusitis          Plan:         Acute bacterial otitis media, left    Acute bacterial sinusitis    Other orders  -     amoxicillin-clavulanate 875-125mg (AUGMENTIN) 875-125 mg per tablet; Take 1 tablet by mouth every 12 (twelve) hours. for 10 days  Dispense: 20 tablet; Refill: 0  -     methylPREDNISolone (MEDROL DOSEPACK) 4 mg tablet; use as directed  Dispense: 21 each; Refill: 0       Patient Instructions   FOLLOW-UP WITH PRIMARY CARE PHYSICIAN OR ENT IF NOT RESOLVED OR GETTING WORSE DESPITE TREATMENT REGIMEN   CONTINUE ZYRTEC   CONTINUE FLONASE   AUGMENTIN PRESCRIPTION   MEDROL DOSEPAK PRESCRIPTION   SEE OTITIS MEDIA SHEET

## 2023-02-01 ENCOUNTER — OFFICE VISIT (OUTPATIENT)
Dept: URGENT CARE | Facility: CLINIC | Age: 43
End: 2023-02-01
Payer: COMMERCIAL

## 2023-02-01 VITALS
DIASTOLIC BLOOD PRESSURE: 84 MMHG | RESPIRATION RATE: 17 BRPM | HEART RATE: 90 BPM | BODY MASS INDEX: 22.29 KG/M2 | OXYGEN SATURATION: 99 % | SYSTOLIC BLOOD PRESSURE: 132 MMHG | HEIGHT: 67 IN | TEMPERATURE: 98 F | WEIGHT: 142 LBS

## 2023-02-01 DIAGNOSIS — Z48.89 ENCOUNTER FOR POST SURGICAL WOUND CHECK: Primary | ICD-10-CM

## 2023-02-01 PROCEDURE — 1159F MED LIST DOCD IN RCRD: CPT | Mod: CPTII,S$GLB,,

## 2023-02-01 PROCEDURE — 3079F PR MOST RECENT DIASTOLIC BLOOD PRESSURE 80-89 MM HG: ICD-10-PCS | Mod: CPTII,S$GLB,,

## 2023-02-01 PROCEDURE — 3008F BODY MASS INDEX DOCD: CPT | Mod: CPTII,S$GLB,,

## 2023-02-01 PROCEDURE — 1159F PR MEDICATION LIST DOCUMENTED IN MEDICAL RECORD: ICD-10-PCS | Mod: CPTII,S$GLB,,

## 2023-02-01 PROCEDURE — 3075F PR MOST RECENT SYSTOLIC BLOOD PRESS GE 130-139MM HG: ICD-10-PCS | Mod: CPTII,S$GLB,,

## 2023-02-01 PROCEDURE — 1160F RVW MEDS BY RX/DR IN RCRD: CPT | Mod: CPTII,S$GLB,,

## 2023-02-01 PROCEDURE — 3079F DIAST BP 80-89 MM HG: CPT | Mod: CPTII,S$GLB,,

## 2023-02-01 PROCEDURE — 1160F PR REVIEW ALL MEDS BY PRESCRIBER/CLIN PHARMACIST DOCUMENTED: ICD-10-PCS | Mod: CPTII,S$GLB,,

## 2023-02-01 PROCEDURE — 99213 OFFICE O/P EST LOW 20 MIN: CPT | Mod: S$GLB,,,

## 2023-02-01 PROCEDURE — 99213 PR OFFICE/OUTPT VISIT, EST, LEVL III, 20-29 MIN: ICD-10-PCS | Mod: S$GLB,,,

## 2023-02-01 PROCEDURE — 3075F SYST BP GE 130 - 139MM HG: CPT | Mod: CPTII,S$GLB,,

## 2023-02-01 PROCEDURE — 3008F PR BODY MASS INDEX (BMI) DOCUMENTED: ICD-10-PCS | Mod: CPTII,S$GLB,,

## 2023-02-01 RX ORDER — MUPIROCIN 20 MG/G
OINTMENT TOPICAL 3 TIMES DAILY
Qty: 30 G | Refills: 3 | Status: SHIPPED | OUTPATIENT
Start: 2023-02-01 | End: 2024-02-29

## 2023-02-01 RX ORDER — MUPIROCIN 20 MG/G
OINTMENT TOPICAL 2 TIMES DAILY
COMMUNITY
Start: 2023-01-03 | End: 2023-02-01 | Stop reason: SDUPTHER

## 2023-02-01 NOTE — PROGRESS NOTES
"Subjective:       Patient ID: Alton Bach is a 42 y.o. female.    Vitals:  height is 5' 7" (1.702 m) and weight is 64.4 kg (142 lb). Her temperature is 98.1 °F (36.7 °C). Her blood pressure is 132/84 and her pulse is 90. Her respiration is 17 and oxygen saturation is 99%.     Chief Complaint: Wound Check    Patient is a 42-year-old female who presents for postoperative wound check.  Patient states that she had reconstructive abdominal surgery done in November 22, 2022 with Dr. Shine.  She last followed up with him about 1 month ago.  States that at that time, she had 2 abdominal wounds at the surgical site.  Her surgeon had given her mupirocin for the wounds which she stated was helping and the wounds were closing.  However, she ran out of mupirocin and has been unable to get in touch with her surgeon.  Requesting refill on mupirocin.  She has been use bacitracin instead which she states she is allergic to and has been irritating her skin.  She denies any fever, chills, myalgias, pain or tenderness at the wound site, nausea, vomiting, abdominal pain, diarrhea, purulent drainage, erythema, red streaking.    Wound Check  She was originally treated more than 14 days ago. Her temperature was unmeasured prior to arrival. There has been colored discharge from the wound. The redness has improved. The swelling has improved. There is no pain present.     Constitution: Negative for chills and fever.   Cardiovascular:  Negative for chest pain.   Respiratory:  Negative for cough and shortness of breath.    Gastrointestinal:  Negative for abdominal pain, nausea, vomiting, constipation and diarrhea.   Musculoskeletal:  Negative for pain.   Skin:  Positive for wound. Negative for rash and erythema.   Allergic/Immunologic: Negative for itching.   Neurological:  Negative for dizziness and headaches.     Objective:      Physical Exam   Constitutional: She is oriented to person, place, and time. She appears " well-developed.   HENT:   Head: Normocephalic and atraumatic. Head is without abrasion, without contusion and without laceration.   Ears:   Right Ear: External ear normal.   Left Ear: External ear normal.   Nose: Nose normal.   Mouth/Throat: Oropharynx is clear and moist and mucous membranes are normal.   Eyes: Conjunctivae, EOM and lids are normal. Pupils are equal, round, and reactive to light.   Neck: Trachea normal and phonation normal. Neck supple.   Cardiovascular: Normal rate, regular rhythm and normal heart sounds.   Pulmonary/Chest: Effort normal and breath sounds normal. No stridor. No respiratory distress.   Musculoskeletal: Normal range of motion.         General: Normal range of motion.   Neurological: She is alert and oriented to person, place, and time.   Skin: Skin is warm, dry, intact and no rash. Capillary refill takes less than 2 seconds. No abrasion, No burn, No bruising, No erythema and No ecchymosis        Psychiatric: Her speech is normal and behavior is normal. Judgment and thought content normal.   Nursing note and vitals reviewed.      Assessment:       1. Encounter for post surgical wound check          Plan:         Encounter for post surgical wound check  -     mupirocin (BACTROBAN) 2 % ointment; Apply topically 3 (three) times daily.  Dispense: 30 g; Refill: 3         Advised patient to follow up with her surgeon.          Patient Instructions   - Follow up with your PCP or specialty clinic as directed in the next 1-2 weeks if not improved or as needed.  You can call [phone] to schedule an appointment with the appropriate provider.    - Go to the ER or seek medical attention immediately if you develop new or worsening symptoms.    - You must understand that you have received an Urgent Care treatment only and that you may be released before all of your medical problems are known or treated.   - You, the patient, will arrange for follow up care as instructed.   - If your condition  worsens or fails to improve we recommend that you receive another evaluation at the ER immediately or contact your PCP to discuss your concerns or return here.

## 2023-02-02 NOTE — PATIENT INSTRUCTIONS
- Follow up with your PCP or specialty clinic as directed in the next 1-2 weeks if not improved or as needed.  You can call [phone] to schedule an appointment with the appropriate provider.    - Go to the ER or seek medical attention immediately if you develop new or worsening symptoms.    - You must understand that you have received an Urgent Care treatment only and that you may be released before all of your medical problems are known or treated.   - You, the patient, will arrange for follow up care as instructed.   - If your condition worsens or fails to improve we recommend that you receive another evaluation at the ER immediately or contact your PCP to discuss your concerns or return here.

## 2023-08-04 ENCOUNTER — HOSPITAL ENCOUNTER (EMERGENCY)
Facility: HOSPITAL | Age: 43
Discharge: HOME | End: 2023-08-04
Attending: EMERGENCY MEDICINE
Payer: COMMERCIAL

## 2023-08-04 VITALS
SYSTOLIC BLOOD PRESSURE: 125 MMHG | OXYGEN SATURATION: 100 % | BODY MASS INDEX: 22.44 KG/M2 | DIASTOLIC BLOOD PRESSURE: 84 MMHG | TEMPERATURE: 98 F | RESPIRATION RATE: 18 BRPM | HEART RATE: 87 BPM | HEIGHT: 67 IN | WEIGHT: 143 LBS

## 2023-08-04 DIAGNOSIS — R10.32 LLQ ABDOMINAL PAIN: ICD-10-CM

## 2023-08-04 LAB
ALBUMIN SERPL-MCNC: 3.9 G/DL (ref 3.3–5.5)
ALP SERPL-CCNC: 74 U/L (ref 42–141)
B-HCG UR QL: NEGATIVE
BILIRUB SERPL-MCNC: 0.3 MG/DL (ref 0.2–1.6)
BILIRUBIN, POC UA: NEGATIVE
BLOOD, POC UA: ABNORMAL
BUN SERPL-MCNC: 7 MG/DL (ref 7–22)
CALCIUM SERPL-MCNC: 10 MG/DL (ref 8–10.3)
CHLORIDE SERPL-SCNC: 103 MMOL/L (ref 98–108)
CLARITY, POC UA: CLEAR
COLOR, POC UA: YELLOW
CREAT SERPL-MCNC: 0.7 MG/DL (ref 0.6–1.2)
CTP QC/QA: YES
GLUCOSE SERPL-MCNC: 106 MG/DL (ref 73–118)
GLUCOSE, POC UA: NEGATIVE
KETONES, POC UA: NEGATIVE
LEUKOCYTE EST, POC UA: NEGATIVE
NITRITE, POC UA: NEGATIVE
PH UR STRIP: 5.5 [PH]
POC ALT (SGPT): 24 U/L (ref 10–47)
POC AST (SGOT): 31 U/L (ref 11–38)
POC TCO2: 27 MMOL/L (ref 18–33)
POTASSIUM BLD-SCNC: 3.8 MMOL/L (ref 3.6–5.1)
PROTEIN, POC UA: NEGATIVE
PROTEIN, POC: 7.5 G/DL (ref 6.4–8.1)
SODIUM BLD-SCNC: 139 MMOL/L (ref 128–145)
SPECIFIC GRAVITY, POC UA: >=1.03
UROBILINOGEN, POC UA: 0.2 E.U./DL

## 2023-08-04 PROCEDURE — 99283 EMERGENCY DEPT VISIT LOW MDM: CPT | Mod: 25,ER

## 2023-08-04 PROCEDURE — 81025 URINE PREGNANCY TEST: CPT | Mod: ER

## 2023-08-04 PROCEDURE — 81025 URINE PREGNANCY TEST: CPT | Mod: ER | Performed by: EMERGENCY MEDICINE

## 2023-08-04 PROCEDURE — 80053 COMPREHEN METABOLIC PANEL: CPT | Mod: ER

## 2023-08-05 NOTE — ED PROVIDER NOTES
Encounter Date: 8/4/2023    SCRIBE #1 NOTE: IDl, opal scribing for, and in the presence of,  Tobi Rodriguez DNP. I have scribed the following portions of the note - Other sections scribed: HPI, ROS, PE.       History     Chief Complaint   Patient presents with    Abdominal Pain     Pt reports recent abdominal reconstructive surgery approx 2 months ago and states she feels pressure and sensitivity in the left quadrants (no incision noted at location).      Alton Bach is a 42 y.o. female S/P abdominal reconstructive surgery 03/24/2023 presenting to the Emergency room for further evaluation of LLQ pain and bloating with associated tenderness. Other associated symptoms are constipation and vaginal spotting. The patient reports following up with surgeon 2 days ago with no significant findings. Treatment of Dulcolax  with relief of constipation. Denies problems eating/drinking or other symptoms. No further complaints at present time.        The history is provided by the patient. No  was used.     Review of patient's allergies indicates:   Allergen Reactions    Ciprofloxacin Hives, Shortness Of Breath and Itching    Cleocin [clindamycin hcl] Hives and Swelling    Coconut Swelling and Rash    Dye Swelling and Blisters     Hair Dye    Strawberry Swelling and Rash    Bacitracin Rash     Past Medical History:   Diagnosis Date    Anticoagulant long-term use     DCIS (ductal carcinoma in situ)      Past Surgical History:   Procedure Laterality Date    breast augmentation      BREAST BIOPSY Right     BREAST CAPSULOTOMY Left 7/5/2022    Procedure: CAPSULOTOMY, BREAST;  Surgeon: Haresh Porter MD;  Location: [facility] OR [address] [place] [address];  Service: Plastics;  Laterality: Left;    CHOLECYSTECTOMY      MASTECTOMY Bilateral 10/12/2020    Procedure: MASTECTOMY-Bilateral ;  Surgeon: Anahi Huitron MD;  Location: [facility] OR [address] [place] [address];  Service: General;  Laterality: Bilateral;    REPLACEMENT OF  IMPLANT OF BREAST Bilateral 10/12/2020    Procedure: REPLACEMENT, IMPLANT, BREAST-Bilateral;  Surgeon: Haresh Porter MD;  Location: [facility] OR [address] [place] [address];  Service: Plastics;  Laterality: Bilateral;    REPLACEMENT OF IMPLANT OF BREAST Left 2020    Procedure: Left Breast Implant Exchange;  Surgeon: Haresh Porter MD;  Location: [facility] OR [address] [place] [address];  Service: Plastics;  Laterality: Left;    REVISION OF BREAST IMPLANT Left 2022    Procedure: REVISION OF PROCEDURE INVOLVING BREAST IMPLANT;  Surgeon: Haresh Porter MD;  Location: [facility] OR [address] [place] [address];  Service: Plastics;  Laterality: Left;  lewis breast    SENTINEL LYMPH NODE BIOPSY Bilateral 10/12/2020    Procedure: BIOPSY, LYMPH NODE, SENTINEL-Bilateral;  Surgeon: Anahi Huitron MD;  Location: [address];  Service: General;  Laterality: Bilateral;     Family History   Problem Relation Age of Onset    Hypertension Mother     Heart disease Father      Social History     Tobacco Use    Smoking status: Former     Current packs/day: 0.00     Types: Cigarettes     Quit date: 2022     Years since quittin.9    Smokeless tobacco: Never   Substance Use Topics    Alcohol use: No    Drug use: No     Review of Systems   Constitutional:  Negative for chills, fatigue and fever.   HENT:  Negative for congestion, ear discharge, ear pain, postnasal drip, rhinorrhea, sinus pressure, sneezing, sore throat and voice change.    Eyes:  Negative for discharge and itching.   Respiratory:  Negative for cough, shortness of breath and wheezing.    Cardiovascular:  Negative for chest pain, palpitations and leg swelling.   Gastrointestinal:  Positive for abdominal distention and abdominal pain. Negative for constipation (Presently resolved), diarrhea, nausea and vomiting.   Endocrine: Negative for polydipsia, polyphagia and polyuria.   Genitourinary:  Positive for vaginal bleeding. Negative for dysuria, frequency, hematuria, urgency, vaginal discharge and vaginal  pain.   Musculoskeletal:  Negative for arthralgias and myalgias.   Skin:  Negative for rash and wound.   Neurological:  Negative for dizziness, seizures, syncope, weakness and numbness.   Hematological:  Negative for adenopathy. Does not bruise/bleed easily.   Psychiatric/Behavioral:  Negative for self-injury and suicidal ideas. The patient is not nervous/anxious.        Physical Exam     Initial Vitals [08/04/23 1909]   BP Pulse Resp Temp SpO2   125/84 87 18 98.2 °F (36.8 °C) 100 %      MAP       --         Physical Exam    Nursing note and vitals reviewed.  Constitutional: She appears well-developed and well-nourished.   HENT:   Head: Normocephalic and atraumatic.   Right Ear: External ear normal.   Left Ear: External ear normal.   Nose: Nose normal.   Eyes: Conjunctivae and EOM are normal. Pupils are equal, round, and reactive to light. Right eye exhibits no discharge. Left eye exhibits no discharge.   Neck:   Normal range of motion.  Abdominal: Abdomen is soft. Bowel sounds are normal. There is no abdominal tenderness.   Very mild distension.    No right CVA tenderness.  No left CVA tenderness.   Musculoskeletal:         General: Normal range of motion.      Cervical back: Normal range of motion.     Neurological: She is alert and oriented to person, place, and time.   Skin: Skin is dry. Capillary refill takes less than 2 seconds.         ED Course   Procedures  Labs Reviewed   POCT URINALYSIS W/O SCOPE - Abnormal; Notable for the following components:       Result Value    Spec Grav UA >=1.030 (*)     Blood, UA 1+ (*)     All other components within normal limits   POCT URINE PREGNANCY   POCT CBC   POCT URINALYSIS W/O SCOPE   POCT CMP   POCT CMP          Imaging Results              X-Ray Abdomen Flat And Erect (Final result)  Result time 08/04/23 19:59:09      Final result by Flor Dahl MD (08/04/23 19:59:09)                   Impression:      Nonspecific bowel gas pattern.      Electronically signed  by: Flor Dahl  Date:    08/04/2023  Time:    19:59               Narrative:    EXAMINATION:  ABDOMEN FLAT AND ERECT    CLINICAL HISTORY:  Left lower quadrant pain    TECHNIQUE:  Abdomen flat and erect radiographs were submitted.    COMPARISON:  None.    FINDINGS:  Abdomen flat and erect radiographs demonstrate a nonspecific bowel gas pattern.  There are no dilated loops of small bowel or air-fluid levels detected.  There is no free air detected under the diaphragm. Cholecystectomy clips are present.  Metallic clips are seen projecting over the pelvis change.  There is mild S-type scoliosis.                                       Medications - No data to display  Medical Decision Making:   History:   Old Medical Records: I decided to obtain old medical records.  Initial Assessment:   42 y.o. female presents to the ER for abdominal pain. Patient had abdominal reconstructive surgery 2 months ago. During follow up appointment 2 days ago, surgeon discovered no significant findings. On exam, patient had very mild distension. US imaging ordered. UPT, CMC, CMP ordered.  Differential Diagnosis:   Includes but is not limited to: Sepsis, Infection    Clinical Tests:   Lab Tests: Ordered and Reviewed  Radiological Study: Ordered and Reviewed     Medical Decision Making  On re-examination the patient's abdomen remained soft and nontender.  Secondary to her declination of IV attempts we were unable to start an IV for IV contrast.  As she is quite thin I feel that a noncontrast study would not be effective or useful.  X-ray of the abdomen flat and erect did not demonstrate a heavy stool burden or free air in the peritoneum.    Problems Addressed:  LLQ abdominal pain: acute illness or injury     Details: I feel this may be the side effect of a small adhesion although she is not obstructed at this time.  The abdomen is soft and nontender.    Amount and/or Complexity of Data Reviewed  Labs: ordered. Decision-making details  "documented in ED Course.  Radiology: ordered. Decision-making details documented in ED Course.  Discussion of management or test interpretation with external provider(s): Vital signs at the time of disposition were:  /84 (BP Location: Right arm, Patient Position: Sitting)   Pulse 87   Temp 98.2 °F (36.8 °C) (Oral)   Resp 18   Ht 5' 7" (1.702 m)   Wt 64.9 kg (143 lb)   LMP  (Within Weeks)   SpO2 100%   BMI 22.40 kg/m²       See AVS for additional recommendations. Medications listed herein were prescribed after reviewing the patient's allergies, medication list, history, most recent laboratories as available.  Referrals below were provided after reviewing the patient's previous medical providers. She understands she  should return for any worsening or changes in condition.  Prior to discharge the patient was asked if she  had any additional concerns or complaints and she declined. The patient was given an opportunity to ask questions and all were answered to her satisfaction.     Risk  Diagnosis or treatment significantly limited by social determinants of health.         Scribe attestation: Scribe attestation: I, Tobi Rodriguez, DNP ACNP-BC FNP-C ENP-C,  personally performed the services described in this documentation. All medical record entries made by the scribe were at my direction and in my presence.  I have reviewed the chart and agree that the record reflects my personal performance and is accurate and complete.       Scribe Attestation:   Scribe #1: I performed the above scribed service and the documentation accurately describes the services I performed. I attest to the accuracy of the note.        ED Course as of 08/04/23 2246   Fri Aug 04, 2023   1934 Preg Test, Ur: Negative [VC]   1934 BP: 125/84 [VC]   1934 Temp: 98.2 °F (36.8 °C) [VC]   1934 Temp Source: Oral [VC]   1934 Pulse: 87 [VC]   1934 Resp: 18 [VC]   1934 SpO2: 100 % [VC]   1934 POCT URINALYSIS W/O SCOPE(!)  Neg for uti.  Known " "to have vaginal spotting at this time. [VC]   1941 First IV stick was unsuccessful, pt declined further sticks, understands ct scan cannot be completed usefully without IV contrast.  As abd is soft and nontender, I feel no emergent or surgical etiology exists.  I will xray for free air or constipation.  Pt will likely be d/c'ed home.  Also declined pain meds. [VC]   1949 POCT CBC  Esentially normal cbc.   [VC]   1949 POCT CMP  Normal cmp. [VC]      ED Course User Index  [VC] Tobi Rodriguez DNP                 Clinical Impression:   Final diagnoses:  [R10.32] LLQ abdominal pain     Vital signs at the time of disposition were:  /84 (BP Location: Right arm, Patient Position: Sitting)   Pulse 87   Temp 98.2 °F (36.8 °C) (Oral)   Resp 18   Ht 5' 7" (1.702 m)   Wt 64.9 kg (143 lb)   LMP  (Within Weeks)   SpO2 100%   BMI 22.40 kg/m²       See AVS for additional recommendations. Medications listed herein were prescribed after reviewing the patient's allergies, medication list, history, most recent laboratories as available.  Referrals below were provided after reviewing the patient's previous medical providers. She understands she  should return for any worsening or changes in condition.  Prior to discharge the patient was asked if she  had any additional concerns or complaints and she declined. The patient was given an opportunity to ask questions and all were answered to her satisfaction.     ED Disposition Condition    Discharge Stable          ED Prescriptions    None       Follow-up Information       Follow up With Specialties Details Why Contact Info    Haresh Porter MD Plastic Surgery Schedule an appointment as soon as possible for a visit   [address] [place] [id number]  [phone]               Tobi Rodriguez DNP  08/04/23 2246    "

## 2023-08-05 NOTE — ED TRIAGE NOTES
PT C/O LUQ PAIN X3 DAYS. STATES SHE HAS HAD MULTIPLE ABD SX AND SAW HER SURGEON'S NP YESTERDAY WHO TOLD HER THEY DID NOT FEEL ANYTHING TO PALPATION. PT STATES IT IS A PRESSURE LIKE BUILD UP NOT ASSOCIATED W/ DIFFUSE PAIN, N/V/D, OR OTHER GI ISSUES. STATES THE AREA IS SOMETIMES TENDER. STATES RECENT SPOTTING BUT SHE RECENTLY CAME OFF OF HER PERIOD.

## 2023-09-11 ENCOUNTER — OFFICE VISIT (OUTPATIENT)
Dept: HEMATOLOGY/ONCOLOGY | Facility: CLINIC | Age: 43
End: 2023-09-11
Payer: COMMERCIAL

## 2023-09-11 VITALS
DIASTOLIC BLOOD PRESSURE: 60 MMHG | SYSTOLIC BLOOD PRESSURE: 128 MMHG | TEMPERATURE: 98 F | HEART RATE: 91 BPM | WEIGHT: 142.88 LBS | BODY MASS INDEX: 22.43 KG/M2 | RESPIRATION RATE: 17 BRPM | OXYGEN SATURATION: 100 % | HEIGHT: 67 IN

## 2023-09-11 DIAGNOSIS — N63.25 BREAST LUMP ON LEFT SIDE AT 3 O'CLOCK POSITION: ICD-10-CM

## 2023-09-11 DIAGNOSIS — Z86.000 HISTORY OF DUCTAL CARCINOMA IN SITU (DCIS) OF BREAST: Primary | ICD-10-CM

## 2023-09-11 DIAGNOSIS — N63.0 MASS OF BREAST, UNSPECIFIED LATERALITY: ICD-10-CM

## 2023-09-11 DIAGNOSIS — Z90.13 HISTORY OF BILATERAL MASTECTOMY: ICD-10-CM

## 2023-09-11 DIAGNOSIS — N63.13 BREAST LUMP ON RIGHT SIDE AT 8 O'CLOCK POSITION: ICD-10-CM

## 2023-09-11 PROCEDURE — 1160F PR REVIEW ALL MEDS BY PRESCRIBER/CLIN PHARMACIST DOCUMENTED: ICD-10-PCS | Mod: CPTII,S$GLB,, | Performed by: NURSE PRACTITIONER

## 2023-09-11 PROCEDURE — 3008F PR BODY MASS INDEX (BMI) DOCUMENTED: ICD-10-PCS | Mod: CPTII,S$GLB,, | Performed by: NURSE PRACTITIONER

## 2023-09-11 PROCEDURE — 99214 OFFICE O/P EST MOD 30 MIN: CPT | Mod: S$GLB,,, | Performed by: NURSE PRACTITIONER

## 2023-09-11 PROCEDURE — 1160F RVW MEDS BY RX/DR IN RCRD: CPT | Mod: CPTII,S$GLB,, | Performed by: NURSE PRACTITIONER

## 2023-09-11 PROCEDURE — 99214 PR OFFICE/OUTPT VISIT, EST, LEVL IV, 30-39 MIN: ICD-10-PCS | Mod: S$GLB,,, | Performed by: NURSE PRACTITIONER

## 2023-09-11 PROCEDURE — 1159F MED LIST DOCD IN RCRD: CPT | Mod: CPTII,S$GLB,, | Performed by: NURSE PRACTITIONER

## 2023-09-11 PROCEDURE — 3074F SYST BP LT 130 MM HG: CPT | Mod: CPTII,S$GLB,, | Performed by: NURSE PRACTITIONER

## 2023-09-11 PROCEDURE — 3078F PR MOST RECENT DIASTOLIC BLOOD PRESSURE < 80 MM HG: ICD-10-PCS | Mod: CPTII,S$GLB,, | Performed by: NURSE PRACTITIONER

## 2023-09-11 PROCEDURE — 3074F PR MOST RECENT SYSTOLIC BLOOD PRESSURE < 130 MM HG: ICD-10-PCS | Mod: CPTII,S$GLB,, | Performed by: NURSE PRACTITIONER

## 2023-09-11 PROCEDURE — 99999 PR PBB SHADOW E&M-EST. PATIENT-LVL IV: CPT | Mod: PBBFAC,,, | Performed by: NURSE PRACTITIONER

## 2023-09-11 PROCEDURE — 3078F DIAST BP <80 MM HG: CPT | Mod: CPTII,S$GLB,, | Performed by: NURSE PRACTITIONER

## 2023-09-11 PROCEDURE — 99999 PR PBB SHADOW E&M-EST. PATIENT-LVL IV: ICD-10-PCS | Mod: PBBFAC,,, | Performed by: NURSE PRACTITIONER

## 2023-09-11 PROCEDURE — 1159F PR MEDICATION LIST DOCUMENTED IN MEDICAL RECORD: ICD-10-PCS | Mod: CPTII,S$GLB,, | Performed by: NURSE PRACTITIONER

## 2023-09-11 PROCEDURE — 3008F BODY MASS INDEX DOCD: CPT | Mod: CPTII,S$GLB,, | Performed by: NURSE PRACTITIONER

## 2023-09-11 NOTE — PROGRESS NOTES
"[facility]  Department of Surgery    REFERRING PROVIDER: Self, Aaareferral  No address on file    Chief Complaint: Shannon De La Torre    DIAGNOSIS:   This is a 42 y.o. 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    TREATMENT:   1. Bilateral  mastectomy with sentinel node biopsy on 10/12/2020. Anahi vu M.D. Surgical Oncology. Implant reconstruction with Dr. Porter.     HISTORY OF PRESENT ILLNESS:   Alton Bach is a 42 y.o. female comes in for an urgent care visit for new chest wall masses bilaterally with hx of bilateral mastectomy.  She was originally scheduled in a TC slot, but is here today only to get imaging for these new lumps she feels.  Her plastic surgeon, Dr. Shine, recommended she come in to get imaging done.  She is not longer following with Dr. Porter.  She had a revision of her falp with fat transfer in May 2023. Shortly after she noticed one lump on each side of her reconstructed breast.  No pain.  No other complaints today.  Otherwise feeling well.     IMAGING:   Will get bilateral limited ultrasound    PHYSICAL EXAM:   /60   Pulse 91   Temp 98 °F (36.7 °C)   Resp 17   Ht 5' 7" (1.702 m)   Wt 64.8 kg (142 lb 13.7 oz)   LMP 09/04/2023   SpO2 100%   BMI 22.37 kg/m²     Physical Exam   Vitals reviewed.  Constitutional: She is oriented to person, place, and time. She appears well-developed. No distress.   HENT:   Head: Normocephalic and atraumatic.   Eyes: Pupils are equal, round, and reactive to light.   Pulmonary/Chest: Effort normal and breath sounds normal. No respiratory distress. She exhibits no mass, no tenderness and no edema. Right breast exhibits no mass, no skin change and no tenderness. Left breast exhibits no mass, no skin change and no tenderness.       Abdominal: Soft.   Neurological: She is alert and oriented to person, place, and time.   Skin: Skin is warm and dry. She is not diaphoretic. No erythema. "     Psychiatric: Her behavior is normal.        ASSESSMENT:   This is a 42 y.o. female without evidence of recurrence by exam, history or imaging.       PLAN:   Alton was seen today for shannon de la torre.    Diagnoses and all orders for this visit:    History of ductal carcinoma in situ (DCIS) of breast  History of bilateral mastectomy  Dong generally well  No new pains  Continue monthly self breast/chest wall checks  Follow up with surg/onc and plastics    -     US Breast Bilateral Limited; Future    Mass of breast, unspecified laterality  Breast lump on left side at 3 o'clock position  Breast lump on right side at 8 o'clock position  Bilateral limited breast ultrasound ordered  -     US Breast Bilateral Limited; Future      Med Onc Chart Routing  Urgent    Follow up with physician    Follow up with SUSSY . As needed   Infusion scheduling note    Injection scheduling note    Labs    Imaging Other   bilateral breast ultrasound (please call patient) scheduled for 9/13, but she did not think this time would work, needs to reschedule   Pharmacy appointment    Other referrals            Urgent Care Oncology Visit    Date and Time: 09/11/2023 8:14 AM   Patient MRN: [id number]   Chief Complaint:   Chief Complaint   Patient presents with    Shannon De La Torre     Reason for Urgent Care Visit: breast pain/lump  Patient Type: new/reestablish care  Intervention: imaging ordered  Dispo:  rtc as needed and yearly with breast surgery team  UrgOnc appointment addressed via:  my chart, self schedule  This UrgOnc visit was in person  Time spent handling UC issue:  30 minutes    Was this virtual or in person UrgOnc visit appropriate? Appropriate, but scheduled in TC slot (self-scheduled)    Total time of this visit, including time spent face to face with patient and/or via video/audio, and also in preparing for today's visit for MDM and documentation. (Medical Decision Making, including consideration of possible diagnoses, management options,  complex medical record review, review of diagnostic tests and information, consideration and discussion of significant complications based on comorbidities, and discussion with providers involved with the care of the patient) is 30 minutes. Greater than 50% was spent face to face with the patient counseling and coordinating care.    Julissa Davenport, JESÚS

## 2023-09-20 ENCOUNTER — HOSPITAL ENCOUNTER (OUTPATIENT)
Dept: RADIOLOGY | Facility: HOSPITAL | Age: 43
Discharge: HOME | End: 2023-09-20
Attending: NURSE PRACTITIONER
Payer: COMMERCIAL

## 2023-09-20 DIAGNOSIS — Z86.000 HISTORY OF DUCTAL CARCINOMA IN SITU (DCIS) OF BREAST: ICD-10-CM

## 2023-09-20 DIAGNOSIS — N63.13 BREAST LUMP ON RIGHT SIDE AT 8 O'CLOCK POSITION: ICD-10-CM

## 2023-09-20 DIAGNOSIS — Z90.13 HISTORY OF BILATERAL MASTECTOMY: ICD-10-CM

## 2023-09-20 DIAGNOSIS — N63.25 BREAST LUMP ON LEFT SIDE AT 3 O'CLOCK POSITION: ICD-10-CM

## 2023-09-20 DIAGNOSIS — N63.0 MASS OF BREAST, UNSPECIFIED LATERALITY: ICD-10-CM

## 2023-09-20 PROCEDURE — 76642 US BREAST BILATERAL LIMITED: ICD-10-PCS | Mod: 26,50,, | Performed by: RADIOLOGY

## 2023-09-20 PROCEDURE — 76642 ULTRASOUND BREAST LIMITED: CPT | Mod: TC,50

## 2023-09-20 PROCEDURE — 76642 ULTRASOUND BREAST LIMITED: CPT | Mod: 26,50,, | Performed by: RADIOLOGY

## 2023-09-27 ENCOUNTER — OFFICE VISIT (OUTPATIENT)
Dept: CARDIOLOGY | Facility: CLINIC | Age: 43
End: 2023-09-27
Payer: COMMERCIAL

## 2023-09-27 VITALS
WEIGHT: 138 LBS | HEART RATE: 106 BPM | SYSTOLIC BLOOD PRESSURE: 129 MMHG | HEIGHT: 67 IN | DIASTOLIC BLOOD PRESSURE: 85 MMHG | BODY MASS INDEX: 21.66 KG/M2

## 2023-09-27 DIAGNOSIS — I83.93 VARICOSE VEINS OF BOTH LOWER EXTREMITIES, UNSPECIFIED WHETHER COMPLICATED: ICD-10-CM

## 2023-09-27 DIAGNOSIS — E78.5 HYPERLIPIDEMIA, UNSPECIFIED HYPERLIPIDEMIA TYPE: ICD-10-CM

## 2023-09-27 DIAGNOSIS — Z82.49 FAMILY HISTORY OF CARDIAC DISORDER: Primary | ICD-10-CM

## 2023-09-27 DIAGNOSIS — Z13.6 ENCOUNTER FOR SCREENING FOR CARDIOVASCULAR DISORDERS: ICD-10-CM

## 2023-09-27 PROCEDURE — 3074F PR MOST RECENT SYSTOLIC BLOOD PRESSURE < 130 MM HG: ICD-10-PCS | Mod: CPTII,S$GLB,, | Performed by: INTERNAL MEDICINE

## 2023-09-27 PROCEDURE — 99999 PR PBB SHADOW E&M-EST. PATIENT-LVL III: CPT | Mod: PBBFAC,,, | Performed by: INTERNAL MEDICINE

## 2023-09-27 PROCEDURE — 93005 ELECTROCARDIOGRAM TRACING: CPT

## 2023-09-27 PROCEDURE — 99999 PR PBB SHADOW E&M-EST. PATIENT-LVL III: ICD-10-PCS | Mod: PBBFAC,,, | Performed by: INTERNAL MEDICINE

## 2023-09-27 PROCEDURE — 3074F SYST BP LT 130 MM HG: CPT | Mod: CPTII,S$GLB,, | Performed by: INTERNAL MEDICINE

## 2023-09-27 PROCEDURE — 3079F DIAST BP 80-89 MM HG: CPT | Mod: CPTII,S$GLB,, | Performed by: INTERNAL MEDICINE

## 2023-09-27 PROCEDURE — 99204 PR OFFICE/OUTPT VISIT, NEW, LEVL IV, 45-59 MIN: ICD-10-PCS | Mod: 25,S$GLB,, | Performed by: INTERNAL MEDICINE

## 2023-09-27 PROCEDURE — 3008F BODY MASS INDEX DOCD: CPT | Mod: CPTII,S$GLB,, | Performed by: INTERNAL MEDICINE

## 2023-09-27 PROCEDURE — 1160F RVW MEDS BY RX/DR IN RCRD: CPT | Mod: CPTII,S$GLB,, | Performed by: INTERNAL MEDICINE

## 2023-09-27 PROCEDURE — 3079F PR MOST RECENT DIASTOLIC BLOOD PRESSURE 80-89 MM HG: ICD-10-PCS | Mod: CPTII,S$GLB,, | Performed by: INTERNAL MEDICINE

## 2023-09-27 PROCEDURE — 1159F PR MEDICATION LIST DOCUMENTED IN MEDICAL RECORD: ICD-10-PCS | Mod: CPTII,S$GLB,, | Performed by: INTERNAL MEDICINE

## 2023-09-27 PROCEDURE — 1160F PR REVIEW ALL MEDS BY PRESCRIBER/CLIN PHARMACIST DOCUMENTED: ICD-10-PCS | Mod: CPTII,S$GLB,, | Performed by: INTERNAL MEDICINE

## 2023-09-27 PROCEDURE — 99204 OFFICE O/P NEW MOD 45 MIN: CPT | Mod: 25,S$GLB,, | Performed by: INTERNAL MEDICINE

## 2023-09-27 PROCEDURE — 93010 EKG 12-LEAD: ICD-10-PCS | Mod: S$GLB,,, | Performed by: INTERNAL MEDICINE

## 2023-09-27 PROCEDURE — 1159F MED LIST DOCD IN RCRD: CPT | Mod: CPTII,S$GLB,, | Performed by: INTERNAL MEDICINE

## 2023-09-27 PROCEDURE — 93010 ELECTROCARDIOGRAM REPORT: CPT | Mod: S$GLB,,, | Performed by: INTERNAL MEDICINE

## 2023-09-27 PROCEDURE — 3008F PR BODY MASS INDEX (BMI) DOCUMENTED: ICD-10-PCS | Mod: CPTII,S$GLB,, | Performed by: INTERNAL MEDICINE

## 2023-09-27 RX ORDER — VITAMIN E 268 MG
400 CAPSULE ORAL DAILY
COMMUNITY

## 2023-09-27 NOTE — PROGRESS NOTES
HISTORY:    41 yo F w a h/o breast cancer s/p bilateral mastectomy with implants' 20 followed by SHAYNE due to complications with implants '22, varicose veins, prev tob presenting for initial evaluation me.     The patient denies any symptoms of chest pain, shortness of breath, or dyspnea on exertion.     Patient comes in to discuss lower extremities venous prominence that she has noted over the last year. No edema or claudication. Does notice that the hair on the back of her calves has stopped growing.     Very active in her life. Walks for exercise and does stair climbing/leg lifts without issue. Works in the [employer] business and is on her feet or 16 hours/day.     The patient denies any previous history of myocardial infarction, coronary artery disease, peripheral arterial disease, stroke, congestive heart failure, or cardiomyopathy. + FH of premature CAD- father MI in his 30s.      PHYSICAL EXAM:    Vitals:    09/27/23 1158   BP: 129/85   Pulse: 106       NAD, A+Ox3.  No jvd, no bruit.  RRR nml s1,s2. No murmurs.  CTA B no wheezes or crackles.  No edema. + small varicosities. Palpable DP/PT bilaterally.    LABS/STUDIES (imaging reviewed during clinic visit):    2022 CBC and CMP normal.  2021  and HDL 47.  Triglycerides 94.  TSH normal.    ECG today demonstrates sinus rhythm with no Q-waves or ST changes.        ASSESSMENT & PLAN:    1. Family history of cardiac disorder    2. Varicose veins of both lower extremities, unspecified whether complicated    3. Encounter for screening for cardiovascular disorders    4. Hyperlipidemia, unspecified hyperlipidemia type        Orders Placed This Encounter    CT Calcium Scoring Cardiac    Ankle Brachial Indices (NICOLÁS)    CV US Lower Extremity Veins Bilateral Insufficiency    IN OFFICE EKG 12-LEAD (to Muse)        Pt with varicose veins. Small on exam. Check NICOLÁS and venous insufficiency study. Compression stockings.     +FH premature CAD and prev tob use. Bps  slightly above goal. Start home bp log.    . Check a coronary ca score given FH.    Eats healthy and exercises. Quit tob.     Follow up in about 3 months (around 12/27/2023).      Jerson Milligan MD

## 2023-09-28 ENCOUNTER — HOSPITAL ENCOUNTER (OUTPATIENT)
Dept: RADIOLOGY | Facility: HOSPITAL | Age: 43
Discharge: HOME | End: 2023-09-28
Attending: INTERNAL MEDICINE
Payer: COMMERCIAL

## 2023-09-28 DIAGNOSIS — E78.5 HYPERLIPIDEMIA, UNSPECIFIED HYPERLIPIDEMIA TYPE: ICD-10-CM

## 2023-09-28 DIAGNOSIS — Z13.6 ENCOUNTER FOR SCREENING FOR CARDIOVASCULAR DISORDERS: ICD-10-CM

## 2023-09-28 DIAGNOSIS — Z82.49 FAMILY HISTORY OF CARDIAC DISORDER: ICD-10-CM

## 2023-09-28 PROCEDURE — 75571 CT HRT W/O DYE W/CA TEST: CPT | Mod: 26,,, | Performed by: RADIOLOGY

## 2023-09-28 PROCEDURE — 75571 CT HRT W/O DYE W/CA TEST: CPT | Mod: TC

## 2023-09-28 PROCEDURE — 75571 CT CALCIUM SCORING CARDIAC: ICD-10-PCS | Mod: 26,,, | Performed by: RADIOLOGY

## 2023-09-29 ENCOUNTER — HOSPITAL ENCOUNTER (OUTPATIENT)
Dept: CARDIOLOGY | Facility: HOSPITAL | Age: 43
Discharge: HOME | End: 2023-09-29
Attending: INTERNAL MEDICINE
Payer: COMMERCIAL

## 2023-09-29 DIAGNOSIS — I83.93 VARICOSE VEINS OF BOTH LOWER EXTREMITIES, UNSPECIFIED WHETHER COMPLICATED: ICD-10-CM

## 2023-09-29 LAB
IMMEDIATE ARM BP: 114 MMHG
IMMEDIATE LEFT ABI: 1.29
IMMEDIATE LEFT TIBIAL: 147 MMHG
IMMEDIATE RIGHT ABI: 1.39
IMMEDIATE RIGHT TIBIAL: 159 MMHG
LEFT ABI: 1.16
LEFT ARM BP: 116 MMHG
LEFT DORSALIS PEDIS: 121 MMHG
LEFT POSTERIOR TIBIAL: 135 MMHG
RIGHT ABI: 1.14
RIGHT DORSALIS PEDIS: 118 MMHG
RIGHT POSTERIOR TIBIAL: 132 MMHG
TREADMILL GRADE: 12 %
TREADMILL SPEED: 2 MPH
TREADMILL TIME: 5 MIN

## 2023-09-29 PROCEDURE — 93924 ANKLE BRACHIAL INDICES (ABI): ICD-10-PCS | Mod: 26,,, | Performed by: INTERNAL MEDICINE

## 2023-09-29 PROCEDURE — 93924 LWR XTR VASC STDY BILAT: CPT

## 2023-09-29 PROCEDURE — 93924 LWR XTR VASC STDY BILAT: CPT | Mod: 26,,, | Performed by: INTERNAL MEDICINE

## 2023-10-02 ENCOUNTER — PATIENT MESSAGE (OUTPATIENT)
Dept: HEMATOLOGY/ONCOLOGY | Facility: CLINIC | Age: 43
End: 2023-10-02
Payer: COMMERCIAL

## 2023-10-03 ENCOUNTER — PATIENT MESSAGE (OUTPATIENT)
Dept: HEMATOLOGY/ONCOLOGY | Facility: CLINIC | Age: 43
End: 2023-10-03
Payer: COMMERCIAL

## 2023-10-09 ENCOUNTER — OFFICE VISIT (OUTPATIENT)
Dept: SURGERY | Facility: CLINIC | Age: 43
End: 2023-10-09
Payer: COMMERCIAL

## 2023-10-09 VITALS — SYSTOLIC BLOOD PRESSURE: 130 MMHG | DIASTOLIC BLOOD PRESSURE: 68 MMHG | HEART RATE: 101 BPM | OXYGEN SATURATION: 100 %

## 2023-10-09 DIAGNOSIS — Z85.3 PERSONAL HISTORY OF BREAST CANCER: Primary | ICD-10-CM

## 2023-10-09 DIAGNOSIS — N64.4 BREAST PAIN IN FEMALE: ICD-10-CM

## 2023-10-09 DIAGNOSIS — Z90.13 S/P MASTECTOMY, BILATERAL: ICD-10-CM

## 2023-10-09 DIAGNOSIS — N63.0 PALPABLE MASS OF BREAST: ICD-10-CM

## 2023-10-09 DIAGNOSIS — Z12.39 SCREENING BREAST EXAMINATION: ICD-10-CM

## 2023-10-09 PROCEDURE — 3078F DIAST BP <80 MM HG: CPT | Mod: CPTII,S$GLB,, | Performed by: PHYSICIAN ASSISTANT

## 2023-10-09 PROCEDURE — 3075F SYST BP GE 130 - 139MM HG: CPT | Mod: CPTII,S$GLB,, | Performed by: PHYSICIAN ASSISTANT

## 2023-10-09 PROCEDURE — 1159F MED LIST DOCD IN RCRD: CPT | Mod: CPTII,S$GLB,, | Performed by: PHYSICIAN ASSISTANT

## 2023-10-09 PROCEDURE — 1159F PR MEDICATION LIST DOCUMENTED IN MEDICAL RECORD: ICD-10-PCS | Mod: CPTII,S$GLB,, | Performed by: PHYSICIAN ASSISTANT

## 2023-10-09 PROCEDURE — 3075F PR MOST RECENT SYSTOLIC BLOOD PRESS GE 130-139MM HG: ICD-10-PCS | Mod: CPTII,S$GLB,, | Performed by: PHYSICIAN ASSISTANT

## 2023-10-09 PROCEDURE — 3078F PR MOST RECENT DIASTOLIC BLOOD PRESSURE < 80 MM HG: ICD-10-PCS | Mod: CPTII,S$GLB,, | Performed by: PHYSICIAN ASSISTANT

## 2023-10-09 PROCEDURE — 1160F RVW MEDS BY RX/DR IN RCRD: CPT | Mod: CPTII,S$GLB,, | Performed by: PHYSICIAN ASSISTANT

## 2023-10-09 PROCEDURE — 1160F PR REVIEW ALL MEDS BY PRESCRIBER/CLIN PHARMACIST DOCUMENTED: ICD-10-PCS | Mod: CPTII,S$GLB,, | Performed by: PHYSICIAN ASSISTANT

## 2023-10-09 PROCEDURE — 99999 PR PBB SHADOW E&M-EST. PATIENT-LVL III: ICD-10-PCS | Mod: PBBFAC,,, | Performed by: PHYSICIAN ASSISTANT

## 2023-10-09 PROCEDURE — 99999 PR PBB SHADOW E&M-EST. PATIENT-LVL III: CPT | Mod: PBBFAC,,, | Performed by: PHYSICIAN ASSISTANT

## 2023-10-09 PROCEDURE — 99214 PR OFFICE/OUTPT VISIT, EST, LEVL IV, 30-39 MIN: ICD-10-PCS | Mod: S$GLB,,, | Performed by: PHYSICIAN ASSISTANT

## 2023-10-09 PROCEDURE — 99214 OFFICE O/P EST MOD 30 MIN: CPT | Mod: S$GLB,,, | Performed by: PHYSICIAN ASSISTANT

## 2023-10-09 NOTE — PROGRESS NOTES
[facility]  Department of Surgery    REFERRING PROVIDER: No referring provider defined for this encounter.    Chief Complaint: Right Breast Change    DIAGNOSIS:   This is a 42 y.o. 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    TREATMENT:   1. Bilateral  mastectomy with sentinel node biopsy on 10/12/2020. Anahi vu M.D. Surgical Oncology.   2. Initial implant reconstruction with Dr. Porter, but due to complications, she eventually underwent explant and a bilateral MARY flap was performed with Dr. Shine in May of 2023.    HISTORY OF PRESENT ILLNESS:   Alton Bach is a 42 y.o. female comes in for follow up evaluation of worsening breast pain as well as new palpable masses appreciated on self exam. US of both breasts performed noting post operative changes as well as simple cysts in both breasts. Notes bilateral tenderness/tightness of her chest wall since her surgery in May. There have been no changes to family history. The patient denies constitutional symptoms of night sweats, weight loss, new headaches, visual changes, new back or bony pain, chest pain, or shortness of breath.      IMAGING:   None needed    PHYSICAL EXAM:   /68   Pulse 101   LMP 09/04/2023   SpO2 100%   Physical Exam   Vitals reviewed.  Constitutional: She is oriented to person, place, and time. She appears well-developed. No distress.   HENT:   Head: Normocephalic and atraumatic.   Eyes: Pupils are equal, round, and reactive to light.   Pulmonary/Chest: Effort normal and breath sounds normal. No respiratory distress. She exhibits no mass, no tenderness and no edema. Right breast exhibits no mass, no skin change and no tenderness. Left breast exhibits no mass, no skin change and no tenderness.       Abdominal: Soft.   Neurological: She is alert and oriented to person, place, and time.   Skin: Skin is warm and dry. She is not diaphoretic. No erythema.     Psychiatric: Her  behavior is normal.        ASSESSMENT:   This is a 42 y.o. female with new bilateral breasts findings as well as pain after recent MARY flap reconstruction in May 2023.      PLAN:   1. Long discussion regarding her breast pain as well as the new palpable masses of her bilateral flap reconstruction. Explained that her pain is likely due to scar tissue formation. Will refer to PT to manage after complete imaging work up of her likely scar tissue/oil cysts seen in both breasts on recent imaging.   2. Patient was discussed with Radiology who prefers mammogram to further evaluate, but agree it is all likely benign findings.   3. Patient is still interested in nipple tattoos now that her final implant exchange is complete. Will plan to set her up once work up is complete.   4. The patient is in agreement with the plan. Questions were encouraged and answered to patient's satisfaction. Alton will call our office with any questions or concerns.     Susana Segundo PA-C  Breast Surgery

## 2023-10-09 NOTE — Clinical Note
Hi! Can you set this lady up for bilateral diag? She had an US but spoke with Dorothy who would prefer mammo given her flap reconstruction.  Thank you!

## 2023-10-11 ENCOUNTER — HOSPITAL ENCOUNTER (OUTPATIENT)
Dept: RADIOLOGY | Facility: HOSPITAL | Age: 43
Discharge: HOME | End: 2023-10-11
Attending: PHYSICIAN ASSISTANT
Payer: COMMERCIAL

## 2023-10-11 DIAGNOSIS — N63.0 PALPABLE MASS OF BREAST: ICD-10-CM

## 2023-10-11 DIAGNOSIS — Z90.13 S/P MASTECTOMY, BILATERAL: ICD-10-CM

## 2023-10-11 DIAGNOSIS — Z85.3 PERSONAL HISTORY OF BREAST CANCER: ICD-10-CM

## 2023-10-11 PROCEDURE — 77066 DX MAMMO INCL CAD BI: CPT | Mod: 26,,, | Performed by: RADIOLOGY

## 2023-10-11 PROCEDURE — 77066 MAMMO DIGITAL DIAGNOSTIC BILAT WITH TOMO: ICD-10-PCS | Mod: 26,,, | Performed by: RADIOLOGY

## 2023-10-11 PROCEDURE — 77062 MAMMO DIGITAL DIAGNOSTIC BILAT WITH TOMO: ICD-10-PCS | Mod: 26,,, | Performed by: RADIOLOGY

## 2023-10-11 PROCEDURE — 77062 BREAST TOMOSYNTHESIS BI: CPT | Mod: 26,,, | Performed by: RADIOLOGY

## 2023-10-11 PROCEDURE — 77066 DX MAMMO INCL CAD BI: CPT | Mod: TC

## 2023-10-19 ENCOUNTER — HOSPITAL ENCOUNTER (OUTPATIENT)
Dept: CARDIOLOGY | Facility: HOSPITAL | Age: 43
Discharge: HOME | End: 2023-10-19
Attending: INTERNAL MEDICINE
Payer: COMMERCIAL

## 2023-10-19 DIAGNOSIS — I83.93 VARICOSE VEINS OF BOTH LOWER EXTREMITIES, UNSPECIFIED WHETHER COMPLICATED: ICD-10-CM

## 2023-10-19 LAB
LEFT GREAT SAPHENOUS DISTAL THIGH DIA: 0.21 CM
LEFT GREAT SAPHENOUS JUNCTION DIA: 0.41 CM
LEFT GREAT SAPHENOUS KNEE DIA: 0.13 CM
LEFT GREAT SAPHENOUS MIDDLE THIGH DIA: 0.25 CM
LEFT GREAT SAPHENOUS PROXIMAL CALF DIA: 0.12 CM
RIGHT GREAT SAPHENOUS DISTAL THIGH DIA: 0.24 CM
RIGHT GREAT SAPHENOUS JUNCTION DIA: 0.5 CM
RIGHT GREAT SAPHENOUS KNEE DIA: 0.22 CM
RIGHT GREAT SAPHENOUS MIDDLE THIGH DIA: 0.25 CM
RIGHT GREAT SAPHENOUS PROXIMAL CALF DIA: 0.14 CM
RIGHT SMALL SAPHENOUS SPJ DIA: 0.24 CM

## 2023-10-19 PROCEDURE — 93970 EXTREMITY STUDY: CPT | Mod: 26,,, | Performed by: INTERNAL MEDICINE

## 2023-10-19 PROCEDURE — 93970 CV US LOWER VENOUS INSUFFICIENCY BILATERAL (CUPID ONLY): ICD-10-PCS | Mod: 26,,, | Performed by: INTERNAL MEDICINE

## 2023-10-19 PROCEDURE — 93970 EXTREMITY STUDY: CPT | Mod: TC

## 2023-12-08 ENCOUNTER — OFFICE VISIT (OUTPATIENT)
Dept: SURGERY | Facility: CLINIC | Age: 43
End: 2023-12-08
Payer: COMMERCIAL

## 2023-12-08 DIAGNOSIS — Z42.8 ENCOUNTER FOR OTHER PLASTIC AND RECONSTRUCTIVE SURGERY FOLLOWING MEDICAL PROCEDURE OR HEALED INJURY: ICD-10-CM

## 2023-12-08 DIAGNOSIS — L81.8 TATTOOS: ICD-10-CM

## 2023-12-08 DIAGNOSIS — Z90.13 S/P MASTECTOMY, BILATERAL: ICD-10-CM

## 2023-12-08 DIAGNOSIS — Z85.3 PERSONAL HISTORY OF BREAST CANCER: Primary | ICD-10-CM

## 2023-12-08 PROCEDURE — 11922 CORRECT SKIN COLOR EA 20.0CM: CPT | Mod: S$GLB,,, | Performed by: PHYSICIAN ASSISTANT

## 2023-12-08 PROCEDURE — 11922 PR CORRECT SKIN COLR DEFCT ADDN 20 SQCM: ICD-10-PCS | Mod: S$GLB,,, | Performed by: PHYSICIAN ASSISTANT

## 2023-12-08 PROCEDURE — 99999 PR PBB SHADOW E&M-EST. PATIENT-LVL II: CPT | Mod: PBBFAC,,, | Performed by: PHYSICIAN ASSISTANT

## 2023-12-08 PROCEDURE — 11921 PR CORRECT SKIN COLR DEFCT 6.1-20 SQCM: ICD-10-PCS | Mod: S$GLB,,, | Performed by: PHYSICIAN ASSISTANT

## 2023-12-08 PROCEDURE — 99499 UNLISTED E&M SERVICE: CPT | Mod: S$GLB,,, | Performed by: PHYSICIAN ASSISTANT

## 2023-12-08 PROCEDURE — 99499 NO LOS: ICD-10-PCS | Mod: S$GLB,,, | Performed by: PHYSICIAN ASSISTANT

## 2023-12-08 PROCEDURE — 99999 PR PBB SHADOW E&M-EST. PATIENT-LVL II: ICD-10-PCS | Mod: PBBFAC,,, | Performed by: PHYSICIAN ASSISTANT

## 2023-12-08 PROCEDURE — 11921 CORRECT SKN COLOR 6.1-20.0CM: CPT | Mod: S$GLB,,, | Performed by: PHYSICIAN ASSISTANT

## 2023-12-08 NOTE — PROGRESS NOTES
[facility]  Department of Surgery        REFERRING PROVIDER: No referring provider defined for this encounter.    Chief Complaint: No chief complaint on file.    Alton Bach presents to Breast Surgery Clinic on 12/8/2023 for bilateral 3 dimensional nipple tattoo for breast reconstruction. She is doing well today with no issue since her last visit. All incisions well healed. She denies pain, fever, chills, Nausea, vomiting, or other systemic signs of infection. She is pleased with the outcome of her breast reconstruction and would like to proceed with tattoos.     Review of patient's allergies indicates:   Allergen Reactions    Ciprofloxacin Hives, Shortness Of Breath and Itching    Cleocin [clindamycin hcl] Hives and Swelling    Coconut Swelling and Rash    Dye Swelling and Blisters     Hair Dye    Strawberry Swelling and Rash    Bacitracin Rash     Current Outpatient Medications on File Prior to Visit   Medication Sig Dispense Refill    ascorbic acid, vitamin C, (VITAMIN C) 100 MG tablet Take 100 mg by mouth.      BIOTIN ORAL Take by mouth.      fluticasone propionate (FLONASE) 50 mcg/actuation nasal spray 1 spray by Each Nostril route once daily.      mupirocin (BACTROBAN) 2 % ointment Apply topically 3 (three) times daily. 30 g 3    VITAMIN B COMPLEX ORAL Take 1 tablet by mouth.      vitamin E 400 UNIT capsule Take 400 Units by mouth once daily.       Current Facility-Administered Medications on File Prior to Visit   Medication Dose Route Frequency Provider Last Rate Last Admin    heparin (porcine) injection 5,000 Units  5,000 Units Subcutaneous Once Karen Garcia MD        sodium chloride 0.9% flush 10 mL  10 mL Intravenous PRN Karen Garcia MD         Patient Active Problem List   Diagnosis    Groin strain    Ductal carcinoma in situ (DCIS) of left breast    Breast cancer    Weakness of both hands     [unfilled]  Social History     Socioeconomic History    Marital status: Single   Tobacco Use     Smoking status: Former     Current packs/day: 0.00     Types: Cigarettes     Quit date: 2022     Years since quittin.2    Smokeless tobacco: Never   Substance and Sexual Activity    Alcohol use: No    Drug use: No         PROCEDURE NOTE     Procedure for 3 dimensional nipple tattooing was discussed in detail with the patient as were the risks and possible complications. She understands that the risks include but are not limited to bleeding, scarring, infection, pain, asymmetry, allergic reaction, failure to take and need for second stage tattoo. She understands that greater than 50% of patients require a second stage tattoo procedure for better saturation and 3 dimensional shading. After all questions were answered, the patient signed the consent form and that will be scanned into her medical record.     3.2 cm circular guide  was used for nipple areolar complex placement on both breast (total 6.4 cm). The patient visualized placement in exam room mirror and agreed to proceed with placement. Colors for nipple and areola were mixed and tested on skin of breast, patient agreed to proceed with colors tested. The breasts were then prepped with chlora prep. The nipple areola complexes were then tattooed using 3 dimensional technique. There was positive punctate bleeding noted. Breast were cleaned and a tegaderm was applied to tattoo.   Patient tolerated the procedure well. There were no complications.     Post op instructions and care were discussed in detail with the patient. Tegaderm to remain in place for 3 days then removed and washed with soap and water then apply AquaPhor or Eucerin to tattoos as needed for moisturizer twice daily. Patient voiced understanding. A written copy of post op care was also provided. All questions were answered. She will return to clinic in 4 weeks.     The patient was advised to call the clinic with any questions or concerns prior to their next visit.       Susana Segundo  PA-C  Breast Surgery

## 2023-12-28 ENCOUNTER — OFFICE VISIT (OUTPATIENT)
Dept: CARDIOLOGY | Facility: CLINIC | Age: 43
End: 2023-12-28
Payer: COMMERCIAL

## 2023-12-28 VITALS
WEIGHT: 140.88 LBS | RESPIRATION RATE: 18 BRPM | BODY MASS INDEX: 22.06 KG/M2 | DIASTOLIC BLOOD PRESSURE: 84 MMHG | HEART RATE: 84 BPM | SYSTOLIC BLOOD PRESSURE: 119 MMHG

## 2023-12-28 DIAGNOSIS — I83.90 VARICOSE VEINS OF CALF: Primary | ICD-10-CM

## 2023-12-28 PROCEDURE — 1159F PR MEDICATION LIST DOCUMENTED IN MEDICAL RECORD: ICD-10-PCS | Mod: CPTII,S$GLB,, | Performed by: INTERNAL MEDICINE

## 2023-12-28 PROCEDURE — 1160F RVW MEDS BY RX/DR IN RCRD: CPT | Mod: CPTII,S$GLB,, | Performed by: INTERNAL MEDICINE

## 2023-12-28 PROCEDURE — 1160F PR REVIEW ALL MEDS BY PRESCRIBER/CLIN PHARMACIST DOCUMENTED: ICD-10-PCS | Mod: CPTII,S$GLB,, | Performed by: INTERNAL MEDICINE

## 2023-12-28 PROCEDURE — 3074F SYST BP LT 130 MM HG: CPT | Mod: CPTII,S$GLB,, | Performed by: INTERNAL MEDICINE

## 2023-12-28 PROCEDURE — 99213 OFFICE O/P EST LOW 20 MIN: CPT | Mod: S$GLB,,, | Performed by: INTERNAL MEDICINE

## 2023-12-28 PROCEDURE — 3008F BODY MASS INDEX DOCD: CPT | Mod: CPTII,S$GLB,, | Performed by: INTERNAL MEDICINE

## 2023-12-28 PROCEDURE — 99213 PR OFFICE/OUTPT VISIT, EST, LEVL III, 20-29 MIN: ICD-10-PCS | Mod: S$GLB,,, | Performed by: INTERNAL MEDICINE

## 2023-12-28 PROCEDURE — 3008F PR BODY MASS INDEX (BMI) DOCUMENTED: ICD-10-PCS | Mod: CPTII,S$GLB,, | Performed by: INTERNAL MEDICINE

## 2023-12-28 PROCEDURE — 1159F MED LIST DOCD IN RCRD: CPT | Mod: CPTII,S$GLB,, | Performed by: INTERNAL MEDICINE

## 2023-12-28 PROCEDURE — 3079F PR MOST RECENT DIASTOLIC BLOOD PRESSURE 80-89 MM HG: ICD-10-PCS | Mod: CPTII,S$GLB,, | Performed by: INTERNAL MEDICINE

## 2023-12-28 PROCEDURE — 99999 PR PBB SHADOW E&M-EST. PATIENT-LVL III: ICD-10-PCS | Mod: PBBFAC,,, | Performed by: INTERNAL MEDICINE

## 2023-12-28 PROCEDURE — 3074F PR MOST RECENT SYSTOLIC BLOOD PRESSURE < 130 MM HG: ICD-10-PCS | Mod: CPTII,S$GLB,, | Performed by: INTERNAL MEDICINE

## 2023-12-28 PROCEDURE — 3079F DIAST BP 80-89 MM HG: CPT | Mod: CPTII,S$GLB,, | Performed by: INTERNAL MEDICINE

## 2023-12-28 PROCEDURE — 99999 PR PBB SHADOW E&M-EST. PATIENT-LVL III: CPT | Mod: PBBFAC,,, | Performed by: INTERNAL MEDICINE

## 2023-12-28 NOTE — PROGRESS NOTES
HISTORY:    44 yo F w a h/o breast cancer s/p bilateral mastectomy with implants' 20 followed by SHAYNE due to complications with implants '22, varicose veins, prev tob presenting for follow-up.    The patient denies any symptoms of chest pain, shortness of breath, or dyspnea on exertion.     Patient comes in to discuss lower extremities venous prominence that she has noted over the last year. No edema or claudication. Does notice that the hair on the back of her calves has stopped growing.     Very active in her life. Walks for exercise and does stair climbing/leg lifts without issue. Works in the [employer] business and is on her feet or 16 hours/day.     The patient denies any previous history of myocardial infarction, coronary artery disease, peripheral arterial disease, stroke, congestive heart failure, or cardiomyopathy. + FH of premature CAD- father MI in his 30s.    After initial visit we did a coronary calcium score, NICOLÁS, and venous reflux study which were all normal.       PHYSICAL EXAM:    Vitals:    12/28/23 0840   BP: 119/84   Pulse: 84   Resp: 18       NAD, A+Ox3.  No jvd, no bruit.  RRR nml s1,s2. No murmurs.  CTA B no wheezes or crackles.  No edema. + small varicosities. Palpable DP/PT bilaterally.    LABS/STUDIES (imaging reviewed during clinic visit):    2022 CBC and CMP normal.  2021  and HDL 47.  Triglycerides 94.  TSH normal.    ECG 2023 demonstrates sinus rhythm with no Q-waves or ST changes.    Coronary calcium score Agatston score of 0.   NICOLÁS September 2023 normal bilaterally.    Venous insufficiency study October 2023 no evidence of DVT or venous insufficiency bilaterally.        ASSESSMENT & PLAN:    1. Varicose veins of calf                  Pt with varicose veins. Small on exam, asymptomatic. Expectant management is fine. Reaffirmed that these are not linked w significant increase in CV ds.    +FH premature CAD and prev tob use. Bps controlled on home log. Coronary calcium score of  0 w an .     Eats healthy and exercises. Has quit tob.     Follow up if symptoms worsen or fail to improve.      Jerson Milligan MD

## 2024-01-05 ENCOUNTER — PATIENT MESSAGE (OUTPATIENT)
Dept: SURGERY | Facility: CLINIC | Age: 44
End: 2024-01-05
Payer: COMMERCIAL

## 2024-02-29 ENCOUNTER — OFFICE VISIT (OUTPATIENT)
Dept: FAMILY MEDICINE | Facility: CLINIC | Age: 44
End: 2024-02-29
Payer: COMMERCIAL

## 2024-02-29 VITALS
WEIGHT: 143.31 LBS | TEMPERATURE: 98 F | HEIGHT: 67 IN | DIASTOLIC BLOOD PRESSURE: 82 MMHG | BODY MASS INDEX: 22.49 KG/M2 | SYSTOLIC BLOOD PRESSURE: 120 MMHG | HEART RATE: 77 BPM | OXYGEN SATURATION: 98 %

## 2024-02-29 DIAGNOSIS — Z12.11 COLON CANCER SCREENING: ICD-10-CM

## 2024-02-29 DIAGNOSIS — H00.012 HORDEOLUM EXTERNUM OF RIGHT LOWER EYELID: ICD-10-CM

## 2024-02-29 DIAGNOSIS — Z00.00 ANNUAL PHYSICAL EXAM: Primary | ICD-10-CM

## 2024-02-29 DIAGNOSIS — R11.11 VOMITING WITHOUT NAUSEA, UNSPECIFIED VOMITING TYPE: ICD-10-CM

## 2024-02-29 PROCEDURE — 99214 OFFICE O/P EST MOD 30 MIN: CPT | Mod: 25,S$GLB,, | Performed by: FAMILY MEDICINE

## 2024-02-29 PROCEDURE — 3079F DIAST BP 80-89 MM HG: CPT | Mod: CPTII,S$GLB,, | Performed by: FAMILY MEDICINE

## 2024-02-29 PROCEDURE — 3008F BODY MASS INDEX DOCD: CPT | Mod: CPTII,S$GLB,, | Performed by: FAMILY MEDICINE

## 2024-02-29 PROCEDURE — 1160F RVW MEDS BY RX/DR IN RCRD: CPT | Mod: CPTII,S$GLB,, | Performed by: FAMILY MEDICINE

## 2024-02-29 PROCEDURE — 99999 PR PBB SHADOW E&M-EST. PATIENT-LVL IV: CPT | Mod: PBBFAC,,, | Performed by: FAMILY MEDICINE

## 2024-02-29 PROCEDURE — 99396 PREV VISIT EST AGE 40-64: CPT | Mod: S$GLB,,, | Performed by: FAMILY MEDICINE

## 2024-02-29 PROCEDURE — 3074F SYST BP LT 130 MM HG: CPT | Mod: CPTII,S$GLB,, | Performed by: FAMILY MEDICINE

## 2024-02-29 PROCEDURE — 1159F MED LIST DOCD IN RCRD: CPT | Mod: CPTII,S$GLB,, | Performed by: FAMILY MEDICINE

## 2024-02-29 RX ORDER — ERYTHROMYCIN 5 MG/G
OINTMENT OPHTHALMIC EVERY 6 HOURS
Qty: 3.5 G | Refills: 0 | Status: SHIPPED | OUTPATIENT
Start: 2024-02-29

## 2024-02-29 NOTE — PROGRESS NOTES
Assessment & Plan:    Annual physical exam  -     CBC Auto Differential; Future; Expected date: 02/29/2024  -     Comprehensive Metabolic Panel; Future; Expected date: 02/29/2024  -     Hemoglobin A1C; Future; Expected date: 02/29/2024  -     Lipid Panel; Future; Expected date: 02/29/2024    Fasting labs to be scheduled.    Hordeolum externum of right lower eyelid  -     erythromycin (ROMYCIN) ophthalmic ointment; Place into the right eye every 6 (six) hours.  Dispense: 3.5 g; Refill: 0    Start topical antibiotic.    Vomiting without nausea, unspecified vomiting type  Unclear etiology. Recommended GI evaluation. She will monitor her symptoms and follow up if she would like the referral.    Colon cancer screening  -     Cologuard Screening (Multitarget Stool DNA); Future; Expected date: 02/29/2024    Recommend colonoscopy, in light of emesis, but patient declines. Cologuard to be submitted. She is aware that a diagnostic colonoscopy is needed if this is positive.       Health maintenance reviewed & addressed above.     Declines vaccines.     Follow-up: Follow up in about 1 year (around 2/28/2025).  ______________________________________________________________________    Chief Complaint  Chief Complaint   Patient presents with    Annual Exam       HPI  Alton Bach is a 43 y.o. female with medical diagnoses as listed in the medical history and problem list that presents to the office for an annual exam. She has a stye on the right lower eye lid that has been present for a month. She works at [employer] in the basement level of the [employer] and has been having episodes of vomiting, only at work. Denies appetite change, nausea, abdominal pain, diarrhea, constipation. She is concerned she is being exposed to something in the environment. She states that there is no chance she is pregnant. She has been having some HA behind the right eye over the last 2 weeks. Admits to not drinking enough water.     Health Maintenance          Date Due Completion Date    COVID-19 Vaccine (1) Never done ---    Pneumococcal Vaccines (Age 0-64) (1 of 2 - PCV) Never done ---    TETANUS VACCINE Never done ---    Influenza Vaccine (1) Never done ---    Colorectal Cancer Screening Never done ---    Cervical Cancer Screening 02/09/2025 2/9/2022    Lipid Panel 12/03/2026 12/3/2021              PAST MEDICAL HISTORY:  Past Medical History:   Diagnosis Date    Anticoagulant long-term use     DCIS (ductal carcinoma in situ)        PAST SURGICAL HISTORY:  Past Surgical History:   Procedure Laterality Date    breast augmentation      BREAST BIOPSY Right     BREAST CAPSULOTOMY Left 07/05/2022    Procedure: CAPSULOTOMY, BREAST;  Surgeon: Haresh Porter MD;  Location: [facility] OR [address] [place] [address];  Service: Plastics;  Laterality: Left;    BREAST RECONSTRUCTION Bilateral     Flap reconstruction 5/2022    CHOLECYSTECTOMY      MASTECTOMY Bilateral 10/12/2020    Procedure: MASTECTOMY-Bilateral ;  Surgeon: Anahi Huitron MD;  Location: [address];  Service: General;  Laterality: Bilateral;    REPLACEMENT OF IMPLANT OF BREAST Bilateral 10/12/2020    Procedure: REPLACEMENT, IMPLANT, BREAST-Bilateral;  Surgeon: Haresh Porter MD;  Location: [address];  Service: Plastics;  Laterality: Bilateral;    REPLACEMENT OF IMPLANT OF BREAST Left 11/04/2020    Procedure: Left Breast Implant Exchange;  Surgeon: Haresh Porter MD;  Location: [address];  Service: Plastics;  Laterality: Left;    REVISION OF BREAST IMPLANT Left 07/05/2022    Procedure: REVISION OF PROCEDURE INVOLVING BREAST IMPLANT;  Surgeon: Haresh Porter MD;  Location: [address];  Service: Plastics;  Laterality: Left;  lewis breast    SENTINEL LYMPH NODE BIOPSY Bilateral 10/12/2020    Procedure: BIOPSY, LYMPH NODE, SENTINEL-Bilateral;  Surgeon: Anahi Huitron MD;  Location: [address];  Service: General;  Laterality: Bilateral;       SOCIAL HISTORY:  Social History      Socioeconomic History    Marital status: Single   Tobacco Use    Smoking status: Former     Current packs/day: 0.00     Types: Cigarettes     Quit date: 2022     Years since quittin.4    Smokeless tobacco: Never   Substance and Sexual Activity    Alcohol use: No    Drug use: No       FAMILY HISTORY:  Family History   Problem Relation Age of Onset    Hypertension Mother     Heart disease Father        ALLERGIES AND MEDICATIONS: updated and reviewed.  Review of patient's allergies indicates:   Allergen Reactions    Ciprofloxacin Hives, Shortness Of Breath and Itching    Cleocin [clindamycin hcl] Hives and Swelling    Coconut Swelling and Rash    Dye Swelling and Blisters     Hair Dye    Strawberry Swelling and Rash    Bacitracin Rash     Current Outpatient Medications   Medication Sig Dispense Refill    ascorbic acid, vitamin C, (VITAMIN C) 100 MG tablet Take 100 mg by mouth.      BIOTIN ORAL Take by mouth.      VITAMIN B COMPLEX ORAL Take 1 tablet by mouth.      vitamin E 400 UNIT capsule Take 400 Units by mouth once daily.      erythromycin (ROMYCIN) ophthalmic ointment Place into the right eye every 6 (six) hours. 3.5 g 0    fluticasone propionate (FLONASE) 50 mcg/actuation nasal spray 1 spray by Each Nostril route once daily.       No current facility-administered medications for this visit.     Facility-Administered Medications Ordered in Other Visits   Medication Dose Route Frequency Provider Last Rate Last Admin    heparin (porcine) injection 5,000 Units  5,000 Units Subcutaneous Once Karen Garcia MD        sodium chloride 0.9% flush 10 mL  10 mL Intravenous PRN Karen Garcia MD             ROS  Review of Systems   Constitutional:  Negative for appetite change and fever.   Gastrointestinal:  Positive for vomiting. Negative for abdominal pain, constipation, diarrhea and nausea.   Genitourinary:  Negative for dysuria and urgency.   Neurological:  Positive for headaches.   Psychiatric/Behavioral:   "The patient is not nervous/anxious.            Physical Exam  Vitals:    02/29/24 0815   BP: 120/82   Pulse: 77   Temp: 97.6 °F (36.4 °C)   TempSrc: Oral   SpO2: 98%   Weight: 65 kg (143 lb 4.8 oz)   Height: 5' 7" (1.702 m)    Body mass index is 22.44 kg/m².  Weight: 65 kg (143 lb 4.8 oz)   Height: 5' 7" (170.2 cm)   Physical Exam  Constitutional:       General: She is not in acute distress.     Appearance: Normal appearance.   HENT:      Head: Normocephalic and atraumatic.   Neck:      Thyroid: No thyromegaly.      Vascular: No carotid bruit.   Cardiovascular:      Rate and Rhythm: Normal rate and regular rhythm.      Pulses: Normal pulses.      Heart sounds: Normal heart sounds.   Pulmonary:      Effort: Pulmonary effort is normal. No respiratory distress.      Breath sounds: Normal breath sounds.   Musculoskeletal:      Cervical back: Neck supple.      Right lower leg: No edema.      Left lower leg: No edema.   Lymphadenopathy:      Cervical: No cervical adenopathy.   Skin:     General: Skin is warm and dry.      Findings: No rash.   Neurological:      General: No focal deficit present.      Mental Status: She is alert and oriented to person, place, and time.   Psychiatric:         Mood and Affect: Mood normal.         Behavior: Behavior normal.         Thought Content: Thought content normal.             "

## 2024-03-06 ENCOUNTER — LAB VISIT (OUTPATIENT)
Dept: LAB | Facility: HOSPITAL | Age: 44
End: 2024-03-06
Attending: FAMILY MEDICINE
Payer: COMMERCIAL

## 2024-03-06 DIAGNOSIS — Z00.00 ANNUAL PHYSICAL EXAM: ICD-10-CM

## 2024-03-06 LAB
ALBUMIN SERPL BCP-MCNC: 3.6 G/DL (ref 3.5–5.2)
ALP SERPL-CCNC: 60 U/L (ref 55–135)
ALT SERPL W/O P-5'-P-CCNC: 15 U/L (ref 10–44)
ANION GAP SERPL CALC-SCNC: 8 MMOL/L (ref 8–16)
AST SERPL-CCNC: 12 U/L (ref 10–40)
BASOPHILS # BLD AUTO: 0.05 K/UL (ref 0–0.2)
BASOPHILS NFR BLD: 0.7 % (ref 0–1.9)
BILIRUB SERPL-MCNC: 0.3 MG/DL (ref 0.1–1)
BUN SERPL-MCNC: 7 MG/DL (ref 6–20)
CALCIUM SERPL-MCNC: 8.8 MG/DL (ref 8.7–10.5)
CHLORIDE SERPL-SCNC: 105 MMOL/L (ref 95–110)
CHOLEST SERPL-MCNC: 155 MG/DL (ref 120–199)
CHOLEST/HDLC SERPL: 4.1 {RATIO} (ref 2–5)
CO2 SERPL-SCNC: 24 MMOL/L (ref 23–29)
CREAT SERPL-MCNC: 0.7 MG/DL (ref 0.5–1.4)
DIFFERENTIAL METHOD BLD: ABNORMAL
EOSINOPHIL # BLD AUTO: 0.1 K/UL (ref 0–0.5)
EOSINOPHIL NFR BLD: 1.4 % (ref 0–8)
ERYTHROCYTE [DISTWIDTH] IN BLOOD BY AUTOMATED COUNT: 13.9 % (ref 11.5–14.5)
EST. GFR  (NO RACE VARIABLE): >60 ML/MIN/1.73 M^2
ESTIMATED AVG GLUCOSE: 111 MG/DL (ref 68–131)
GLUCOSE SERPL-MCNC: 79 MG/DL (ref 70–110)
HBA1C MFR BLD: 5.5 % (ref 4–5.6)
HCT VFR BLD AUTO: 40.1 % (ref 37–48.5)
HDLC SERPL-MCNC: 38 MG/DL (ref 40–75)
HDLC SERPL: 24.5 % (ref 20–50)
HGB BLD-MCNC: 12.8 G/DL (ref 12–16)
IMM GRANULOCYTES # BLD AUTO: 0.02 K/UL (ref 0–0.04)
IMM GRANULOCYTES NFR BLD AUTO: 0.3 % (ref 0–0.5)
LDLC SERPL CALC-MCNC: 104.2 MG/DL (ref 63–159)
LYMPHOCYTES # BLD AUTO: 2.6 K/UL (ref 1–4.8)
LYMPHOCYTES NFR BLD: 36.5 % (ref 18–48)
MCH RBC QN AUTO: 29.6 PG (ref 27–31)
MCHC RBC AUTO-ENTMCNC: 31.9 G/DL (ref 32–36)
MCV RBC AUTO: 93 FL (ref 82–98)
MONOCYTES # BLD AUTO: 0.6 K/UL (ref 0.3–1)
MONOCYTES NFR BLD: 8.7 % (ref 4–15)
NEUTROPHILS # BLD AUTO: 3.7 K/UL (ref 1.8–7.7)
NEUTROPHILS NFR BLD: 52.4 % (ref 38–73)
NONHDLC SERPL-MCNC: 117 MG/DL
NRBC BLD-RTO: 0 /100 WBC
PLATELET # BLD AUTO: 354 K/UL (ref 150–450)
PMV BLD AUTO: 10.8 FL (ref 9.2–12.9)
POTASSIUM SERPL-SCNC: 3.9 MMOL/L (ref 3.5–5.1)
PROT SERPL-MCNC: 6.6 G/DL (ref 6–8.4)
RBC # BLD AUTO: 4.33 M/UL (ref 4–5.4)
SODIUM SERPL-SCNC: 137 MMOL/L (ref 136–145)
TRIGL SERPL-MCNC: 64 MG/DL (ref 30–150)
WBC # BLD AUTO: 7.09 K/UL (ref 3.9–12.7)

## 2024-03-06 PROCEDURE — 80061 LIPID PANEL: CPT | Performed by: FAMILY MEDICINE

## 2024-03-06 PROCEDURE — 85025 COMPLETE CBC W/AUTO DIFF WBC: CPT | Performed by: FAMILY MEDICINE

## 2024-03-06 PROCEDURE — 36415 COLL VENOUS BLD VENIPUNCTURE: CPT | Mod: PO | Performed by: FAMILY MEDICINE

## 2024-03-06 PROCEDURE — 80053 COMPREHEN METABOLIC PANEL: CPT | Performed by: FAMILY MEDICINE

## 2024-03-06 PROCEDURE — 83036 HEMOGLOBIN GLYCOSYLATED A1C: CPT | Performed by: FAMILY MEDICINE

## 2024-05-08 NOTE — ANESTHESIA POSTPROCEDURE EVALUATION
Anesthesia Post Evaluation    Patient: Alton Bach    Procedure(s) Performed: Procedure(s) (LRB):  REVISION OF PROCEDURE INVOLVING BREAST IMPLANT (Left)  CAPSULOTOMY, BREAST (Left)  CAPSULORRHAPHY (Left)    Final Anesthesia Type: general      Patient location during evaluation: PACU  Patient participation: Yes- Able to Participate  Level of consciousness: awake and alert  Post-procedure vital signs: reviewed and stable  Pain management: adequate  Airway patency: patent    PONV status at discharge: No PONV  Anesthetic complications: no      Cardiovascular status: blood pressure returned to baseline  Respiratory status: unassisted  Hydration status: euvolemic  Follow-up not needed.          Vitals Value Taken Time   /56 07/05/22 1441   Temp 36.7 °C (98.1 °F) 07/05/22 1441   Pulse 82 07/05/22 1441   Resp 16 07/05/22 1441   SpO2 98 % 07/05/22 1441         No case tracking events are documented in the log.      Pain/Mehrdad Score: No data recorded       Please see resident note for full details regarding the service.     PE: A+Ox3, no murmurs, lungs CTA b/l, irregular pulse, abd NT/ND, no lower extremity swelling, no FND, venous stasis changes b/l     Assessment:   - Acute onset shortness of breath secondary to CHF exacerbation on CHFpEF (PNA ruled out)   - ZORAIDA on CKD (cardiorenal vs. CKD progression)   - Normocytic anemia   - Elevated D-dimer -> PE ruled out   - Hyperglycemia in the setting of Type 2 DM   - Hypertension   - Hypocalcemia, hyperphosphatemia, elevated PTH likely secondary hyperparathyroidism in the setting of CKD (r/o Vitamin D deficiency)   - Hypoalbuminemia   - History of T2DM, HTN, HLD, anemia, CKD     Plan:   - EKG ordered given irregular pulse on palpation, will review -> on telemetry NSR with PACs   - Change to Lasix 40 mg IVP daily -> transition to PO in AM   - ECHO: EF 55-60%, moderately increased posterior wall thickness. Severely increased septal wall thickness, Mild mitral valve regurgitation, Mild tricuspid regurgitation, severe pulmonary hypertension   - I spoke to cardiology yesterday, increased septal wall thickness likely secondary to HTN -> needs better management, started on Hydralazine yesterday for afterload reduction   - HbA1c 6.8, c/w ISS (accuchecks 104-218)   - Lipid elevated -> on Zetia and statin, will continue   - TSH WNL  - V/Q scan was negative for PE and heparin was d/c   - PTH elevated, Ca low (even with correction), phosphorous high -> suggests secondary hyperparathyroidism likely secondary to CKD   - Nephrology recs appreciated - hold ACE/ARB, Farxiga given renal function, Phoslo for high phosphorous   - f/u Vitamin D level, CT abd/pelvis given pelvic fullness per nephrology   - //80 -> Hydralazine increased to 25 mg TID yesterday, monitor BP today if it remains elevated will need to add an additional agent  - Cardio recs appreciated: Recommend outpatient evaluation for restrictive or infiltrative cardiomyopathy due to significant LVH   - PT consult   - DVT ppx: Heparin drip -> will d/c if V/Q negative   - Dispo: active, need to change to PO lasix, CT abd/pelvis today -> anticipate d/c in 24-48 hours    I spent a total of 45 minutes on the date of this encounter coordinating the patient's care. This includes reviewing results/imaging and discussions with specialists, nursing, case management/social work. Further tests, medications, and procedures have been ordered as indicated. Results and the plan of care were communicated to the patient and/or their family member. Supporting documentation was completed and added to the patient's chart. Please see resident note for full details regarding the service.     PE: A+Ox3, no murmurs, lungs CTA b/l, abd NT/ND, no lower extremity swelling, no FND, venous stasis changes b/l     Assessment:   - Acute onset shortness of breath secondary to CHF exacerbation (PNA ruled out)   - ZORAIDA on CKD   - Normocytic anemia   - Elevated D-dimer   - Hyperglycemia in the setting of Type 2 DM   - Hypertension   - Hypocalcemia   - Hypoalbuminemia   - History of T2DM, HTN, HLD, anemia, CKD     Plan:   - Reviewed CXR: increased vascular congestion, BNP 1476 favors CHF, clinically doubt PNA   - c/w Lasix 40 mg IVP BID   - Pt without significant cough/sputum production, no fevers, no WBC, RVP negative -> d/c abx   - f/u ECHO, HbA1c, lipids, TSH, phosphorous, magnesium, V/Q scan   - Lower extremity dopplers: No evidence of deep venous thrombosis in either lower extremity   - Renal US: Trace bilateral pelvic fullness versus extrarenal pelves.  If indicated, CT could be obtained for further evaluation   - ISS for now -> adjust if accuchecks are elevated   - Monitor BP closely   - Cardiology consult   - Nephrology consult   - PT consult   - DVT ppx: Heparin drip -> will d/c if V/Q negative   - Dispo: active, cardiology consult, diuresis -> anticipate d/c in 48-72 hours     I spent a total of 45 minutes on the date of this encounter coordinating the patient's care. This includes reviewing results/imaging and discussions with specialists, nursing, case management/social work. Further tests, medications, and procedures have been ordered as indicated. Results and the plan of care were communicated to the patient and/or their family member. Supporting documentation was completed and added to the patient's chart. Please see resident note for full details regarding the service.     PE: A+Ox3, no murmurs, lungs CTA b/l, abd NT/ND, no lower extremity swelling, no FND, venous stasis changes b/l    Assessment:   - Acute onset shortness of breath secondary to cardiorenal syndrome less likely CHFpEF (PNA ruled out)   - ZORAIDA on CKD (cardiorenal vs. CKD progression)   - Normocytic anemia   - Elevated D-dimer -> PE ruled out   - Hyperglycemia in the setting of Type 2 DM   - Hypertension   - Hypocalcemia, hyperphosphatemia, elevated PTH likely secondary hyperparathyroidism in the setting of CKD (r/o Vitamin D deficiency)  - Severe pulmonary HTN    - Severely increased septal wall thickness  - Mild mitral valve regurgitation, mild tricuspid regurgitation   - Hypoalbuminemia   - History of T2DM, HTN, HLD, anemia, CKD     Plan:   - c/w Lasix 40 mg IVP daily -> transition to PO Lasix in AM   - //87 -> increased hydralazine to 50 mg Q8H, monitor BP closely   - Accucheck in acceptable range, c/w ISS   - Nephrology recs appreciated - hold ACE/ARB, Farxiga given renal function, Phoslo for high phosphorous   - CT abd/pelvis: No acute pathology in the abdomen or pelvis. Small pleural effusions with bibasilar atelectasis, right greater than left  - Vitamin D level within normal limits   - Cardio recs appreciated: Recommend outpatient evaluation for restrictive or infiltrative cardiomyopathy due to significant LVH   - PT - Home PT   - DVT ppx: subq Heparin   - Dispo: active, change to PO lasix in AM, d/c home in 24 hours    I spent a total of 45 minutes on the date of this encounter coordinating the patient's care. This includes reviewing results/imaging and discussions with specialists, nursing, case management/social work. Further tests, medications, and procedures have been ordered as indicated. Results and the plan of care were communicated to the patient and/or their family member. Supporting documentation was completed and added to the patient's chart.

## 2024-06-11 ENCOUNTER — OFFICE VISIT (OUTPATIENT)
Dept: SURGERY | Facility: CLINIC | Age: 44
End: 2024-06-11
Payer: COMMERCIAL

## 2024-06-11 VITALS
WEIGHT: 143 LBS | HEART RATE: 96 BPM | DIASTOLIC BLOOD PRESSURE: 85 MMHG | SYSTOLIC BLOOD PRESSURE: 134 MMHG | BODY MASS INDEX: 22.44 KG/M2 | HEIGHT: 67 IN

## 2024-06-11 DIAGNOSIS — N63.0 PALPABLE MASS OF BREAST: ICD-10-CM

## 2024-06-11 DIAGNOSIS — N64.4 BREAST PAIN IN FEMALE: ICD-10-CM

## 2024-06-11 DIAGNOSIS — Z85.3 PERSONAL HISTORY OF BREAST CANCER: Primary | ICD-10-CM

## 2024-06-11 DIAGNOSIS — Z90.13 S/P MASTECTOMY, BILATERAL: ICD-10-CM

## 2024-06-11 PROCEDURE — 1159F MED LIST DOCD IN RCRD: CPT | Mod: CPTII,S$GLB,, | Performed by: PHYSICIAN ASSISTANT

## 2024-06-11 PROCEDURE — 99999 PR PBB SHADOW E&M-EST. PATIENT-LVL III: CPT | Mod: PBBFAC,,, | Performed by: PHYSICIAN ASSISTANT

## 2024-06-11 PROCEDURE — 3008F BODY MASS INDEX DOCD: CPT | Mod: CPTII,S$GLB,, | Performed by: PHYSICIAN ASSISTANT

## 2024-06-11 PROCEDURE — 3075F SYST BP GE 130 - 139MM HG: CPT | Mod: CPTII,S$GLB,, | Performed by: PHYSICIAN ASSISTANT

## 2024-06-11 PROCEDURE — 99214 OFFICE O/P EST MOD 30 MIN: CPT | Mod: S$GLB,,, | Performed by: PHYSICIAN ASSISTANT

## 2024-06-11 PROCEDURE — 3079F DIAST BP 80-89 MM HG: CPT | Mod: CPTII,S$GLB,, | Performed by: PHYSICIAN ASSISTANT

## 2024-06-11 PROCEDURE — 3044F HG A1C LEVEL LT 7.0%: CPT | Mod: CPTII,S$GLB,, | Performed by: PHYSICIAN ASSISTANT

## 2024-06-11 NOTE — PROGRESS NOTES
"[facility]  Department of Surgery    REFERRING PROVIDER: No referring provider defined for this encounter.    Chief Complaint: Breast Cyst    DIAGNOSIS:   This is a 43 y.o. 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    TREATMENT:   1. Bilateral  mastectomy with sentinel node biopsy on 10/12/2020. Anahi vu M.D. Surgical Oncology.   2. Initial implant reconstruction with Dr. Porter, but due to complications, she eventually underwent explant and a bilateral MARY flap was performed with Dr. Shine in May of 2023.    HISTORY OF PRESENT ILLNESS:   Alton Bach is a 43 y.o. female comes in for evaluation new palpable masses appreciated on self exam. Still noted bilateral tenderness/tightness of her chest wall. There have been no changes to family history. The patient denies constitutional symptoms of night sweats, weight loss, new headaches, visual changes, new back or bony pain, chest pain, or shortness of breath.      IMAGING:   None    PHYSICAL EXAM:   /85   Pulse 96   Ht 5' 7" (1.702 m)   Wt 64.9 kg (143 lb)   BMI 22.40 kg/m²   Physical Exam   Vitals reviewed.  Constitutional: She is oriented to person, place, and time. She appears well-developed. No distress.   HENT:   Head: Normocephalic and atraumatic.   Eyes: Pupils are equal, round, and reactive to light.   Pulmonary/Chest: Effort normal and breath sounds normal. No respiratory distress. She exhibits no mass, no tenderness and no edema. Right breast exhibits no mass, no skin change and no tenderness. Left breast exhibits no mass, no skin change and no tenderness.       Abdominal: Soft.   Neurological: She is alert and oriented to person, place, and time.   Skin: Skin is warm and dry. She is not diaphoretic. No erythema.     Psychiatric: Her behavior is normal.        ASSESSMENT:   This is a 43 y.o. female with new bilateral breasts findings as well as pain after recent MARY flap " reconstruction in May 2023.      PLAN:   1. Long discussion regarding her breast pain as well as the new palpable masses of her bilateral flap reconstruction. Again explained that her pain is likely due to scar tissue formation.   2. Patient was discussed with Radiology who prefers mammogram to further evaluate, but agree it is all likely benign findings.   3. Will proceed with repeat bilateral mammogram to ensure no change, but my suspicion is that these changes are consistent with oil cysts.  4. The patient is in agreement with the plan. Questions were encouraged and answered to patient's satisfaction. Alton will call our office with any questions or concerns.     Susana Segundo PA-C  Breast Surgery

## 2024-06-11 NOTE — Clinical Note
Babatunde Flor,   This is the patient I mentioned who has undergone bilateral mastectomies with recon who is concerned about new palpable changes. Would like to proceed with diagnostic mammogram pls and thank you!  Susana

## 2024-06-12 ENCOUNTER — TELEPHONE (OUTPATIENT)
Dept: RADIOLOGY | Facility: HOSPITAL | Age: 44
End: 2024-06-12
Payer: COMMERCIAL

## 2024-06-12 NOTE — TELEPHONE ENCOUNTER
----- Message from Susana Segundo PA-C sent at 6/11/2024  4:41 PM CDT -----  Babatunde Flor,     This is the patient I mentioned who has undergone bilateral mastectomies with recon who is concerned about new palpable changes. Would like to proceed with diagnostic mammogram pls and thank you!    Susana

## 2024-06-18 ENCOUNTER — HOSPITAL ENCOUNTER (OUTPATIENT)
Dept: RADIOLOGY | Facility: HOSPITAL | Age: 44
Discharge: HOME | End: 2024-06-18
Attending: PHYSICIAN ASSISTANT
Payer: COMMERCIAL

## 2024-06-18 DIAGNOSIS — N64.4 BREAST PAIN IN FEMALE: ICD-10-CM

## 2024-06-18 DIAGNOSIS — Z85.3 PERSONAL HISTORY OF BREAST CANCER: ICD-10-CM

## 2024-06-18 DIAGNOSIS — N63.0 PALPABLE MASS OF BREAST: ICD-10-CM

## 2024-06-18 DIAGNOSIS — Z90.13 S/P MASTECTOMY, BILATERAL: ICD-10-CM

## 2024-06-18 PROCEDURE — 77062 BREAST TOMOSYNTHESIS BI: CPT | Mod: TC

## 2024-06-18 PROCEDURE — 77066 DX MAMMO INCL CAD BI: CPT | Mod: 26,,, | Performed by: RADIOLOGY

## 2024-06-18 PROCEDURE — 77062 BREAST TOMOSYNTHESIS BI: CPT | Mod: 26,,, | Performed by: RADIOLOGY

## 2024-07-31 ENCOUNTER — TELEPHONE (OUTPATIENT)
Dept: FAMILY MEDICINE | Facility: CLINIC | Age: 44
End: 2024-07-31
Payer: COMMERCIAL

## 2024-10-23 ENCOUNTER — PATIENT MESSAGE (OUTPATIENT)
Dept: FAMILY MEDICINE | Facility: CLINIC | Age: 44
End: 2024-10-23
Payer: COMMERCIAL

## 2024-10-24 NOTE — TELEPHONE ENCOUNTER
Attempted to contact pt, no answer, left VM instructing pt to read Ship[employer] message or return call to clinic.

## 2024-10-25 ENCOUNTER — OFFICE VISIT (OUTPATIENT)
Dept: FAMILY MEDICINE | Facility: CLINIC | Age: 44
End: 2024-10-25
Payer: COMMERCIAL

## 2024-10-25 VITALS
SYSTOLIC BLOOD PRESSURE: 122 MMHG | HEART RATE: 81 BPM | BODY MASS INDEX: 23.7 KG/M2 | WEIGHT: 151 LBS | OXYGEN SATURATION: 98 % | DIASTOLIC BLOOD PRESSURE: 84 MMHG | TEMPERATURE: 98 F | HEIGHT: 67 IN

## 2024-10-25 DIAGNOSIS — R19.00 SWELLING OF ABDOMEN: Primary | ICD-10-CM

## 2024-10-25 PROCEDURE — 99999 PR PBB SHADOW E&M-EST. PATIENT-LVL III: CPT | Mod: PBBFAC,,,

## 2024-10-25 NOTE — PROGRESS NOTES
HPI     Chief Complaint:  Chief Complaint   Patient presents with    stomach problems       Alton Bach is a 44 y.o. female with multiple medical diagnoses as listed in the medical history and problem list that presents for   Chief Complaint   Patient presents with    stomach problems   .   Patient is not known to me with her last appointment in this department on Visit date not found.      HPI    Had abd surgery(reconstruction) to abd after breast CA, had revision in 2023, will develop bulge to left side of stomach. Took dulcolax to make sure she wasn't constipated. No relief. No pain but does have burning sensation or pulling sensation to left side of abd. Worse as day goes on. Stomach is flat in the morning but as day goes on she will develop bulge that extends up left side of abd. Started a few weeks ago. Previously went to [facility] and xray done in 2023 but these symptoms are different. Normal BMs.     Declines all HM vaccines    Other concerns below  Assessment & Plan       Problem List Items Addressed This Visit    None  Visit Diagnoses       Swelling of abdomen    -  Primary  No concern for acute abd. Could be r/t abd wall weakness from surgery, hernia, diastasis recti  US ordered and scheduled  ED precautions for acute pain or worsening bulge that does not reduce    Relevant Orders    US Abdomen Complete            --------------------------------------------      Health Maintenance:  Health Maintenance         Date Due Completion Date    COVID-19 Vaccine (1) Never done ---    Pneumococcal Vaccines (Age 0-64) (1 of 2 - PCV) Never done ---    TETANUS VACCINE Never done ---    Colorectal Cancer Screening Never done ---    Cervical Cancer Screening 02/28/2029 2/28/2024    Lipid Panel 03/06/2029 3/6/2024    RSV Vaccine (Age 60+ and Pregnant patients) (1 - 1-dose 75+ series) 10/19/2053 ---            Discussed the importance of overdue vaccines which were offered during this encounter. Patient declined  "overdue vaccines at this time    Follow Up:  No follow-ups on file.    Discussed DDx, condition, and treatment.   Education sent to patient portal/included in after visit summary.  ED precautions given.   Notify provider if symptoms do not resolve or increase in severity.   Patient verbalizes understanding and agrees with plan of care.    Exam     Review of Systems:  (as noted above)  Review of Systems    Physical Exam:   Physical Exam  Constitutional:       General: She is not in acute distress.     Appearance: Normal appearance. She is not toxic-appearing.   HENT:      Head: Normocephalic and atraumatic.      Mouth/Throat:      Mouth: Mucous membranes are moist.   Eyes:      Conjunctiva/sclera: Conjunctivae normal.   Pulmonary:      Effort: Pulmonary effort is normal.   Abdominal:      General: Bowel sounds are normal. There is no distension.      Palpations: Abdomen is soft. There is no mass.      Tenderness: There is abdominal tenderness (mild to left side of abd/periumbilical). There is no guarding or rebound.      Hernia: No hernia is present.   Musculoskeletal:      Cervical back: Normal range of motion. No rigidity.   Lymphadenopathy:      Cervical: No cervical adenopathy.   Skin:     General: Skin is warm.      Capillary Refill: Capillary refill takes less than 2 seconds.      Findings: No bruising or erythema.   Neurological:      General: No focal deficit present.      Mental Status: She is alert and oriented to person, place, and time.   Psychiatric:         Mood and Affect: Mood normal.         Behavior: Behavior normal.       Vitals:    10/25/24 0832   BP: 122/84   Pulse: 81   Temp: 98.1 °F (36.7 °C)   TempSrc: Oral   SpO2: 98%   Weight: 68.5 kg (151 lb 0.2 oz)   Height: 5' 7" (1.702 m)      Body mass index is 23.65 kg/m².        History     Past Medical History:  Past Medical History:   Diagnosis Date    Anticoagulant long-term use     DCIS (ductal carcinoma in situ)        Past Surgical " History:  Past Surgical History:   Procedure Laterality Date    breast augmentation      BREAST BIOPSY Right     BREAST CAPSULOTOMY Left 2022    Procedure: CAPSULOTOMY, BREAST;  Surgeon: Haresh Porter MD;  Location: [address];  Service: Plastics;  Laterality: Left;    BREAST RECONSTRUCTION Bilateral     Flap reconstruction 2022    CHOLECYSTECTOMY      MASTECTOMY Bilateral 10/12/2020    Procedure: MASTECTOMY-Bilateral ;  Surgeon: Anahi Huitron MD;  Location: [address];  Service: General;  Laterality: Bilateral;    REPLACEMENT OF IMPLANT OF BREAST Bilateral 10/12/2020    Procedure: REPLACEMENT, IMPLANT, BREAST-Bilateral;  Surgeon: Haresh Porter MD;  Location: [facility] OR [address] [place] [address];  Service: Plastics;  Laterality: Bilateral;    REPLACEMENT OF IMPLANT OF BREAST Left 2020    Procedure: Left Breast Implant Exchange;  Surgeon: Haresh Porter MD;  Location: [address];  Service: Plastics;  Laterality: Left;    REVISION OF BREAST IMPLANT Left 2022    Procedure: REVISION OF PROCEDURE INVOLVING BREAST IMPLANT;  Surgeon: Haresh Porter MD;  Location: [address];  Service: Plastics;  Laterality: Left;  lewis breast    SENTINEL LYMPH NODE BIOPSY Bilateral 10/12/2020    Procedure: BIOPSY, LYMPH NODE, SENTINEL-Bilateral;  Surgeon: Anahi Huitron MD;  Location: [address];  Service: General;  Laterality: Bilateral;       Social History:  Social History     Socioeconomic History    Marital status: Single   Tobacco Use    Smoking status: Former     Current packs/day: 0.00     Types: Cigarettes     Quit date: 2022     Years since quittin.1    Smokeless tobacco: Never   Substance and Sexual Activity    Alcohol use: No    Drug use: No       Family History:  Family History   Problem Relation Name Age of Onset    Hypertension Mother      Heart disease Father         Allergies and Medications: (updated and reviewed)  Review of patient's allergies  indicates:   Allergen Reactions    Ciprofloxacin Hives, Shortness Of Breath and Itching    Cleocin [clindamycin hcl] Hives and Swelling    Coconut Swelling and Rash    Dye Swelling and Blisters     Hair Dye    Strawberry Swelling and Rash    Bacitracin Rash     No current outpatient medications on file.     No current facility-administered medications for this visit.     Facility-Administered Medications Ordered in Other Visits   Medication Dose Route Frequency Provider Last Rate Last Admin    heparin (porcine) injection 5,000 Units  5,000 Units Subcutaneous Once Karen Garcia MD        sodium chloride 0.9% flush 10 mL  10 mL Intravenous PRN Karen Garcia MD           Patient Care Team:  Nava Jenkins DO as PCP - General (Family Medicine)  Anahi Huitron MD (Inactive) as Consulting Physician (Surgery)  Taylor Carpenter LPN (Inactive) as Care Coordinator         - The patient is given an After Visit Summary that lists all medications with directions, allergies, education, orders placed during this encounter and follow-up instructions.      - I have reviewed the patient's medical information including past medical, family, and social history sections including the medications and allergies.      - We discussed the patient's current medications.     This note was created by combination of typed  and MModal dictation.  Transcription errors may be present.  If there are any questions, please contact me.

## 2024-11-07 ENCOUNTER — HOSPITAL ENCOUNTER (OUTPATIENT)
Dept: RADIOLOGY | Facility: HOSPITAL | Age: 44
Discharge: HOME | End: 2024-11-07
Payer: COMMERCIAL

## 2024-11-07 DIAGNOSIS — K76.89 HEPATIC CYST: ICD-10-CM

## 2024-11-07 DIAGNOSIS — R93.89 ABNORMAL ULTRASOUND: ICD-10-CM

## 2024-11-07 DIAGNOSIS — D73.4 CYST OF SPLEEN: Primary | ICD-10-CM

## 2024-11-07 DIAGNOSIS — R19.00 SWELLING OF ABDOMEN: ICD-10-CM

## 2024-11-07 PROCEDURE — 76700 US EXAM ABDOM COMPLETE: CPT | Mod: TC

## 2024-11-07 PROCEDURE — 76700 US EXAM ABDOM COMPLETE: CPT | Mod: 26,,, | Performed by: RADIOLOGY

## 2024-11-18 ENCOUNTER — HOSPITAL ENCOUNTER (EMERGENCY)
Facility: HOSPITAL | Age: 44
Discharge: HOME | End: 2024-11-18
Attending: EMERGENCY MEDICINE
Payer: COMMERCIAL

## 2024-11-18 ENCOUNTER — HOSPITAL ENCOUNTER (OUTPATIENT)
Dept: RADIOLOGY | Facility: HOSPITAL | Age: 44
Discharge: HOME | End: 2024-11-18
Payer: COMMERCIAL

## 2024-11-18 VITALS
TEMPERATURE: 98 F | HEIGHT: 67 IN | BODY MASS INDEX: 23.7 KG/M2 | HEART RATE: 80 BPM | OXYGEN SATURATION: 100 % | SYSTOLIC BLOOD PRESSURE: 114 MMHG | RESPIRATION RATE: 16 BRPM | DIASTOLIC BLOOD PRESSURE: 70 MMHG | WEIGHT: 151 LBS

## 2024-11-18 DIAGNOSIS — D73.4 CYST OF SPLEEN: ICD-10-CM

## 2024-11-18 DIAGNOSIS — K76.89 HEPATIC CYST: ICD-10-CM

## 2024-11-18 DIAGNOSIS — R55 VASOVAGAL NEAR SYNCOPE: Primary | ICD-10-CM

## 2024-11-18 DIAGNOSIS — R93.89 ABNORMAL ULTRASOUND: ICD-10-CM

## 2024-11-18 LAB — POCT GLUCOSE: 128 MG/DL (ref 70–110)

## 2024-11-18 PROCEDURE — 99283 EMERGENCY DEPT VISIT LOW MDM: CPT

## 2024-11-18 NOTE — ED PROVIDER NOTES
Encounter Date: 11/18/2024    SCRIBE #1 NOTE: I, Arlene Meyer, am scribing for, and in the presence of,  Travis Thomason MD. I have scribed the following portions of the note - Other sections scribed: HPI, ROS, PE.       History     Chief Complaint   Patient presents with    Loss of Consciousness     45 yo fem to ED after having a syncopal episode while in MRI for outpatient exam. Staff was trying to start an IV on her when she passed out. No head trauma. Pt still experiencing some nausea, diaphoretic, and weakness. Going to ED for further evaluation.     44 y.o. female, with a PMHx of breast cancer with bilateral mastectomy, who presents to the ED with syncopal episode PTA. Patient reports she was in a scheduled MRI with contrast, stating they were having trouble with her IV, causing her to become diaphoretic and lose consciousness. States this has happened before and is triggered by the IV. Denies any complaints at this time and states she feels back to her baseline. No other exacerbating or alleviating factors.     The history is provided by the patient. No  was used.     Review of patient's allergies indicates:   Allergen Reactions    Ciprofloxacin Hives, Shortness Of Breath and Itching    Cleocin [clindamycin hcl] Hives and Swelling    Coconut Swelling and Rash    Dye Swelling and Blisters     Hair Dye    Strawberry Swelling and Rash    Bacitracin Rash     Past Medical History:   Diagnosis Date    Anticoagulant long-term use     DCIS (ductal carcinoma in situ)      Past Surgical History:   Procedure Laterality Date    breast augmentation      BREAST BIOPSY Right     BREAST CAPSULOTOMY Left 07/05/2022    Procedure: CAPSULOTOMY, BREAST;  Surgeon: Haresh Porter MD;  Location: [facility] OR [address] [place] [address];  Service: Plastics;  Laterality: Left;    BREAST RECONSTRUCTION Bilateral     Flap reconstruction 5/2022    CHOLECYSTECTOMY      MASTECTOMY Bilateral 10/12/2020    Procedure:  MASTECTOMY-Bilateral ;  Surgeon: Anahi Huitron MD;  Location: [address];  Service: General;  Laterality: Bilateral;    REPLACEMENT OF IMPLANT OF BREAST Bilateral 10/12/2020    Procedure: REPLACEMENT, IMPLANT, BREAST-Bilateral;  Surgeon: Haresh Porter MD;  Location: [facility] OR [address] [place] [address];  Service: Plastics;  Laterality: Bilateral;    REPLACEMENT OF IMPLANT OF BREAST Left 2020    Procedure: Left Breast Implant Exchange;  Surgeon: Haresh Porter MD;  Location: [facility] OR [address] [place] [address];  Service: Plastics;  Laterality: Left;    REVISION OF BREAST IMPLANT Left 2022    Procedure: REVISION OF PROCEDURE INVOLVING BREAST IMPLANT;  Surgeon: Haresh Porter MD;  Location: [facility] OR [address] [place] [address];  Service: Plastics;  Laterality: Left;  lewis breast    SENTINEL LYMPH NODE BIOPSY Bilateral 10/12/2020    Procedure: BIOPSY, LYMPH NODE, SENTINEL-Bilateral;  Surgeon: Anahi Huitron MD;  Location: [address];  Service: General;  Laterality: Bilateral;     Family History   Problem Relation Name Age of Onset    Hypertension Mother      Heart disease Father       Social History     Tobacco Use    Smoking status: Former     Current packs/day: 0.00     Types: Cigarettes     Quit date: 2022     Years since quittin.2    Smokeless tobacco: Never   Substance Use Topics    Alcohol use: No    Drug use: No     Review of Systems   Constitutional:  Negative for fever.   HENT:  Negative for sore throat.    Respiratory:  Negative for cough and shortness of breath.    Cardiovascular:  Negative for chest pain and leg swelling.   Gastrointestinal:  Negative for abdominal pain, diarrhea, nausea and vomiting.   Genitourinary:  Negative for dysuria and hematuria.   Musculoskeletal:  Negative for back pain and neck pain.   Skin:  Negative for rash.   Neurological:  Positive for syncope.       Physical Exam     Initial Vitals   BP Pulse Resp Temp SpO2   24 1547 24 1547 24 1554 24 1700 24 1547    112/61 86 18 98 °F (36.7 °C) 97 %      MAP       --                Physical Exam    Nursing note and vitals reviewed.  Constitutional: She appears well-developed and well-nourished.   HENT:   Head: Normocephalic.   Eyes: EOM are normal. Pupils are equal, round, and reactive to light.   Neck: Neck supple. No thyromegaly present. No JVD present.   Normal range of motion.  Cardiovascular:  Normal rate, regular rhythm, normal heart sounds and intact distal pulses.     Exam reveals no gallop and no friction rub.       No murmur heard.  Pulmonary/Chest: Breath sounds normal. No respiratory distress.   Abdominal: Abdomen is soft. Bowel sounds are normal.   Musculoskeletal:         General: No tenderness or edema. Normal range of motion.      Cervical back: Normal range of motion and neck supple.     Neurological: She is alert and oriented to person, place, and time. She has normal strength.   Skin: Skin is warm and dry.         ED Course   Procedures  Labs Reviewed - No data to display  EKG Readings: (Independently Interpreted)   Initial Reading: No STEMI. Rhythm: Normal Sinus Rhythm. Heart Rate: 82. Ectopy: No Ectopy. Conduction: Normal. ST Segments: Normal ST Segments.     ECG Results              EKG 12-lead (Final result)  Result time 11/19/24 10:49:31      Final result by Unknown User (11/19/24 10:49:31)                                      Imaging Results    None          Medications - No data to display  Medical Decision Making  Patient had a classic vasovagal syncopal event.  Now feels recovered.  Asymptomatic.  Normal neurologic exam.  Normal heart and lung exam.  Patient was not feel she needs any further intervention.  Offered to place a ultrasound IV and perform MRI that was scheduled for today.  Patient declined.  States wants to reschedule.  I do not feel any further workup is warranted.  Patient agrees.  Stable for discharge.        Scribe Attestation:   Scribe #1: I performed the above scribed service and  the documentation accurately describes the services I performed. I attest to the accuracy of the note.                               Clinical Impression:  Final diagnoses:  [R55] Vasovagal near syncope (Primary)          ED Disposition Condition    Discharge Stable          ED Prescriptions    None       Follow-up Information       Follow up With Specialties Details Why Contact Info    Nava Jenkins, DO Family Medicine   [address] [facility] [place] [address]  [phone]            I, Travis Thomason, personally performed the services described in this documentation. All medical record entries made by the scribe were at my direction and in my presence. I have reviewed the chart and agree that the record reflects my personal performance and is accurate and complete.      DISCLAIMER: This note was prepared with [employer] voice recognition transcription software. Garbled syntax, mangled pronouns, and other bizarre constructions may be attributed to that software system.       Travis Thomason MD  11/30/24 [id number]

## 2024-11-20 LAB
OHS QRS DURATION: 86 MS
OHS QTC CALCULATION: 448 MS

## 2025-01-07 ENCOUNTER — OFFICE VISIT (OUTPATIENT)
Dept: FAMILY MEDICINE | Facility: CLINIC | Age: 45
End: 2025-01-07
Payer: COMMERCIAL

## 2025-01-07 VITALS
TEMPERATURE: 98 F | WEIGHT: 151.56 LBS | OXYGEN SATURATION: 96 % | DIASTOLIC BLOOD PRESSURE: 68 MMHG | SYSTOLIC BLOOD PRESSURE: 118 MMHG | HEIGHT: 67 IN | HEART RATE: 95 BPM | BODY MASS INDEX: 23.79 KG/M2

## 2025-01-07 DIAGNOSIS — D73.4 SPLENIC CYST: ICD-10-CM

## 2025-01-07 DIAGNOSIS — Z12.11 SCREENING FOR COLON CANCER: ICD-10-CM

## 2025-01-07 DIAGNOSIS — Z00.00 ANNUAL PHYSICAL EXAM: Primary | ICD-10-CM

## 2025-01-07 DIAGNOSIS — N28.1 RENAL CYST, RIGHT: ICD-10-CM

## 2025-01-07 DIAGNOSIS — K76.89 HEPATIC CYST: ICD-10-CM

## 2025-01-07 PROBLEM — C50.919 BREAST CANCER: Status: RESOLVED | Noted: 2020-10-26 | Resolved: 2025-01-07

## 2025-01-07 PROCEDURE — 1160F RVW MEDS BY RX/DR IN RCRD: CPT | Mod: CPTII,S$GLB,, | Performed by: FAMILY MEDICINE

## 2025-01-07 PROCEDURE — 3008F BODY MASS INDEX DOCD: CPT | Mod: CPTII,S$GLB,, | Performed by: FAMILY MEDICINE

## 2025-01-07 PROCEDURE — 3074F SYST BP LT 130 MM HG: CPT | Mod: CPTII,S$GLB,, | Performed by: FAMILY MEDICINE

## 2025-01-07 PROCEDURE — 99396 PREV VISIT EST AGE 40-64: CPT | Mod: S$GLB,,, | Performed by: FAMILY MEDICINE

## 2025-01-07 PROCEDURE — 99999 PR PBB SHADOW E&M-EST. PATIENT-LVL IV: CPT | Mod: PBBFAC,,, | Performed by: FAMILY MEDICINE

## 2025-01-07 PROCEDURE — 1159F MED LIST DOCD IN RCRD: CPT | Mod: CPTII,S$GLB,, | Performed by: FAMILY MEDICINE

## 2025-01-07 PROCEDURE — 3078F DIAST BP <80 MM HG: CPT | Mod: CPTII,S$GLB,, | Performed by: FAMILY MEDICINE

## 2025-01-07 NOTE — PROGRESS NOTES
Assessment & Plan:    Annual physical exam  -     CBC Auto Differential; Future; Expected date: 01/07/2025  -     Comprehensive Metabolic Panel; Future; Expected date: 01/07/2025  -     Hemoglobin A1C; Future; Expected date: 01/07/2025  -     Lipid Panel; Future; Expected date: 01/07/2025    Fasting labs to be scheduled.    Hepatic cyst  Splenic cyst  Renal cyst, right  US result reviewed with patient. Number provided to schedule MRI.    Screening for colon cancer  -     Ambulatory referral/consult to Endo Procedure ; Future; Expected date: 01/08/2025      Declines vaccines.     Follow-up: Follow up in about 1 year (around 1/7/2026).  ______________________________________________________________________    Chief Complaint  Chief Complaint   Patient presents with    Annual Exam       HPI  Alton Bach is a 44 y.o. female with medical diagnoses as listed in the medical history and problem list that presents to the office for an annual exam. She went to the ER on 11/18 after a vasovagal reaction during an attempted IV before her MRI abdomen to evaluate for splenic, hepatic, and right renal cysts found on an US. Patient states that she has had this happen before if an IV is missed the first time it is attempted. However, she generally feels well today and does not have any new concerns. She tried doing Cologuard but could not do it so she would like to do a colonoscopy instead.     Health Maintenance         Date Due Completion Date    COVID-19 Vaccine (1) Never done ---    TETANUS VACCINE Never done ---    Pneumococcal Vaccines (Age 0-49) (1 of 2 - PCV) Never done ---    Colorectal Cancer Screening Never done ---    Cervical Cancer Screening 02/28/2029 2/28/2024    Lipid Panel 03/06/2029 3/6/2024    RSV Vaccine (Age 60+ and Pregnant patients) (1 - 1-dose 75+ series) 10/19/2053 ---              PAST MEDICAL HISTORY:  Past Medical History:   Diagnosis Date    Anticoagulant long-term use     DCIS  (ductal carcinoma in situ)        PAST SURGICAL HISTORY:  Past Surgical History:   Procedure Laterality Date    breast augmentation      BREAST BIOPSY Right     BREAST CAPSULOTOMY Left 2022    Procedure: CAPSULOTOMY, BREAST;  Surgeon: Haresh Porter MD;  Location: [address];  Service: Plastics;  Laterality: Left;    BREAST RECONSTRUCTION Bilateral     Flap reconstruction 2022    CHOLECYSTECTOMY      MASTECTOMY Bilateral 10/12/2020    Procedure: MASTECTOMY-Bilateral ;  Surgeon: Anahi Huitron MD;  Location: [address];  Service: General;  Laterality: Bilateral;    REPLACEMENT OF IMPLANT OF BREAST Bilateral 10/12/2020    Procedure: REPLACEMENT, IMPLANT, BREAST-Bilateral;  Surgeon: Haresh Porter MD;  Location: [facility] OR [address] [place] [address];  Service: Plastics;  Laterality: Bilateral;    REPLACEMENT OF IMPLANT OF BREAST Left 2020    Procedure: Left Breast Implant Exchange;  Surgeon: Haresh Porter MD;  Location: [facility] OR [address] [place] [address];  Service: Plastics;  Laterality: Left;    REVISION OF BREAST IMPLANT Left 2022    Procedure: REVISION OF PROCEDURE INVOLVING BREAST IMPLANT;  Surgeon: Haresh Porter MD;  Location: [facility] OR [address] [place] [address];  Service: Plastics;  Laterality: Left;  lewis breast    SENTINEL LYMPH NODE BIOPSY Bilateral 10/12/2020    Procedure: BIOPSY, LYMPH NODE, SENTINEL-Bilateral;  Surgeon: Anahi Huitron MD;  Location: [address];  Service: General;  Laterality: Bilateral;       SOCIAL HISTORY:  Social History     Socioeconomic History    Marital status: Single   Tobacco Use    Smoking status: Former     Current packs/day: 0.00     Types: Cigarettes     Quit date: 2022     Years since quittin.3    Smokeless tobacco: Never   Substance and Sexual Activity    Alcohol use: No    Drug use: No       FAMILY HISTORY:  Family History   Problem Relation Name Age of Onset    Hypertension Mother      Heart disease Father         ALLERGIES AND MEDICATIONS: updated and  "reviewed.  Review of patient's allergies indicates:   Allergen Reactions    Ciprofloxacin Hives, Shortness Of Breath and Itching    Cleocin [clindamycin hcl] Hives and Swelling    Coconut Swelling and Rash    Dye Swelling and Blisters     Hair Dye    Strawberry Swelling and Rash    Bacitracin Rash     No current outpatient medications on file.     No current facility-administered medications for this visit.     Facility-Administered Medications Ordered in Other Visits   Medication Dose Route Frequency Provider Last Rate Last Admin    heparin (porcine) injection 5,000 Units  5,000 Units Subcutaneous Once Karen Garcia MD        sodium chloride 0.9% flush 10 mL  10 mL Intravenous PRN Karen Garcia MD             ROS  Review of Systems   Neurological:  Positive for syncope.           Physical Exam  Vitals:    01/07/25 1644   BP: 118/68   Pulse: 95   Temp: 98 °F (36.7 °C)   TempSrc: Oral   SpO2: 96%   Weight: 68.8 kg (151 lb 9.1 oz)   Height: 5' 7" (1.702 m)    Body mass index is 23.74 kg/m².  Weight: 68.8 kg (151 lb 9.1 oz)   Height: 5' 7" (170.2 cm)   Physical Exam  Constitutional:       General: She is not in acute distress.     Appearance: Normal appearance.   HENT:      Head: Normocephalic and atraumatic.   Neck:      Thyroid: No thyromegaly.      Vascular: No carotid bruit.   Cardiovascular:      Rate and Rhythm: Normal rate and regular rhythm.      Pulses: Normal pulses.      Heart sounds: Normal heart sounds.   Pulmonary:      Effort: Pulmonary effort is normal. No respiratory distress.      Breath sounds: Normal breath sounds.   Musculoskeletal:      Cervical back: Neck supple.      Right lower leg: No edema.      Left lower leg: No edema.   Lymphadenopathy:      Cervical: No cervical adenopathy.   Skin:     General: Skin is warm and dry.      Findings: No rash.   Neurological:      General: No focal deficit present.      Mental Status: She is alert and oriented to person, place, and time.   Psychiatric:    "      Mood and Affect: Mood normal.         Behavior: Behavior normal.         Thought Content: Thought content normal.

## 2025-01-11 ENCOUNTER — TELEPHONE (OUTPATIENT)
Dept: ENDOSCOPY | Facility: HOSPITAL | Age: 45
End: 2025-01-11

## 2025-01-11 ENCOUNTER — CLINICAL SUPPORT (OUTPATIENT)
Dept: ENDOSCOPY | Facility: HOSPITAL | Age: 45
End: 2025-01-11
Attending: FAMILY MEDICINE
Payer: COMMERCIAL

## 2025-01-11 VITALS — BODY MASS INDEX: 23.07 KG/M2 | HEIGHT: 67 IN | WEIGHT: 147 LBS

## 2025-01-11 DIAGNOSIS — Z12.11 SCREENING FOR COLON CANCER: ICD-10-CM

## 2025-01-11 RX ORDER — SODIUM, POTASSIUM,MAG SULFATES 17.5-3.13G
1 SOLUTION, RECONSTITUTED, ORAL ORAL DAILY
Qty: 1 KIT | Refills: 0 | Status: SHIPPED | OUTPATIENT
Start: 2025-01-11 | End: 2025-01-13

## 2025-01-11 NOTE — TELEPHONE ENCOUNTER
Referral for procedure from [facility] appointment      Spoke to Alton to schedule procedure(s) Colonoscopy       Physician to perform procedure(s) Dr. PABLO Parsons  Date of Procedure (s) 2/13/25  Arrival Time 9:00 AM  Time of Procedure(s) 10:00 AM   Location of Procedure(s) [facility]  Type of Rx Prep sent to patient: Suprep  Instructions provided to patient via [facility]    Patient was informed on the following information and verbalized understanding. Screening questionnaire reviewed with patient and complete. If procedure requires anesthesia, a responsible adult needs to be present to accompany the patient home, patient cannot drive after receiving anesthesia. Appointment details are tentative, especially check-in time. Patient will receive a prep-op call 7 days prior to confirm check-in time for procedure. If applicable the patient should contact their pharmacy to verify Rx for procedure prep is ready for pick-up. Patient was advised to call the scheduling department at [phone] if pharmacy states no Rx is available. Patient was advised to call the endoscopy scheduling department if any questions or concerns arise.       Endoscopy Scheduling Department

## 2025-01-15 ENCOUNTER — PATIENT MESSAGE (OUTPATIENT)
Dept: FAMILY MEDICINE | Facility: CLINIC | Age: 45
End: 2025-01-15
Payer: COMMERCIAL

## 2025-01-15 ENCOUNTER — LAB VISIT (OUTPATIENT)
Dept: LAB | Facility: HOSPITAL | Age: 45
End: 2025-01-15
Attending: FAMILY MEDICINE
Payer: COMMERCIAL

## 2025-01-15 DIAGNOSIS — Z00.00 ANNUAL PHYSICAL EXAM: ICD-10-CM

## 2025-01-15 DIAGNOSIS — Z00.00 ANNUAL PHYSICAL EXAM: Primary | ICD-10-CM

## 2025-01-15 LAB
ALBUMIN SERPL BCP-MCNC: 3.9 G/DL (ref 3.5–5.2)
ALP SERPL-CCNC: 60 U/L (ref 40–150)
ALT SERPL W/O P-5'-P-CCNC: 17 U/L (ref 10–44)
ANION GAP SERPL CALC-SCNC: 5 MMOL/L (ref 8–16)
AST SERPL-CCNC: 12 U/L (ref 10–40)
BASOPHILS # BLD AUTO: 0.04 K/UL (ref 0–0.2)
BASOPHILS NFR BLD: 0.5 % (ref 0–1.9)
BILIRUB SERPL-MCNC: 0.4 MG/DL (ref 0.1–1)
BUN SERPL-MCNC: 7 MG/DL (ref 6–20)
CALCIUM SERPL-MCNC: 9.2 MG/DL (ref 8.7–10.5)
CHLORIDE SERPL-SCNC: 107 MMOL/L (ref 95–110)
CHOLEST SERPL-MCNC: 156 MG/DL (ref 120–199)
CHOLEST/HDLC SERPL: 4.9 {RATIO} (ref 2–5)
CO2 SERPL-SCNC: 26 MMOL/L (ref 23–29)
CREAT SERPL-MCNC: 0.6 MG/DL (ref 0.5–1.4)
DIFFERENTIAL METHOD BLD: ABNORMAL
EOSINOPHIL # BLD AUTO: 0.1 K/UL (ref 0–0.5)
EOSINOPHIL NFR BLD: 1.6 % (ref 0–8)
ERYTHROCYTE [DISTWIDTH] IN BLOOD BY AUTOMATED COUNT: 13.2 % (ref 11.5–14.5)
EST. GFR  (NO RACE VARIABLE): >60 ML/MIN/1.73 M^2
ESTIMATED AVG GLUCOSE: 108 MG/DL (ref 68–131)
GLUCOSE SERPL-MCNC: 95 MG/DL (ref 70–110)
HBA1C MFR BLD: 5.4 % (ref 4–5.6)
HCT VFR BLD AUTO: 42.7 % (ref 37–48.5)
HDLC SERPL-MCNC: 32 MG/DL (ref 40–75)
HDLC SERPL: 20.5 % (ref 20–50)
HGB BLD-MCNC: 13.4 G/DL (ref 12–16)
IMM GRANULOCYTES # BLD AUTO: 0.03 K/UL (ref 0–0.04)
IMM GRANULOCYTES NFR BLD AUTO: 0.3 % (ref 0–0.5)
LDLC SERPL CALC-MCNC: 93.4 MG/DL (ref 63–159)
LYMPHOCYTES # BLD AUTO: 3.4 K/UL (ref 1–4.8)
LYMPHOCYTES NFR BLD: 38.1 % (ref 18–48)
MCH RBC QN AUTO: 29.8 PG (ref 27–31)
MCHC RBC AUTO-ENTMCNC: 31.4 G/DL (ref 32–36)
MCV RBC AUTO: 95 FL (ref 82–98)
MONOCYTES # BLD AUTO: 0.7 K/UL (ref 0.3–1)
MONOCYTES NFR BLD: 7.8 % (ref 4–15)
NEUTROPHILS # BLD AUTO: 4.6 K/UL (ref 1.8–7.7)
NEUTROPHILS NFR BLD: 51.7 % (ref 38–73)
NONHDLC SERPL-MCNC: 124 MG/DL
NRBC BLD-RTO: 0 /100 WBC
PLATELET # BLD AUTO: 327 K/UL (ref 150–450)
PMV BLD AUTO: 11.3 FL (ref 9.2–12.9)
POTASSIUM SERPL-SCNC: 4.1 MMOL/L (ref 3.5–5.1)
PROT SERPL-MCNC: 6.9 G/DL (ref 6–8.4)
RBC # BLD AUTO: 4.5 M/UL (ref 4–5.4)
SODIUM SERPL-SCNC: 138 MMOL/L (ref 136–145)
TRIGL SERPL-MCNC: 153 MG/DL (ref 30–150)
WBC # BLD AUTO: 8.8 K/UL (ref 3.9–12.7)

## 2025-01-15 PROCEDURE — 85025 COMPLETE CBC W/AUTO DIFF WBC: CPT | Performed by: FAMILY MEDICINE

## 2025-01-15 PROCEDURE — 83036 HEMOGLOBIN GLYCOSYLATED A1C: CPT | Performed by: FAMILY MEDICINE

## 2025-01-15 PROCEDURE — 80053 COMPREHEN METABOLIC PANEL: CPT | Performed by: FAMILY MEDICINE

## 2025-01-15 PROCEDURE — 80061 LIPID PANEL: CPT | Performed by: FAMILY MEDICINE

## 2025-01-15 PROCEDURE — 36415 COLL VENOUS BLD VENIPUNCTURE: CPT | Mod: PO | Performed by: FAMILY MEDICINE

## 2025-01-16 NOTE — TELEPHONE ENCOUNTER
Patient was informed of result(s) via [facility]. Please contact them to schedule fasting labs before her next annual.

## 2025-02-01 ENCOUNTER — HOSPITAL ENCOUNTER (OUTPATIENT)
Dept: RADIOLOGY | Facility: HOSPITAL | Age: 45
Discharge: HOME | End: 2025-02-01
Payer: COMMERCIAL

## 2025-02-01 PROCEDURE — A9585 GADOBUTROL INJECTION: HCPCS

## 2025-02-01 PROCEDURE — 25500020 PHARM REV CODE 255

## 2025-02-01 PROCEDURE — 74183 MRI ABD W/O CNTR FLWD CNTR: CPT | Mod: TC

## 2025-02-01 PROCEDURE — 74183 MRI ABD W/O CNTR FLWD CNTR: CPT | Mod: 26,,, | Performed by: RADIOLOGY

## 2025-02-01 RX ORDER — GADOBUTROL 604.72 MG/ML
10 INJECTION INTRAVENOUS
Status: COMPLETED | OUTPATIENT
Start: 2025-02-01 | End: 2025-02-01

## 2025-02-01 RX ADMIN — GADOBUTROL 10 ML: 604.72 INJECTION INTRAVENOUS at 09:02

## 2025-02-03 DIAGNOSIS — D18.03 HEPATIC HEMANGIOMA: Primary | ICD-10-CM

## 2025-02-03 DIAGNOSIS — D73.4 SPLENIC CYST: ICD-10-CM

## 2025-02-07 ENCOUNTER — TELEPHONE (OUTPATIENT)
Dept: ENDOSCOPY | Facility: HOSPITAL | Age: 45
End: 2025-02-07
Payer: COMMERCIAL

## 2025-02-07 NOTE — TELEPHONE ENCOUNTER
Received message from Endoscopy Lab pt needs to reschedule colonoscopy currently scheduled 2/13/25 due to work obligation.  Spoke with pt and procedure rescheduled as below:    Referral for procedure from PAT appointment originally 1/11/25.      Spoke to pt to schedule procedure(s) Colonoscopy       Physician to perform procedure(s) Dr. ASHLEY Taylor  Date of Procedure (s) 3/24/25  Arrival Time 1:10 PM  Time of Procedure(s) 2:10 PM   Location of Procedure(s) [address]  Type of Rx Prep sent to patient: Suprep (pt already has)  Instructions provided to patient via [facility]    Patient was informed on the following information and verbalized understanding. Screening questionnaire reviewed with patient and complete. If procedure requires anesthesia, a responsible adult needs to be present to accompany the patient home, patient cannot drive after receiving anesthesia. Appointment details are tentative, especially check-in time. Patient will receive a prep-op call 7 days prior to confirm check-in time for procedure. If applicable the patient should contact their pharmacy to verify Rx for procedure prep is ready for pick-up. Patient was advised to call the scheduling department at [phone] if pharmacy states no Rx is available. Patient was advised to call the endoscopy scheduling department if any questions or concerns arise.       Endoscopy Scheduling Department

## 2025-03-19 ENCOUNTER — TELEPHONE (OUTPATIENT)
Dept: HEPATOLOGY | Facility: CLINIC | Age: 45
End: 2025-03-19

## 2025-03-19 ENCOUNTER — OFFICE VISIT (OUTPATIENT)
Dept: HEPATOLOGY | Facility: CLINIC | Age: 45
End: 2025-03-19
Payer: COMMERCIAL

## 2025-03-19 VITALS
BODY MASS INDEX: 23.94 KG/M2 | RESPIRATION RATE: 18 BRPM | OXYGEN SATURATION: 100 % | HEART RATE: 94 BPM | WEIGHT: 152.56 LBS | DIASTOLIC BLOOD PRESSURE: 79 MMHG | SYSTOLIC BLOOD PRESSURE: 139 MMHG | HEIGHT: 67 IN

## 2025-03-19 DIAGNOSIS — D18.03 HEPATIC HEMANGIOMA: ICD-10-CM

## 2025-03-19 DIAGNOSIS — D05.12 DUCTAL CARCINOMA IN SITU (DCIS) OF LEFT BREAST: ICD-10-CM

## 2025-03-19 DIAGNOSIS — D73.4 SPLENIC CYST: ICD-10-CM

## 2025-03-19 DIAGNOSIS — R93.2 ABNORMAL FINDING ON IMAGING OF LIVER: ICD-10-CM

## 2025-03-19 PROCEDURE — 99999 PR PBB SHADOW E&M-EST. PATIENT-LVL III: CPT | Mod: PBBFAC,,, | Performed by: INTERNAL MEDICINE

## 2025-03-19 NOTE — TELEPHONE ENCOUNTER
"Patient: Alton Bach       MRN: [id number]      : 1980     Age: 44 y.o.  [address] [facility] [address]    Presenting Radiologists:     Providers: Traci Taylor MD    Priority of review: Benign disease    Patient Transplant Status: Hepatology    Reason for presentation: Reassessment    Clinical Summary: 44 y.o. female history of breast cancer, who was referred to Hepatology Clinic for liver and splenic lesions.     Imaging to be reviewed: MRI abd w/wo contrast 25    HCC Treatment History: None    Platelets:   Lab Results   Component Value Date/Time     01/15/2025 07:00 AM     Creatinine:   Lab Results   Component Value Date/Time    CREATININE 0.6 01/15/2025 07:00 AM     Bilirubin:   Lab Results   Component Value Date/Time    BILITOT 0.4 01/15/2025 07:00 AM     AFP Last 3 each if available: No results found for: "AFP", "EXTAFP"    MELD: Computed MELD 3.0 unavailable. One or more values for this score either were not found within the given timeframe or did not fit some other criterion.  Computed MELD-Na unavailable. One or more values for this score either were not found within the given timeframe or did not fit some other criterion.      Plan:     Follow-up Provider:   "

## 2025-03-19 NOTE — PROGRESS NOTES
Hepatology Consult Note    Referring provider: Dr. Nava Jenkins  PCP: Nava Jenkins DO    Chief complaint: hepatic hemangioma, splenic cyst    HPI:  Alton Bach is a 44 y.o. female who was referred to Hepatology Clinic for hepatic hemangioma, splenic cyst. Incidentally found to have liver and spleen lesions after having an ultrasound for abdominal / pelvic pain.    Regarding risk factors for liver disease, the patient denies prior history of EtOH abuse, illicit drug use, blood transfusions. 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 Denies history of DM, HTN, HLD, CIRILO. Denies family history of liver disease or liver cancer.     Reports breast cancer diagnosed in 2020 s/p bilateral mastectomy. No chemotherapy or radiation. Currently in remission.     Denies family history of cancers.    Past Medical History:   Diagnosis Date    Anticoagulant long-term use     DCIS (ductal carcinoma in situ)        Past Surgical History:   Procedure Laterality Date    breast augmentation      BREAST BIOPSY Right     BREAST CAPSULOTOMY Left 07/05/2022    Procedure: CAPSULOTOMY, BREAST;  Surgeon: Haresh Porter MD;  Location: [facility] OR [address] [place] [address];  Service: Plastics;  Laterality: Left;    BREAST RECONSTRUCTION Bilateral     Flap reconstruction 5/2022    CHOLECYSTECTOMY      MASTECTOMY Bilateral 10/12/2020    Procedure: MASTECTOMY-Bilateral ;  Surgeon: Anahi Huitron MD;  Location: [facility] OR [address] [place] [address];  Service: General;  Laterality: Bilateral;    REPLACEMENT OF IMPLANT OF BREAST Bilateral 10/12/2020    Procedure: REPLACEMENT, IMPLANT, BREAST-Bilateral;  Surgeon: Haresh Porter MD;  Location: [address];  Service: Plastics;  Laterality: Bilateral;    REPLACEMENT OF IMPLANT OF BREAST Left 11/04/2020    Procedure: Left Breast Implant Exchange;  Surgeon: Haresh Porter MD;  Location: [address];  Service: Plastics;  Laterality: Left;    REVISION OF BREAST IMPLANT Left 07/05/2022     "Procedure: REVISION OF PROCEDURE INVOLVING BREAST IMPLANT;  Surgeon: Haresh Porter MD;  Location: [facility] OR [address] [place] [address];  Service: Plastics;  Laterality: Left;  lewis breast    SENTINEL LYMPH NODE BIOPSY Bilateral 10/12/2020    Procedure: BIOPSY, LYMPH NODE, SENTINEL-Bilateral;  Surgeon: Anahi Huitron MD;  Location: [facility] OR [address] [place] [address];  Service: General;  Laterality: Bilateral;       Family History   Problem Relation Name Age of Onset    Hypertension Mother      Heart disease Father         Social History[1]    Current Medications[2]    Review of patient's allergies indicates:   Allergen Reactions    Ciprofloxacin Hives, Shortness Of Breath and Itching    Cleocin [clindamycin hcl] Hives and Swelling    Coconut Swelling and Rash    Dye Swelling and Blisters     Hair Dye    Strawberry Swelling and Rash    Bacitracin Rash            Vitals:    03/19/25 1527   BP: 139/79   Pulse: 94   Resp: 18   SpO2: 100%   Weight: 69.2 kg (152 lb 8.9 oz)   Height: 5' 7" (1.702 m)   Body mass index is 23.89 kg/m².    Physical Exam  Constitutional:       General: She is not in acute distress.  Eyes:      General: No scleral icterus.  Cardiovascular:      Rate and Rhythm: Normal rate.   Pulmonary:      Effort: Pulmonary effort is normal.   Abdominal:      General: Abdomen is flat. There is no distension.      Palpations: Abdomen is soft. There is no fluid wave, hepatomegaly or splenomegaly.      Tenderness: There is no abdominal tenderness.   Musculoskeletal:      Right lower leg: No edema.      Left lower leg: No edema.   Skin:     General: Skin is warm.      Comments: No spider angiomas. No palmar erythema. No leukonychia.    Neurological:      General: No focal deficit present.      Mental Status: She is alert and oriented to person, place, and time.   Psychiatric:         Behavior: Behavior is cooperative.         Thought Content: Thought content normal.         LABS: I personally reviewed pertinent laboratory findings.    Lab Results " "  Component Value Date    WBC 8.80 01/15/2025    HGB 13.4 01/15/2025    HCT 42.7 01/15/2025    MCV 95 01/15/2025     01/15/2025       Lab Results   Component Value Date     01/15/2025    K 4.1 01/15/2025     01/15/2025    CO2 26 01/15/2025    BUN 7 01/15/2025    CREATININE 0.6 01/15/2025    CALCIUM 9.2 01/15/2025    ANIONGAP 5 (L) 01/15/2025    ESTGFRAFRICA >60.0 06/22/2022    EGFRNONAA >60.0 06/22/2022       Lab Results   Component Value Date    ALT 17 01/15/2025    AST 12 01/15/2025    ALKPHOS 60 01/15/2025    BILITOT 0.4 01/15/2025       Lab Results   Component Value Date    HEPCAB Negative 12/03/2021    HEPCAB Negative 12/03/2021       No results found for: "LATIA", "MITOAB", "SMOOTHMUSCAB", "IGG", "CERULOPLSM"     Computed MELD 3.0 unavailable. One or more values for this score either were not found within the given timeframe or did not fit some other criterion.  Computed MELD-Na unavailable. One or more values for this score either were not found within the given timeframe or did not fit some other criterion.       IMAGING: I personally reviewed imaging studies.      Assessment:  Alton Bach is a 44 y.o. female who was referred to Hepatology Clinic for hepatic hemangioma, splenic cyst. Per chart review, liver enzymes and bilirubin have been within normal limits dating back to 2020 in our system. Platelet count is normal. MRI abd w/wo contrast 2/1/2025 demonstrates no global hepatic signal abnormality. Hemangioma within hepatic segment 3 measuring 2.1 x 1.3 cm, additional scattered hepatic cysts, largest within hepatic segment 8, and splenic cyst measuring 1.1 cm.     Discussed the benign nature of hepatic hemangiomas, splenic cyst. Will review imaging in IR conference out of an abundance of caution given history of breast cancer.     1. Hepatic hemangioma    2. Splenic cyst    3. Abnormal finding on imaging of liver    4. Ductal carcinoma in situ (DCIS) of left breast  "     Recommendations:  - Review imaging in IR conference   - No surveillance warranted for hemangiomas, cysts    Return to clinic as needed.    I have sent communication to the referring physician and/or primary care provider.    Traci Taylor MD  Staff Physician  Hepatology and Liver Transplant  [facility]  [facility] [place]           [1]   Social History  Tobacco Use    Smoking status: Former     Current packs/day: 0.00     Types: Cigarettes     Quit date: 2022     Years since quittin.5    Smokeless tobacco: Never   Substance Use Topics    Alcohol use: No    Drug use: No   [2]   No current outpatient medications on file.     No current facility-administered medications for this visit.     Facility-Administered Medications Ordered in Other Visits   Medication Dose Route Frequency Provider Last Rate Last Admin    heparin (porcine) injection 5,000 Units  5,000 Units Subcutaneous Once Karen Garcia MD        sodium chloride 0.9% flush 10 mL  10 mL Intravenous PRN Karen Garcia MD

## 2025-03-24 ENCOUNTER — CONFERENCE (OUTPATIENT)
Dept: TRANSPLANT | Facility: CLINIC | Age: 45
End: 2025-03-24
Payer: COMMERCIAL

## 2025-03-24 NOTE — TELEPHONE ENCOUNTER
"Patient: Alton Bach       MRN: [id number]      : 1980     Age: 44 y.o.  [address] [facility] [address]    Presenting Radiologists:     Providers: Traci Taylor MD    Priority of review: Benign disease    Patient Transplant Status: Hepatology    Reason for presentation: Reassessment    Clinical Summary: 44 y.o. female history of breast cancer, who was referred to Hepatology Clinic for liver and splenic lesions.     Imaging to be reviewed: MRI abd w/wo contrast 25    HCC Treatment History: None    Platelets:   Lab Results   Component Value Date/Time     01/15/2025 07:00 AM     Creatinine:   Lab Results   Component Value Date/Time    CREATININE 0.6 01/15/2025 07:00 AM     Bilirubin:   Lab Results   Component Value Date/Time    BILITOT 0.4 01/15/2025 07:00 AM     AFP Last 3 each if available: No results found for: "AFP", "EXTAFP"    MELD: Computed MELD 3.0 unavailable. One or more values for this score either were not found within the given timeframe or did not fit some other criterion.  Computed MELD-Na unavailable. One or more values for this score either were not found within the given timeframe or did not fit some other criterion.    IR discussion/Plan:  Benign lesions.  L lobe hemangioma and small hepatic and splenic cysts.     Committee Discussion/plan:  Nothing concerning. Hemangioma on anterior left lobe. Little cyst on right side. No solid mass.       Follow-up Provider: Dr Taylor  "

## 2025-03-28 ENCOUNTER — PATIENT MESSAGE (OUTPATIENT)
Dept: HEPATOLOGY | Facility: CLINIC | Age: 45
End: 2025-03-28
Payer: COMMERCIAL

## 2025-05-08 ENCOUNTER — OFFICE VISIT (OUTPATIENT)
Dept: HEMATOLOGY/ONCOLOGY | Facility: CLINIC | Age: 45
End: 2025-05-08
Payer: COMMERCIAL

## 2025-05-08 ENCOUNTER — PATIENT MESSAGE (OUTPATIENT)
Dept: HEMATOLOGY/ONCOLOGY | Facility: CLINIC | Age: 45
End: 2025-05-08

## 2025-05-08 VITALS
OXYGEN SATURATION: 100 % | BODY MASS INDEX: 23.88 KG/M2 | DIASTOLIC BLOOD PRESSURE: 71 MMHG | WEIGHT: 152.13 LBS | SYSTOLIC BLOOD PRESSURE: 148 MMHG | HEIGHT: 67 IN | RESPIRATION RATE: 18 BRPM | HEART RATE: 79 BPM | TEMPERATURE: 99 F

## 2025-05-08 DIAGNOSIS — D73.4 SPLENIC CYST: ICD-10-CM

## 2025-05-08 DIAGNOSIS — D05.12 DUCTAL CARCINOMA IN SITU (DCIS) OF LEFT BREAST: Primary | ICD-10-CM

## 2025-05-08 DIAGNOSIS — R10.9 ABDOMINAL PAIN, UNSPECIFIED ABDOMINAL LOCATION: ICD-10-CM

## 2025-05-08 DIAGNOSIS — Z12.83 SKIN CANCER SCREENING: ICD-10-CM

## 2025-05-08 DIAGNOSIS — N63.0 MASS OF BREAST, UNSPECIFIED LATERALITY: ICD-10-CM

## 2025-05-08 DIAGNOSIS — D18.03 HEPATIC HEMANGIOMA: ICD-10-CM

## 2025-05-08 DIAGNOSIS — Z86.000 HISTORY OF DUCTAL CARCINOMA IN SITU (DCIS) OF BREAST: ICD-10-CM

## 2025-05-08 PROBLEM — R29.898 WEAKNESS OF BOTH HANDS: Status: RESOLVED | Noted: 2022-09-09 | Resolved: 2025-05-08

## 2025-05-08 PROBLEM — S76.219A GROIN STRAIN: Status: RESOLVED | Noted: 2020-05-14 | Resolved: 2025-05-08

## 2025-05-08 PROCEDURE — 99417 PROLNG OP E/M EACH 15 MIN: CPT | Mod: S$GLB,,, | Performed by: INTERNAL MEDICINE

## 2025-05-08 PROCEDURE — 3078F DIAST BP <80 MM HG: CPT | Mod: CPTII,S$GLB,, | Performed by: INTERNAL MEDICINE

## 2025-05-08 PROCEDURE — 99205 OFFICE O/P NEW HI 60 MIN: CPT | Mod: S$GLB,,, | Performed by: INTERNAL MEDICINE

## 2025-05-08 PROCEDURE — 99999 PR PBB SHADOW E&M-EST. PATIENT-LVL V: CPT | Mod: PBBFAC,,, | Performed by: INTERNAL MEDICINE

## 2025-05-08 PROCEDURE — 3008F BODY MASS INDEX DOCD: CPT | Mod: CPTII,S$GLB,, | Performed by: INTERNAL MEDICINE

## 2025-05-08 PROCEDURE — 1159F MED LIST DOCD IN RCRD: CPT | Mod: CPTII,S$GLB,, | Performed by: INTERNAL MEDICINE

## 2025-05-08 PROCEDURE — 3077F SYST BP >= 140 MM HG: CPT | Mod: CPTII,S$GLB,, | Performed by: INTERNAL MEDICINE

## 2025-05-08 PROCEDURE — 1160F RVW MEDS BY RX/DR IN RCRD: CPT | Mod: CPTII,S$GLB,, | Performed by: INTERNAL MEDICINE

## 2025-05-08 PROCEDURE — 3044F HG A1C LEVEL LT 7.0%: CPT | Mod: CPTII,S$GLB,, | Performed by: INTERNAL MEDICINE

## 2025-05-08 NOTE — PROGRESS NOTES
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 female with no significant PMH. She is referred to Medical Oncology for her history of Left Breast ER+ DCIS s/p bilateral mastectomies 10/2020, followed by bilateral MARY flap reconstruction 2023.She presents today for a new mass of her left flap reconstruction.     HPI:  2020: screening mammogram: left breast mass at 12 OC  2020: Dx MMG: right breast - architectural distortion at 8 OC (CSL); 2.6 cm left breast mass at 3 OC (bx DCIS - ER: 90%, MO: 60%), left breast mass at 12 OC (benign)  10/12/2020: Bilateral total mastectomy and bilateral SNLBx with implant reconstruction; c/b necrosis (patient wore tight coband and ace bandage wrap around her chest postoperatively) requiring debridement, impalnt removal and exchange to tissue expander  PATHOLOGY: left - 2.1 cm focus of DCIS, 0 LNs = [id number], intermediate grade 2, negative margins  PATHOLOGY: right - benign with complex sclerosing lesion   2022: capsulotomy and capsulorraphy  2023:bilateral MARY flap reconstruction by Dr. Shine  10/2023: saw breast surgery for bilateral breast masses of her bilateral flap reconstruction likely 2/2 oil cysts/scar tissue - follows with Susana Segundo      Breast Imagin2023: bilateral breast ultrasound: simple cysts bilaterally with area of scarring  2024 and 10/11/2023: dx bilateral mammogram: post surgical findings, multiple oil cysts in areas of palpable abnormalities, areas of focal fat necrosis/architectural change c/w postoperative changes - BIRADS: 2     Prior imagin2024: abdomen ultrasound  performed for abdominal swelling::3.2 cm complex cystic lesion in the spleen, hepatic right renal cysts   2/1/2025: MRI abd w/ and w/o contrast: 1.1 cm splenic cyst, left 2.1 cm hepatic lobe hemangioma     Interval History:    She is overall doing well but does admit to:     (1) Left breast mass - since her flap reconstruction in 2023, she has developed subcm masses bilaterally attributed to oil cysts and scar tissue. She previously followed with breast surgery, Rylee Segundo, prior imaging performed including breast ultrasound 9/2023 showing simple cysts bilaterally; dx mammogram (6/2024) w/ multiple oil cysts and other postoperative changes, no malignancy   - first started with a pea sized nodule in inner lower quadrant of right breast (persistent unchanged since her reconstruction). More recently she has developed a dime sized lesion in lower outer quadrant of the left breast. There have been other scattered nodules/masses that have spontaneously resolved  - the left breast mass is sometimes tender but not overtly painful   - no enlargement in noted masses    (2) Left sided abdominal pain - started recently, intermittent ache that self resolves, no radiation, no pattern. No other GI sx - denies N/V, GERD, epigastric pain, changes in bowel movements, GI bleeding, etc.     Patient otherwise denies fevers, chills, night sweats, headaches, chest pain, SOB, cough, N/V, diarrhea, constipation, weight loss, rashes.     Review of Systems:  14-point review of systems was asked with the pertinent positives and/or negatives stated in HPI.     Past Medical History:   Past Medical History:   Diagnosis Date    Anticoagulant long-term use     DCIS (ductal carcinoma in situ)      Past Surgical History:   Past Surgical History:   Procedure Laterality Date    breast augmentation      BREAST BIOPSY Right     BREAST CAPSULOTOMY Left 07/05/2022    Procedure: CAPSULOTOMY, BREAST;  Surgeon: Haresh Porter MD;  Location: [facility] OR  2ND FLR;  Service: Plastics;  Laterality: Left;    BREAST RECONSTRUCTION Bilateral     Flap reconstruction 5/2022    CHOLECYSTECTOMY      MASTECTOMY Bilateral 10/12/2020    Procedure: MASTECTOMY-Bilateral ;  Surgeon: Anahi Huitron MD;  [facility];  Service: General;  Laterality: Bilateral;    REPLACEMENT OF IMPLANT OF BREAST Bilateral 10/12/2020    Procedure: REPLACEMENT, IMPLANT, BREAST-Bilateral;  Surgeon: Haresh Porter MD;  Location: [facility] OR [address] [place] [address];  Service: Plastics;  Laterality: Bilateral;    REPLACEMENT OF IMPLANT OF BREAST Left 11/04/2020    Procedure: Left Breast Implant Exchange;  Surgeon: Haresh Porter MD;  Location: [facility] OR [address] [place] [address];  Service: Plastics;  Laterality: Left;    REVISION OF BREAST IMPLANT Left 07/05/2022    Procedure: REVISION OF PROCEDURE INVOLVING BREAST IMPLANT;  Surgeon: Haresh Poretr MD;  Location: [facility] OR [address] [place] [address];  Service: Plastics;  Laterality: Left;  lewis breast    SENTINEL LYMPH NODE BIOPSY Bilateral 10/12/2020    Procedure: BIOPSY, LYMPH NODE, SENTINEL-Bilateral;  Surgeon: Anahi Huitron MD;  Location: [facility] OR [address] [place] [address];  Service: General;  Laterality: Bilateral;       Breast Cancer/Gynecology History:    Age of menarche: 10  Age of menopause: NA  Number of Pregnancies: 3 Number of Live Births: 1    Family History:   - No family hx of breast cancer    Social History:   Lives: [place], family is here, has one daughter (26)  Occupation: [employer]   Tobacco use: former, started at age [age], about 4 cigarettes/day   Alcohol use: denies   Illicit drug use: denies      Allergies:  Review of patient's allergies indicates:   Allergen Reactions    Ciprofloxacin Hives, Shortness Of Breath and Itching    Cleocin [clindamycin hcl] Hives and Swelling    Coconut Swelling and Rash    Dye Swelling and Blisters     Hair Dye    Strawberry Swelling and Rash    Bacitracin Rash       Medications:  No current outpatient medications       Objective:  "  Vitals:   Vitals:    05/08/25 1101   BP: (!) 148/71   BP Location: Left arm   Patient Position: Sitting   Pulse: 79   Resp: 18   Temp: 98.9 °F (37.2 °C)   TempSrc: Oral   SpO2: 100%   Weight: 69 kg (152 lb 1.9 oz)   Height: 5' 7" (1.702 m)     BMI: Body mass index is 23.82 kg/m².  ECOG Performance Status: 1    Physical Exam     General Appearance:    Alert, cooperative, no distress, appears stated age   Head:    Normocephalic, without obvious abnormality, atraumatic   Throat:   Lips, mucosa, and tongue normal; teeth and gums normal   Neck:   Supple, symmetrical, no adenopathy;     thyroid:  no enlargement/tenderness/nodules   Lungs:     Clear to auscultation bilaterally, respirations unlabored    Heart:    Regular rate and rhythm, S1 and S2 normal   Abdomen:     Soft, non-tender, bowel sounds active, no masses, no organomegaly   Extremities:   Extremities normal, atraumatic, no cyanosis or edema   Pulses:   2+ and symmetric all extremities   Skin:   Skin color, texture, turgor normal, no rashes or lesions   Lymph nodes:   Cervical, supraclavicular, and axillary nodes normal   Neurologic:   CNII-XII intact, normal strength, sensation     Breast Exam: s/p bilateral mastectomies with flap reconstruction and nipple tattoos.   Left breast: lower outer and upper inner quadrant - two dime sized mobile masses, upper outer quadrant area of dense scar tissue w/o palpable mass  Right breast: lower inner quadrant - pea sized mobile nodule, lower outer quadrant - dime sized mobile nodule   Masses w/o clinically suspicious features  No lymphadenopathy.     Laboratory Data:  No visits with results within 1 Week(s) from this visit.   Latest known visit with results is:   Lab Visit on 01/15/2025   Component Date Value Ref Range Status    WBC 01/15/2025 8.80  3.90 - 12.70 K/uL Final    RBC 01/15/2025 4.50  4.00 - 5.40 M/uL Final    Hemoglobin 01/15/2025 13.4  12.0 - 16.0 g/dL Final    Hematocrit 01/15/2025 42.7  37.0 - 48.5 % Final "    MCV 01/15/2025 95  82 - 98 fL Final    MCH 01/15/2025 29.8  27.0 - 31.0 pg Final    MCHC 01/15/2025 31.4 (L)  32.0 - 36.0 g/dL Final    RDW 01/15/2025 13.2  11.5 - 14.5 % Final    Platelets 01/15/2025 327  150 - 450 K/uL Final    MPV 01/15/2025 11.3  9.2 - 12.9 fL Final    Immature Granulocytes 01/15/2025 0.3  0.0 - 0.5 % Final    Gran # (ANC) 01/15/2025 4.6  1.8 - 7.7 K/uL Final    Immature Grans (Abs) 01/15/2025 0.03  0.00 - 0.04 K/uL Final    Comment: Mild elevation in immature granulocytes is non specific and   can be seen in a variety of conditions including stress response,   acute inflammation, trauma and pregnancy. Correlation with other   laboratory and clinical findings is essential.      Lymph # 01/15/2025 3.4  1.0 - 4.8 K/uL Final    Mono # 01/15/2025 0.7  0.3 - 1.0 K/uL Final    Eos # 01/15/2025 0.1  0.0 - 0.5 K/uL Final    Baso # 01/15/2025 0.04  0.00 - 0.20 K/uL Final    nRBC 01/15/2025 0  0 /100 WBC Final    Gran % 01/15/2025 51.7  38.0 - 73.0 % Final    Lymph % 01/15/2025 38.1  18.0 - 48.0 % Final    Mono % 01/15/2025 7.8  4.0 - 15.0 % Final    Eosinophil % 01/15/2025 1.6  0.0 - 8.0 % Final    Basophil % 01/15/2025 0.5  0.0 - 1.9 % Final    Differential Method 01/15/2025 Automated   Final    Sodium 01/15/2025 138  136 - 145 mmol/L Final    Potassium 01/15/2025 4.1  3.5 - 5.1 mmol/L Final    Chloride 01/15/2025 107  95 - 110 mmol/L Final    CO2 01/15/2025 26  23 - 29 mmol/L Final    Glucose 01/15/2025 95  70 - 110 mg/dL Final    BUN 01/15/2025 7  6 - 20 mg/dL Final    Creatinine 01/15/2025 0.6  0.5 - 1.4 mg/dL Final    Calcium 01/15/2025 9.2  8.7 - 10.5 mg/dL Final    Total Protein 01/15/2025 6.9  6.0 - 8.4 g/dL Final    Albumin 01/15/2025 3.9  3.5 - 5.2 g/dL Final    Total Bilirubin 01/15/2025 0.4  0.1 - 1.0 mg/dL Final    Comment: For infants and newborns, interpretation of results should be based  on gestational age, weight and in agreement with clinical  observations.    Premature Infant  recommended reference ranges:  Up to 24 hours.............<8.0 mg/dL  Up to 48 hours............<12.0 mg/dL  3-5 days..................<15.0 mg/dL  6-29 days.................<15.0 mg/dL      Alkaline Phosphatase 01/15/2025 60  40 - 150 U/L Final    AST 01/15/2025 12  10 - 40 U/L Final    ALT 01/15/2025 17  10 - 44 U/L Final    eGFR 01/15/2025 >60.0  >60 mL/min/1.73 m^2 Final    Anion Gap 01/15/2025 5 (L)  8 - 16 mmol/L Final    Hemoglobin A1C 01/15/2025 5.4  4.0 - 5.6 % Final    Comment: ADA Screening Guidelines:  5.7-6.4%  Consistent with prediabetes  >or=6.5%  Consistent with diabetes    High levels of fetal hemoglobin interfere with the HbA1C  assay. Heterozygous hemoglobin variants (HbS, HgC, etc)do  not significantly interfere with this assay.   However, presence of multiple variants may affect accuracy.      Estimated Avg Glucose 01/15/2025 108  68 - 131 mg/dL Final    Cholesterol 01/15/2025 156  120 - 199 mg/dL Final    Comment: The National Cholesterol Education Program (NCEP) has set the  following guidelines (reference ranges) for Cholesterol:  Optimal.....................<200 mg/dL  Borderline High.............200-239 mg/dL  High........................> or = 240 mg/dL      Triglycerides 01/15/2025 153 (H)  30 - 150 mg/dL Final    Comment: The National Cholesterol Education Program (NCEP) has set the  following guidelines (reference values) for triglycerides:  Normal......................<150 mg/dL  Borderline High.............150-199 mg/dL  High........................200-499 mg/dL      HDL 01/15/2025 32 (L)  40 - 75 mg/dL Final    Comment: The National Cholesterol Education Program (NCEP) has set the  following guidelines (reference values) for HDL Cholesterol:  Low...............<40 mg/dL  Optimal...........>60 mg/dL      LDL Cholesterol 01/15/2025 93.4  63.0 - 159.0 mg/dL Final    Comment: The National Cholesterol Education Program (NCEP) has set the  following guidelines (reference values) for LDL  "Cholesterol:  Optimal.......................<130 mg/dL  Borderline High...............130-159 mg/dL  High..........................160-189 mg/dL  Very High.....................>190 mg/dL      HDL/Cholesterol Ratio 01/15/2025 20.5  20.0 - 50.0 % Final    Total Cholesterol/HDL Ratio 01/15/2025 4.9  2.0 - 5.0 Final    Non-HDL Cholesterol 01/15/2025 124  mg/dL Final    Comment: Risk category and Non-HDL cholesterol goals:  Coronary heart disease (CHD)or equivalent (10-year risk of CHD >20%):  Non-HDL cholesterol goal     <130 mg/dL  Two or more CHD risk factors and 10-year risk of CHD <= 20%:  Non-HDL cholesterol goal     <160 mg/dL  0 to 1 CHD risk factor:  Non-HDL cholesterol goal     <190 mg/dL       No results for input(s): "WBC", "HGB", "HCT", "PLT" in the last 2160 hours.  No results for input(s): "CREATININE", "AST", "ALT" in the last 2160 hours.    Invalid input(s): "GFR", "BILIRUBIN TOTAL", "ALKALINE PHOSPHATASE"  No results for input(s): "IRON", "FERRITIN" in the last 2160 hours.     Imagin2023: bilateral breast ultrasound: simple cysts bilaterally with area of scarring    2024 and 10/11/2023: dx bilateral mammogram: post surgical findings, multiple oil cysts in areas of palpable abnormalities, areas of focal fat necrosis/architectural change c/w postoperative changes - BIRADS: 2     Assessment   Assessment and Plan:   Alton Bach is a 44 y.o. female no significant PMH. 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 She presents today for a new mass of her left flap reconstruction.     # History of Left Breast ER+ DCIS  # Bilateral Breast Masses   2023: bilateral breast ultrasound: simple cysts bilaterally with area of scarring  2024 and 10/11/2023: dx bilateral mammogram: post surgical findings, multiple oil cysts in areas of palpable abnormalities, areas " of focal fat necrosis/architectural change c/w postoperative changes - BIRADS: 2     - see physical exam above, findings overall not clinically suspicious  - however, in light of new left sided breast mass, will obtain updated diagnostic mammogram and ultrasound for further characterization to confirm oil cysts/compare to prior    # Surveillance  - Clinical chest wall exam q 3 to 6 months x 5 y from surgery (then annually); continue monthly self breast exams and call with changes     # Left sided abd pain  - Nonspecific, MRI abd unremarkable  - refer to GI    # Leukocytosis, resolved   - Previously followed by hematology, Dr. Mendoza - WBC: 20 in 2021, neutrophil predominant (ANC: 15,000), bcr-abl negative - likely reactive/due to smoking  - resolved after smoking cessation    # Liver hemangioma  # Splenic cyst   - MRI abd: 2/1/2025 - 2.1 cm hemangioma in segment 3; scattered hepatic cysts, 1.1 cm splenic cyst  - Per hepatology     # Other Co-Morbidities:  - Other Cancer Screening: Pap: 2/2024 (ASCUS, HPV negative),  Colonoscopy: overdue (had issues w/ prep), skin cancer screening     Follow Up:  - Referrals: GI (abd pain and discuss options for colonoscopy prep), Dermatology (skin cancer screening check)  - Imaging: bilateral dx mmg and ultrasound  - RTC pending results of imaging       Med Onc Chart Routing  Urgent    Follow up with physician . RTC pending imaging results   Follow up with [facility]    Infusion scheduling note    Injection scheduling note    Labs    Imaging   Bilateral diagnostic mammogram and ultrasound ASAP   Pharmacy appointment    Other referrals           GI        MDM includes:    - Acute or chronic illness or injury that poses a threat to life or bodily function  - Consideration and discussion of significant complications based on comorbidities  - Review of prior external notes from unique source and review of diagnostic tests and information  - Independent review and explanation of 3+ results  from unique tests   - Discussion of management and ordering 3+ unique tests   - Extensive discussion of treatment and management including consideration of possible diagnoses and management options    - Overall, I discussed the diagnosis, history, stage, labs/imaging, prognosis, management, and treatment plan as applicable. I reviewed adverse short and long term effects as applicable.   - Informed patient if symptoms are getting worse that it is their responsibility to call the clinic and schedule follow up sooner than stated follow up. Also informed patient if they do not hear from the appointment center in 2-5 business days for their referrals, the patient must call the Oncology clinic so we can follow up on procedures or referral scheduling. Patient was fully informed of current medical plan, all questions were answered to their satisfaction and patient verbalized understanding. No further questions.   - Face to Face Visit time I spent with the patient: 75 minutes of total time spent on the encounter, including evaluating and counseling patient and/or coordinating care, which includes face to face time and non-face to face time preparing to see the patient (eg, review of tests and available records), Obtaining and/or reviewing separately obtained history, Documenting clinical information in the electronic or other health record, Independently interpreting results (not separately reported) and communicating results to the patient/family/caregiver, or Care coordination (not separately reported).     Signed   Nkechi Ellis MD  [facility]

## 2025-05-15 ENCOUNTER — HOSPITAL ENCOUNTER (OUTPATIENT)
Dept: RADIOLOGY | Facility: HOSPITAL | Age: 45
Discharge: HOME | End: 2025-05-15
Attending: FAMILY MEDICINE
Payer: COMMERCIAL

## 2025-05-15 ENCOUNTER — HOSPITAL ENCOUNTER (OUTPATIENT)
Dept: RADIOLOGY | Facility: HOSPITAL | Age: 45
Discharge: HOME | End: 2025-05-15
Attending: INTERNAL MEDICINE
Payer: COMMERCIAL

## 2025-05-15 DIAGNOSIS — Z86.000 HISTORY OF DUCTAL CARCINOMA IN SITU (DCIS) OF BREAST: ICD-10-CM

## 2025-05-15 DIAGNOSIS — Z12.31 ENCOUNTER FOR SCREENING MAMMOGRAM FOR BREAST CANCER: ICD-10-CM

## 2025-05-15 DIAGNOSIS — N63.0 MASS OF BREAST, UNSPECIFIED LATERALITY: ICD-10-CM

## 2025-05-15 PROCEDURE — 77062 BREAST TOMOSYNTHESIS BI: CPT | Mod: TC

## 2025-05-18 ENCOUNTER — RESULTS FOLLOW-UP (OUTPATIENT)
Dept: HEMATOLOGY/ONCOLOGY | Facility: CLINIC | Age: 45
End: 2025-05-18

## 2025-05-19 ENCOUNTER — TELEPHONE (OUTPATIENT)
Dept: HEMATOLOGY/ONCOLOGY | Facility: CLINIC | Age: 45
End: 2025-05-19
Payer: COMMERCIAL

## 2025-05-20 ENCOUNTER — OFFICE VISIT (OUTPATIENT)
Dept: HEMATOLOGY/ONCOLOGY | Facility: CLINIC | Age: 45
End: 2025-05-20
Payer: COMMERCIAL

## 2025-05-20 DIAGNOSIS — N89.8 VAGINAL DRYNESS: ICD-10-CM

## 2025-05-20 DIAGNOSIS — D05.12 DUCTAL CARCINOMA IN SITU (DCIS) OF LEFT BREAST: Primary | ICD-10-CM

## 2025-05-20 NOTE — PROGRESS NOTES
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 female with no significant PMH. She is followed by Medical Oncology for her history of Left Breast ER+ DCIS s/p bilateral mastectomies 10/2020, followed by bilateral MARY flap reconstruction 2023. She presents today to discuss the safety of vaginal estrogen in setting of her DCIS history.     HPI:  2020: screening mammogram: left breast mass at 12 OC  2020: Dx MMG: right breast - architectural distortion at 8 OC (CSL); 2.6 cm left breast mass at 3 OC (bx DCIS - ER: 90%, AR: 60%), left breast mass at 12 OC (benign)  10/12/2020: Bilateral total mastectomy and bilateral SNLBx with implant reconstruction; c/b necrosis (patient wore tight coband and ace bandage wrap around her chest postoperatively) requiring debridement, impalnt removal and exchange to tissue expander  PATHOLOGY: left - 2.1 cm focus of DCIS, 0 LNs = [id number], intermediate grade 2, negative margins  PATHOLOGY: right - benign with complex sclerosing lesion   2022: capsulotomy and capsulorraphy  2023:bilateral MARY flap reconstruction by Dr. Shine  10/2023: saw breast surgery for bilateral breast masses of her bilateral flap reconstruction likely 2/2 oil cysts/scar tissue - follows with Susana Segundo      Breast Imagin2023: bilateral breast ultrasound: simple cysts bilaterally with area of scarring  2024 and 10/11/2023: dx bilateral mammogram: post surgical findings, multiple oil cysts in areas of palpable abnormalities, areas of focal fat necrosis/architectural change c/w postoperative changes - BIRADS: 2     Prior  imagin2024: abdomen ultrasound performed for abdominal swelling::3.2 cm complex cystic lesion in the spleen, hepatic right renal cysts   2025: MRI abd w/ and w/o contrast: 1.1 cm splenic cyst, left 2.1 cm hepatic lobe hemangioma     Interval History:    No change in prior symptoms. She is overall doing well but does admit to:     (1) Left breast mass - since her flap reconstruction in , she has developed subcm masses bilaterally attributed to oil cysts and scar tissue. She previously followed with breast surgery, Rylee Segundo, prior imaging performed including breast ultrasound 2023 showing simple cysts bilaterally; dx mammogram (2024) w/ multiple oil cysts and other postoperative changes, no malignancy   - first started with a pea sized nodule in inner lower quadrant of right breast (persistent unchanged since her reconstruction). More recently she has developed a dime sized lesion in lower outer quadrant of the left breast. There have been other scattered nodules/masses that have spontaneously resolved  - the left breast mass is sometimes tender but not overtly painful   - no enlargement in noted masses    (2) Left sided abdominal pain - started recently, intermittent ache that self resolves, no radiation, no pattern. No other GI sx - denies N/V, GERD, epigastric pain, changes in bowel movements, GI bleeding, etc.     (3) Occasional pain with intercourse - 2 days following her period, she has mild pain with intercourse; otherwise denies significant genitourinary symptoms of menopause - no dryness, burning, irritation, recurrent UTIs     Patient otherwise denies fevers, chills, night sweats, headaches, chest pain, SOB, cough, N/V, diarrhea, constipation, weight loss, rashes.     Review of Systems:  14-point review of systems was asked with the pertinent positives and/or negatives stated in HPI.     Past Medical History:   Past Medical History:   Diagnosis Date    Anticoagulant long-term use      DCIS (ductal carcinoma in situ)      Past Surgical History:   Past Surgical History:   Procedure Laterality Date    breast augmentation      BREAST BIOPSY Right     BREAST CAPSULOTOMY Left 07/05/2022    Procedure: CAPSULOTOMY, BREAST;  Surgeon: Haresh Porter MD;  Location: [facility] OR [address] [place] [address];  Service: Plastics;  Laterality: Left;    BREAST RECONSTRUCTION Bilateral     Flap reconstruction 5/2022    CHOLECYSTECTOMY      MASTECTOMY Bilateral 10/12/2020    Procedure: MASTECTOMY-Bilateral ;  Surgeon: Anahi Huitron MD;  Location: [facility] OR [address] [place] [address];  Service: General;  Laterality: Bilateral;    REPLACEMENT OF IMPLANT OF BREAST Bilateral 10/12/2020    Procedure: REPLACEMENT, IMPLANT, BREAST-Bilateral;  Surgeon: Haresh Porter MD;  Location: [facility] OR [address] [place] [address];  Service: Plastics;  Laterality: Bilateral;    REPLACEMENT OF IMPLANT OF BREAST Left 11/04/2020    Procedure: Left Breast Implant Exchange;  Surgeon: Haresh Porter MD;  Location: [facility] OR [address] [place] [address];  Service: Plastics;  Laterality: Left;    REVISION OF BREAST IMPLANT Left 07/05/2022    Procedure: REVISION OF PROCEDURE INVOLVING BREAST IMPLANT;  Surgeon: Haresh Porter MD;  Location: [facility] OR [address] [place] [address];  Service: Plastics;  Laterality: Left;  lewis breast    SENTINEL LYMPH NODE BIOPSY Bilateral 10/12/2020    Procedure: BIOPSY, LYMPH NODE, SENTINEL-Bilateral;  Surgeon: Anahi Huitron MD;  Location: [facility] OR [address] [place] [address];  Service: General;  Laterality: Bilateral;       Breast Cancer/Gynecology History:    Age of menarche: 10  Age of menopause: NA  Number of Pregnancies: 3 Number of Live Births: 1    Family History:   - No family hx of breast cancer    Social History:   Lives: [place], family is here, has one daughter (26)  Occupation: [employer]   Tobacco use: former, started at age [age], about 4 cigarettes/day   Alcohol use: denies   Illicit drug use: denies      Allergies:  Review of patient's allergies indicates:   Allergen Reactions     Ciprofloxacin Hives, Shortness Of Breath and Itching    Cleocin [clindamycin hcl] Hives and Swelling    Coconut Swelling and Rash    Dye Swelling and Blisters     Hair Dye    Strawberry Swelling and Rash    Bacitracin Rash       Medications:  No current outpatient medications       Objective:   Vitals:   There were no vitals filed for this visit.    BMI: There is no height or weight on file to calculate BMI.  ECOG Performance Status: 1    Physical Exam     General Appearance:    Alert, cooperative, no distress   VIRTUAL VISIT     Laboratory Data:  No visits with results within 1 Week(s) from this visit.   Latest known visit with results is:   Lab Visit on 01/15/2025   Component Date Value Ref Range Status    WBC 01/15/2025 8.80  3.90 - 12.70 K/uL Final    RBC 01/15/2025 4.50  4.00 - 5.40 M/uL Final    Hemoglobin 01/15/2025 13.4  12.0 - 16.0 g/dL Final    Hematocrit 01/15/2025 42.7  37.0 - 48.5 % Final    MCV 01/15/2025 95  82 - 98 fL Final    MCH 01/15/2025 29.8  27.0 - 31.0 pg Final    MCHC 01/15/2025 31.4 (L)  32.0 - 36.0 g/dL Final    RDW 01/15/2025 13.2  11.5 - 14.5 % Final    Platelets 01/15/2025 327  150 - 450 K/uL Final    MPV 01/15/2025 11.3  9.2 - 12.9 fL Final    Immature Granulocytes 01/15/2025 0.3  0.0 - 0.5 % Final    Gran # (ANC) 01/15/2025 4.6  1.8 - 7.7 K/uL Final    Immature Grans (Abs) 01/15/2025 0.03  0.00 - 0.04 K/uL Final    Comment: Mild elevation in immature granulocytes is non specific and   can be seen in a variety of conditions including stress response,   acute inflammation, trauma and pregnancy. Correlation with other   laboratory and clinical findings is essential.      Lymph # 01/15/2025 3.4  1.0 - 4.8 K/uL Final    Mono # 01/15/2025 0.7  0.3 - 1.0 K/uL Final    Eos # 01/15/2025 0.1  0.0 - 0.5 K/uL Final    Baso # 01/15/2025 0.04  0.00 - 0.20 K/uL Final    nRBC 01/15/2025 0  0 /100 WBC Final    Gran % 01/15/2025 51.7  38.0 - 73.0 % Final    Lymph % 01/15/2025 38.1  18.0 - 48.0 % Final     Mono % 01/15/2025 7.8  4.0 - 15.0 % Final    Eosinophil % 01/15/2025 1.6  0.0 - 8.0 % Final    Basophil % 01/15/2025 0.5  0.0 - 1.9 % Final    Differential Method 01/15/2025 Automated   Final    Sodium 01/15/2025 138  136 - 145 mmol/L Final    Potassium 01/15/2025 4.1  3.5 - 5.1 mmol/L Final    Chloride 01/15/2025 107  95 - 110 mmol/L Final    CO2 01/15/2025 26  23 - 29 mmol/L Final    Glucose 01/15/2025 95  70 - 110 mg/dL Final    BUN 01/15/2025 7  6 - 20 mg/dL Final    Creatinine 01/15/2025 0.6  0.5 - 1.4 mg/dL Final    Calcium 01/15/2025 9.2  8.7 - 10.5 mg/dL Final    Total Protein 01/15/2025 6.9  6.0 - 8.4 g/dL Final    Albumin 01/15/2025 3.9  3.5 - 5.2 g/dL Final    Total Bilirubin 01/15/2025 0.4  0.1 - 1.0 mg/dL Final    Comment: For infants and newborns, interpretation of results should be based  on gestational age, weight and in agreement with clinical  observations.    Premature Infant recommended reference ranges:  Up to 24 hours.............<8.0 mg/dL  Up to 48 hours............<12.0 mg/dL  3-5 days..................<15.0 mg/dL  6-29 days.................<15.0 mg/dL      Alkaline Phosphatase 01/15/2025 60  40 - 150 U/L Final    AST 01/15/2025 12  10 - 40 U/L Final    ALT 01/15/2025 17  10 - 44 U/L Final    eGFR 01/15/2025 >60.0  >60 mL/min/1.73 m^2 Final    Anion Gap 01/15/2025 5 (L)  8 - 16 mmol/L Final    Hemoglobin A1C 01/15/2025 5.4  4.0 - 5.6 % Final    Comment: ADA Screening Guidelines:  5.7-6.4%  Consistent with prediabetes  >or=6.5%  Consistent with diabetes    High levels of fetal hemoglobin interfere with the HbA1C  assay. Heterozygous hemoglobin variants (HbS, HgC, etc)do  not significantly interfere with this assay.   However, presence of multiple variants may affect accuracy.      Estimated Avg Glucose 01/15/2025 108  68 - 131 mg/dL Final    Cholesterol 01/15/2025 156  120 - 199 mg/dL Final    Comment: The National Cholesterol Education Program (NCEP) has set the  following guidelines  "(reference ranges) for Cholesterol:  Optimal.....................<200 mg/dL  Borderline High.............200-239 mg/dL  High........................> or = 240 mg/dL      Triglycerides 01/15/2025 153 (H)  30 - 150 mg/dL Final    Comment: The National Cholesterol Education Program (NCEP) has set the  following guidelines (reference values) for triglycerides:  Normal......................<150 mg/dL  Borderline High.............150-199 mg/dL  High........................200-499 mg/dL      HDL 01/15/2025 32 (L)  40 - 75 mg/dL Final    Comment: The National Cholesterol Education Program (NCEP) has set the  following guidelines (reference values) for HDL Cholesterol:  Low...............<40 mg/dL  Optimal...........>60 mg/dL      LDL Cholesterol 01/15/2025 93.4  63.0 - 159.0 mg/dL Final    Comment: The National Cholesterol Education Program (NCEP) has set the  following guidelines (reference values) for LDL Cholesterol:  Optimal.......................<130 mg/dL  Borderline High...............130-159 mg/dL  High..........................160-189 mg/dL  Very High.....................>190 mg/dL      HDL/Cholesterol Ratio 01/15/2025 20.5  20.0 - 50.0 % Final    Total Cholesterol/HDL Ratio 01/15/2025 4.9  2.0 - 5.0 Final    Non-HDL Cholesterol 01/15/2025 124  mg/dL Final    Comment: Risk category and Non-HDL cholesterol goals:  Coronary heart disease (CHD)or equivalent (10-year risk of CHD >20%):  Non-HDL cholesterol goal     <130 mg/dL  Two or more CHD risk factors and 10-year risk of CHD <= 20%:  Non-HDL cholesterol goal     <160 mg/dL  0 to 1 CHD risk factor:  Non-HDL cholesterol goal     <190 mg/dL       No results for input(s): "WBC", "HGB", "HCT", "PLT" in the last 2160 hours.  No results for input(s): "CREATININE", "AST", "ALT" in the last 2160 hours.    Invalid input(s): "GFR", "BILIRUBIN TOTAL", "ALKALINE PHOSPHATASE"  No results for input(s): "IRON", "FERRITIN" in the last 2160 hours.     Imagin2023: bilateral " breast ultrasound: simple cysts bilaterally with area of scarring    6/18/2024 and 10/11/2023: dx bilateral mammogram: post surgical findings, multiple oil cysts in areas of palpable abnormalities, areas of focal fat necrosis/architectural change c/w postoperative changes - BIRADS: 2     5/15/2025: Dx MMG: multiple benign bilateral oil cysts in palpable areas of concer - no suspicious masses     Assessment   Assessment and Plan:   Alton Bach is a 44 y.o. female no significant PMH. 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 She is without evidence of disease. She presents today to discuss the safety of vaginal estrogen in setting of her ER+ DCIS history.     # History of Left Breast ER+ DCIS  # Bilateral Breast Masses   9/20/2023: bilateral breast ultrasound: simple cysts bilaterally with area of scarring  6/18/2024 and 10/11/2023: dx bilateral mammogram: post surgical findings, multiple oil cysts in areas of palpable abnormalities, areas of focal fat necrosis/architectural change c/w postoperative changes - BIRADS: 2     - see physical exam above, findings overall not clinically suspicious   - however, in light of new left sided breast mass, obtained updated diagnostic mammogram and ultrasound for further characterization   - 5/15/2025: Dx MMG: multiple benign bilateral oil cysts in palpable areas of concern - no suspicious masses; ultrasound not performed as requested   - Return precautions reviewed; will see next year for CBE    # Surveillance  - Clinical chest wall exam q 3 to 6 months x 5 y from surgery (then annually); continue monthly self breast exams and call with changes     # Vaginal dryness   - Symptoms are minimal and not bothersome, so would try estrogen -free vaginal moisturizer   - Reviewed the American Urologic Association 2025 guidelines regarding vaginal estrogen  and breast cancer (in those with GSM who have a personal history of breast cancer, clinicians may recommend local low dose vaginal estrogen in the context of multi-disciplinary shared decision-making)    # Left sided abd pain  - Nonspecific, MRI abd unremarkable  - refer to GI    # Leukocytosis, resolved   - Previously followed by hematology, Dr. Mendoza - WBC: 20 in 2021, neutrophil predominant (ANC: 15,000), bcr-abl negative - likely reactive/due to smoking  - resolved after smoking cessation    # Liver hemangioma  # Splenic cyst   - MRI abd: 2/1/2025 - 2.1 cm hemangioma in segment 3; scattered hepatic cysts, 1.1 cm splenic cyst  - Per hepatology     # Other Co-Morbidities:  - Other Cancer Screening: Pap: 2/2024 (ASCUS, HPV negative),  Colonoscopy: overdue (had issues w/ prep), skin cancer screening     Follow Up:  - Referrals: GI (abd pain and discuss options for colonoscopy prep), Dermatology (skin cancer screening check)  - RTC one year for clinical breast exam or sooner PRN      Med Onc Chart Routing      Follow up with physician . RTC in one year, May 2026, with [facility] for clinical breast exam - recall list   Follow up with [facility]    Infusion scheduling note    Injection scheduling note    Labs    Imaging    Pharmacy appointment    Other referrals               MDM includes:    - Acute or chronic illness or injury that poses a threat to life or bodily function  - Consideration and discussion of significant complications based on comorbidities  - Review of prior external notes from unique source and review of diagnostic tests and information  - Independent review and explanation of 3+ results from unique tests   - Discussion of management and ordering 3+ unique tests   - Extensive discussion of treatment and management including consideration of possible diagnoses and management options    - Overall, I discussed the diagnosis, history, stage, labs/imaging, prognosis, management, and treatment plan as applicable. I  reviewed adverse short and long term effects as applicable.   - Informed patient if symptoms are getting worse that it is their responsibility to call the clinic and schedule follow up sooner than stated follow up. Also informed patient if they do not hear from the appointment center in 2-5 business days for their referrals, the patient must call the Oncology clinic so we can follow up on procedures or referral scheduling. Patient was fully informed of current medical plan, all questions were answered to their satisfaction and patient verbalized understanding. No further questions.   - Face to Face Visit time I spent with the patient: 30  minutes of total time spent on the encounter, including evaluating and counseling patient and/or coordinating care, which includes face to face time and non-face to face time preparing to see the patient (eg, review of tests and available records), Obtaining and/or reviewing separately obtained history, Documenting clinical information in the electronic or other health record, Independently interpreting results (not separately reported) and communicating results to the patient/family/caregiver, or Care coordination (not separately reported).   - Telephone/Video Visit:  This is a telephone/video visit that took place. The patient location is: home. The patient is understanding of these limitations and that there is an increased risk of incomplete diagnosis and potential for incomplete or inaccurate discussion of treatment options. With that understanding , they are instructed that if their problem isn't resolving they will schedule an in person visit. Each patient to whom he or she provides medical services by telemedicine is:  (1) informed of the relationship between the physician and patient and the respective role of any other health care provider with respect to management of the patient; and (2) notified that he or she may decline to receive medical services by telemedicine and  may withdraw from such care at any time.     Signed   Nkechi Ellis MD  [facility]

## 2025-06-18 ENCOUNTER — PATIENT MESSAGE (OUTPATIENT)
Dept: DERMATOLOGY | Facility: CLINIC | Age: 45
End: 2025-06-18
Payer: COMMERCIAL

## 2025-06-30 ENCOUNTER — ON-DEMAND VIRTUAL (OUTPATIENT)
Dept: URGENT CARE | Facility: CLINIC | Age: 45
End: 2025-06-30
Payer: COMMERCIAL

## 2025-06-30 DIAGNOSIS — L03.213 PERIORBITAL CELLULITIS OF RIGHT EYE: ICD-10-CM

## 2025-06-30 DIAGNOSIS — H00.012 HORDEOLUM EXTERNUM OF RIGHT LOWER EYELID: Primary | ICD-10-CM

## 2025-06-30 PROCEDURE — 98005 SYNCH AUDIO-VIDEO EST LOW 20: CPT | Mod: 95,,, | Performed by: NURSE PRACTITIONER

## 2025-06-30 RX ORDER — ERYTHROMYCIN 5 MG/G
OINTMENT OPHTHALMIC EVERY 6 HOURS
Qty: 1 G | Refills: 0 | Status: SHIPPED | OUTPATIENT
Start: 2025-06-30 | End: 2025-07-05

## 2025-06-30 RX ORDER — CEPHALEXIN 500 MG/1
500 CAPSULE ORAL EVERY 6 HOURS
Qty: 20 CAPSULE | Refills: 0 | Status: SHIPPED | OUTPATIENT
Start: 2025-06-30 | End: 2025-07-05

## 2025-07-01 NOTE — PROGRESS NOTES
Subjective:      Patient ID: Alton Bach is a 44 y.o. female.    Vitals:  vitals were not taken for this visit.     Chief Complaint: No chief complaint on file.      Visit Type: TELE AUDIOVISUAL - This visit was conducted virtually based on  subjective information and limited objective exam    Present with the patient at the time of consultation: TELEMED PRESENT WITH PATIENT: None  LOCATION OF PATIENT [facility] patient identifiers used to verify patient- saying out date of birth and full name.       Past Medical History:   Diagnosis Date    Anticoagulant long-term use     DCIS (ductal carcinoma in situ)      Past Surgical History:   Procedure Laterality Date    breast augmentation      BREAST BIOPSY Right     BREAST CAPSULOTOMY Left 07/05/2022    Procedure: CAPSULOTOMY, BREAST;  Surgeon: Haresh Porter MD;  Location: [facility] OR [address] [place] [address];  Service: Plastics;  Laterality: Left;    BREAST RECONSTRUCTION Bilateral     Flap reconstruction 5/2022    CHOLECYSTECTOMY      MASTECTOMY Bilateral 10/12/2020    Procedure: MASTECTOMY-Bilateral ;  Surgeon: Anahi Huitron MD;  Location: [facility] OR [address] [place] [address];  Service: General;  Laterality: Bilateral;    REPLACEMENT OF IMPLANT OF BREAST Bilateral 10/12/2020    Procedure: REPLACEMENT, IMPLANT, BREAST-Bilateral;  Surgeon: Haresh Porter MD;  Location: [facility] OR [address] [place] [address];  Service: Plastics;  Laterality: Bilateral;    REPLACEMENT OF IMPLANT OF BREAST Left 11/04/2020    Procedure: Left Breast Implant Exchange;  Surgeon: Haresh Porter MD;  Location: [facility] OR [address] [place] [address];  Service: Plastics;  Laterality: Left;    REVISION OF BREAST IMPLANT Left 07/05/2022    Procedure: REVISION OF PROCEDURE INVOLVING BREAST IMPLANT;  Surgeon: Haresh Porter MD;  Location: [address];  Service: Plastics;  Laterality: Left;  lewis breast    SENTINEL LYMPH NODE BIOPSY Bilateral 10/12/2020    Procedure: BIOPSY, LYMPH NODE, SENTINEL-Bilateral;  Surgeon: Anahi Huitron MD;  " Location: [facility] OR [address] [place] [address];  Service: General;  Laterality: Bilateral;     Review of patient's allergies indicates:   Allergen Reactions    Ciprofloxacin Hives, Shortness Of Breath and Itching    Cleocin [clindamycin hcl] Hives and Swelling    Coconut Swelling and Rash    Dye Swelling and Blisters     Hair Dye    Strawberry Swelling and Rash    Bacitracin Rash     Medications Ordered Prior to Encounter[1]  Family History   Problem Relation Name Age of Onset    Hypertension Mother      Heart disease Father         Medications Ordered                [facility] [id number] [place] [address] EXP AT [address] [address]    Telephone: [phone]   Fax: [phone]   Hours: Not open 24 hours                         E-Prescribed (2 of 2)              cephALEXin (KEFLEX) 500 MG capsule    Sig: Take 1 capsule (500 mg total) by mouth every 6 (six) hours. for 5 days       Start: 6/30/25     Quantity: 20 capsule Refills: 0                         erythromycin (ROMYCIN) ophthalmic ointment    Sig: Place into the left eye every 6 (six) hours. for 5 days       Start: 6/30/25     Quantity: 1 g Refills: 0                           Ohs Peq Odvv Intake    6/30/2025  7:47 PM CDT - Filed by Patient   What is your current physical address in the event of a medical emergency? [address]   Are you able to take your vital signs? No   Please attach any relevant images or files    Is your employer contracted with [facility]? No         R lower eyelid erythema, edema and soreness x 2 days. Patient reports redness seems to be spreading under right eye as well with mild swelling. She states "I feel a small lump." Onset after exposure to a cat.       ROS     Objective:   The physical exam was conducted virtually.    AAO x 3 ; no acute distress noted; appearance normal; mood and behavior normal; thought process normal  Head- normocephalic  Nose- appears normal, " no discharge or erythema  Eyes- pupils appear normal in size, no drainage, no erythema  Ears- normal appearing; no discharge, no erythema  Mouth- appears normal  Oropharynx- no erythema, lesions  Lungs- breathing at a normal rate, no acute distress noted  Heart- no reports of tachycardia, palpitations, chest pain  Abdomen- non distended, non tender reported by patient  Skin- warm and dry, no erythema or edema noted by patient or visualized        Assessment:     1. Hordeolum externum of right lower eyelid    2. Periorbital cellulitis of right eye        Plan:   Patient informed symptoms maybe related to allergy, irritant associated with exposure to cat. She reports symptoms improved with antibiotics in the past  Monitor right eye closely for increased redness, swelling and warmth. If symptoms worsen recommend in person evaluation.     Thank you for choosing [facility]!    Our goal in the [facility] is to always provide outstanding medical care. You may receive a survey by mail or e-mail in the next week regarding your experience today. We would greatly appreciate you completing and returning the survey. Your feedback provides us with a way to recognize our staff who provide very good care, and it helps us learn how to improve when your experience was below our aspiration of excellence.         We appreciate you trusting us with your medical care. We hope you feel better soon. We will be happy to take care of you for all of your future medical needs.    You must understand that you've received an Urgent Care treatment only and that you may be released before all your medical problems are known or treated. You, the patient, will arrange for follow up care as instructed.    Follow up with your PCP or specialty clinic as directed in the next 1-2 weeks if not improved or as needed.  You can call [phone] to schedule an appointment with the appropriate provider.    If your  condition worsens we recommend that you receive another evaluation in person, with your primary care provider, urgent care or at the emergency room immediately or contact your primary medical clinics after hours call service to discuss your concerns.         Hordeolum externum of right lower eyelid  -     erythromycin (ROMYCIN) ophthalmic ointment; Place into the left eye every 6 (six) hours. for 5 days  Dispense: 1 g; Refill: 0    Periorbital cellulitis of right eye  -     cephALEXin (KEFLEX) 500 MG capsule; Take 1 capsule (500 mg total) by mouth every 6 (six) hours. for 5 days  Dispense: 20 capsule; Refill: 0                          [1]   No current outpatient medications on file prior to visit.     Current Facility-Administered Medications on File Prior to Visit   Medication Dose Route Frequency Provider Last Rate Last Admin    heparin (porcine) injection 5,000 Units  5,000 Units Subcutaneous Once Karen Garcia MD        sodium chloride 0.9% flush 10 mL  10 mL Intravenous PRN Karen Garcia MD

## (undated) DEVICE — MANIFOLD 4 PORT

## (undated) DEVICE — STAPLER SKIN ROTATING HEAD

## (undated) DEVICE — SYR 50CC LL

## (undated) DEVICE — SUT MONOCRYL 3-0 PS-2 UND

## (undated) DEVICE — BRA POST SURGICAL WHT 36-38IN

## (undated) DEVICE — BOVIE SUCTION

## (undated) DEVICE — PACK UNIVERSAL SPLIT II

## (undated) DEVICE — NEOGUARD COVER 4X30CM STERILE

## (undated) DEVICE — NDL HYPO REG 25G X 1 1/2

## (undated) DEVICE — DRAIN CHANNEL ROUND 15FR

## (undated) DEVICE — SKINMARKER & RULER REGULAR X-F

## (undated) DEVICE — ELECTRODE BLADE INSULATED 1 IN

## (undated) DEVICE — CONTAINER SPECIMEN STRL 4OZ

## (undated) DEVICE — SPONGE DERMACEA GAUZE 4X4

## (undated) DEVICE — SYR DISP LL 5CC

## (undated) DEVICE — TUBING 8FT HI VACLIPOSUCTION

## (undated) DEVICE — SUT 2-0 VICRYL / CT-1

## (undated) DEVICE — BLADE SURG #15 CARBON STEEL

## (undated) DEVICE — EVACUATOR WOUND BULB 100CC

## (undated) DEVICE — STAPLER SKIN COUNT 35 W STPL

## (undated) DEVICE — SOL NS 1000CC

## (undated) DEVICE — SEE MEDLINE ITEM 157128

## (undated) DEVICE — SUT SILK SH 2-0 30IN MP BLK

## (undated) DEVICE — GOWN AERO CHROME W/ TOWEL XL

## (undated) DEVICE — Device

## (undated) DEVICE — SEE MEDLINE ITEM 152512

## (undated) DEVICE — ELECTRODE REM PLYHSV RETURN 9

## (undated) DEVICE — SPONGE LAP 18X18 PREWASHED

## (undated) DEVICE — SYS CLSR DERMABOND PRINEO 22CM

## (undated) DEVICE — BLADE SURG CARBON STEEL SZ11

## (undated) DEVICE — DRESSING XEROFORM FOIL PK 1X8

## (undated) DEVICE — SYS PRINEO SKIN CLOSURE

## (undated) DEVICE — TRAY MINOR GEN SURG

## (undated) DEVICE — SEE MEDLINE ITEM 146417

## (undated) DEVICE — SUT PROLENE 2-0 36IN MH BLU

## (undated) DEVICE — CONTAINER SPECIMEN 4OZ

## (undated) DEVICE — SEE MEDLINE ITEM 152487

## (undated) DEVICE — WAVEGUIDE BRITEFIELD DISP

## (undated) DEVICE — NDL 18GA X1 1/2 REG BEVEL

## (undated) DEVICE — SUT VICRYL 3-0 27 SH

## (undated) DEVICE — SUT MCRYL PLUS 4-0 PS2 27IN

## (undated) DEVICE — ADHESIVE DERMABOND ADVANCED

## (undated) DEVICE — HOLDER TUBE

## (undated) DEVICE — FUNNEL KELLER STERILE

## (undated) DEVICE — DRAPE STERI INSTRUMENT 1018

## (undated) DEVICE — SCALPEL #10 BLADE STRL DISP

## (undated) DEVICE — TRAY FOLEY 16FR INFECTION CONT

## (undated) DEVICE — GAUZE FLUFF XXLG 36X36 2 PLY

## (undated) DEVICE — CUP MEDICINE STERILE 2OZ

## (undated) DEVICE — SYR CATHETER TIP 60ML

## (undated) DEVICE — SEE MEDLINE ITEM 157117

## (undated) DEVICE — BRA SURGICAL MED 36-38

## (undated) DEVICE — TOWEL OR DISP STRL BLUE 4/PK

## (undated) DEVICE — BLADE 4IN EDGE INSULATED

## (undated) DEVICE — SEE MEDLINE ITEM 152622

## (undated) DEVICE — COMPRESSION BRA STYLE 7 36

## (undated) DEVICE — BLADE #15 STERILE CARBON

## (undated) DEVICE — PAD ABDOMINAL 5X9 STERILE

## (undated) DEVICE — SUT SILK 2-0 PS 18IN BLACK

## (undated) DEVICE — BRA STYLE 7 CUP EXTRA LARGE

## (undated) DEVICE — SYR 10CC LUER LOCK

## (undated) DEVICE — SIZER BREAST NATRELLE 445ML
Type: IMPLANTABLE DEVICE | Site: BREAST | Status: NON-FUNCTIONAL
Removed: 2020-10-12

## (undated) DEVICE — SUT ETHILON 2-0 PSLX 30IN

## (undated) DEVICE — GOWN SURGICAL X-LARGE

## (undated) DEVICE — SEE MEDLINE ITEM 152572

## (undated) DEVICE — TUBING INFILTRATION STRL DISP